# Patient Record
Sex: FEMALE | Race: OTHER | HISPANIC OR LATINO | URBAN - METROPOLITAN AREA
[De-identification: names, ages, dates, MRNs, and addresses within clinical notes are randomized per-mention and may not be internally consistent; named-entity substitution may affect disease eponyms.]

---

## 2017-12-19 ENCOUNTER — EMERGENCY (EMERGENCY)
Facility: HOSPITAL | Age: 62
LOS: 0 days | Discharge: HOME | End: 2017-12-19

## 2017-12-19 DIAGNOSIS — N39.0 URINARY TRACT INFECTION, SITE NOT SPECIFIED: ICD-10-CM

## 2017-12-19 DIAGNOSIS — I10 ESSENTIAL (PRIMARY) HYPERTENSION: ICD-10-CM

## 2017-12-19 DIAGNOSIS — E11.9 TYPE 2 DIABETES MELLITUS WITHOUT COMPLICATIONS: ICD-10-CM

## 2017-12-19 DIAGNOSIS — R11.2 NAUSEA WITH VOMITING, UNSPECIFIED: ICD-10-CM

## 2017-12-19 DIAGNOSIS — R31.9 HEMATURIA, UNSPECIFIED: ICD-10-CM

## 2017-12-19 DIAGNOSIS — Z88.1 ALLERGY STATUS TO OTHER ANTIBIOTIC AGENTS STATUS: ICD-10-CM

## 2017-12-19 DIAGNOSIS — R53.1 WEAKNESS: ICD-10-CM

## 2017-12-29 PROBLEM — Z00.00 ENCOUNTER FOR PREVENTIVE HEALTH EXAMINATION: Status: ACTIVE | Noted: 2017-12-29

## 2018-01-10 ENCOUNTER — OUTPATIENT (OUTPATIENT)
Dept: OUTPATIENT SERVICES | Facility: HOSPITAL | Age: 63
LOS: 1 days | Discharge: HOME | End: 2018-01-10

## 2018-01-10 ENCOUNTER — APPOINTMENT (OUTPATIENT)
Dept: INTERNAL MEDICINE | Facility: CLINIC | Age: 63
End: 2018-01-10

## 2018-01-10 VITALS
DIASTOLIC BLOOD PRESSURE: 86 MMHG | WEIGHT: 141 LBS | BODY MASS INDEX: 25.95 KG/M2 | HEART RATE: 76 BPM | HEIGHT: 62 IN | SYSTOLIC BLOOD PRESSURE: 127 MMHG

## 2018-01-10 DIAGNOSIS — C80.1 MALIGNANT (PRIMARY) NEOPLASM, UNSPECIFIED: ICD-10-CM

## 2018-01-10 DIAGNOSIS — E11.9 TYPE 2 DIABETES MELLITUS WITHOUT COMPLICATIONS: ICD-10-CM

## 2018-01-10 DIAGNOSIS — I10 ESSENTIAL (PRIMARY) HYPERTENSION: ICD-10-CM

## 2018-01-12 ENCOUNTER — RESULT REVIEW (OUTPATIENT)
Age: 63
End: 2018-01-12

## 2018-01-16 LAB
ALBUMIN SERPL-MCNC: 4.2 G/DL
ALBUMIN/GLOB SERPL: 1.75
ALP SERPL-CCNC: 83 IU/L
ALT SERPL-CCNC: 25 IU/L
ANION GAP SERPL CALC-SCNC: 7 MEQ/L
AST SERPL-CCNC: 26 IU/L
BASOPHILS # BLD: 0.02 TH/MM3
BASOPHILS NFR BLD: 0.3 %
BILIRUB SERPL-MCNC: 0.5 MG/DL
BUN SERPL-MCNC: 16 MG/DL
BUN/CREAT SERPL: 19 %
CALCIUM SERPL-MCNC: 9.5 MG/DL
CHLORIDE SERPL-SCNC: 111 MEQ/L
CHOLEST SERPL-MCNC: 106 MG/DL
CO2 SERPL-SCNC: 23 MEQ/L
CREAT SERPL-MCNC: 0.84 MG/DL
DIFFERENTIAL METHOD BLD: NORMAL
EOSINOPHIL # BLD: 0.21 TH/MM3
EOSINOPHIL NFR BLD: 2.9 %
ERYTHROCYTE [DISTWIDTH] IN BLOOD BY AUTOMATED COUNT: 13.5 %
ESTIMATED AVERGAGE GLUCOSE (NORTH): 134 MG/DL
GFR SERPL CREATININE-BSD FRML MDRD: 69
GLUCOSE SERPL-MCNC: 102 MG/DL
GRANULOCYTES # BLD: 3.72 TH/MM3
GRANULOCYTES NFR BLD: 51.9 %
HBA1C MFR BLD: 6.3 %
HCT VFR BLD AUTO: 41.6 %
HDLC SERPL-MCNC: 43 MG/DL
HDLC SERPL: 2.47
HGB BLD-MCNC: 13.3 G/DL
IMM GRANULOCYTES # BLD: 0.02 TH/MM3
IMM GRANULOCYTES NFR BLD: 0.3 %
LDLC SERPL DIRECT ASSAY-MCNC: 55 MG/DL
LYMPHOCYTES # BLD: 2.69 TH/MM3
LYMPHOCYTES NFR BLD: 37.5 %
MCH RBC QN AUTO: 28.4 PG
MCHC RBC AUTO-ENTMCNC: 32 G/DL
MCV RBC AUTO: 88.9 FL
MONOCYTES # BLD: 0.51 TH/MM3
MONOCYTES NFR BLD: 7.1 %
PLATELET # BLD: 207 TH/MM3
PMV BLD AUTO: 12.6 FL
POTASSIUM SERPL-SCNC: 4 MMOL/L
PROT SERPL-MCNC: 6.6 G/DL
RBC # BLD AUTO: 4.68 MIL/MM3
SODIUM SERPL-SCNC: 141 MEQ/L
TRIGL SERPL-MCNC: 68 MG/DL
TSH SERPL DL<=0.005 MIU/L-ACNC: 1.13 UIU/ML
VLDLC SERPL-MCNC: 13 MG/DL
WBC # BLD: 7.17 TH/MM3

## 2018-02-22 ENCOUNTER — OUTPATIENT (OUTPATIENT)
Dept: OUTPATIENT SERVICES | Facility: HOSPITAL | Age: 63
LOS: 1 days | Discharge: HOME | End: 2018-02-22

## 2018-02-23 DIAGNOSIS — F31.81 BIPOLAR II DISORDER: ICD-10-CM

## 2018-03-14 ENCOUNTER — OUTPATIENT (OUTPATIENT)
Dept: OUTPATIENT SERVICES | Facility: HOSPITAL | Age: 63
LOS: 1 days | Discharge: HOME | End: 2018-03-14

## 2018-03-14 ENCOUNTER — APPOINTMENT (OUTPATIENT)
Dept: OBGYN | Facility: CLINIC | Age: 63
End: 2018-03-14

## 2018-03-14 DIAGNOSIS — F31.81 BIPOLAR II DISORDER: ICD-10-CM

## 2018-03-23 ENCOUNTER — OUTPATIENT (OUTPATIENT)
Dept: OUTPATIENT SERVICES | Facility: HOSPITAL | Age: 63
LOS: 1 days | Discharge: HOME | End: 2018-03-23

## 2018-03-23 ENCOUNTER — APPOINTMENT (OUTPATIENT)
Dept: INTERNAL MEDICINE | Facility: CLINIC | Age: 63
End: 2018-03-23

## 2018-03-23 VITALS
WEIGHT: 137 LBS | HEIGHT: 62 IN | DIASTOLIC BLOOD PRESSURE: 81 MMHG | BODY MASS INDEX: 25.21 KG/M2 | HEART RATE: 76 BPM | SYSTOLIC BLOOD PRESSURE: 122 MMHG

## 2018-03-23 DIAGNOSIS — M54.2 CERVICALGIA: ICD-10-CM

## 2018-03-23 DIAGNOSIS — C50.912 MALIGNANT NEOPLASM OF UNSPECIFIED SITE OF LEFT FEMALE BREAST: ICD-10-CM

## 2018-03-23 RX ORDER — HYDROCORTISONE 1 %
12 CREAM (GRAM) TOPICAL
Qty: 225 | Refills: 0 | Status: DISCONTINUED | COMMUNITY
Start: 2017-05-03 | End: 2018-03-23

## 2018-03-23 RX ORDER — OMEPRAZOLE 20 MG/1
20 TABLET, DELAYED RELEASE ORAL
Qty: 30 | Refills: 0 | Status: COMPLETED | COMMUNITY
Start: 2017-03-29

## 2018-03-23 RX ORDER — METFORMIN ER 500 MG 500 MG/1
500 TABLET ORAL
Qty: 30 | Refills: 0 | Status: COMPLETED | COMMUNITY
Start: 2017-11-30

## 2018-03-23 RX ORDER — LIDOCAINE 5% 700 MG/1
5 PATCH TOPICAL
Refills: 0 | Status: DISCONTINUED | COMMUNITY
End: 2018-03-23

## 2018-03-23 RX ORDER — CIPROFLOXACIN HYDROCHLORIDE 500 MG/1
500 TABLET, FILM COATED ORAL
Qty: 20 | Refills: 0 | Status: COMPLETED | COMMUNITY
Start: 2017-12-19

## 2018-03-23 RX ORDER — ASPIRIN 81 MG
81 TABLET, DELAYED RELEASE (ENTERIC COATED) ORAL
Refills: 0 | Status: COMPLETED | COMMUNITY
End: 2018-03-23

## 2018-03-23 RX ORDER — ANASTROZOLE TABLETS 1 MG/1
1 TABLET ORAL
Refills: 0 | Status: COMPLETED | COMMUNITY
End: 2018-03-23

## 2018-03-26 DIAGNOSIS — E11.9 TYPE 2 DIABETES MELLITUS WITHOUT COMPLICATIONS: ICD-10-CM

## 2018-03-26 DIAGNOSIS — D05.12 INTRADUCTAL CARCINOMA IN SITU OF LEFT BREAST: ICD-10-CM

## 2018-03-26 DIAGNOSIS — E78.5 HYPERLIPIDEMIA, UNSPECIFIED: ICD-10-CM

## 2018-04-02 ENCOUNTER — OUTPATIENT (OUTPATIENT)
Dept: OUTPATIENT SERVICES | Facility: HOSPITAL | Age: 63
LOS: 1 days | Discharge: HOME | End: 2018-04-02

## 2018-04-02 DIAGNOSIS — Z12.31 ENCOUNTER FOR SCREENING MAMMOGRAM FOR MALIGNANT NEOPLASM OF BREAST: ICD-10-CM

## 2018-04-09 ENCOUNTER — APPOINTMENT (OUTPATIENT)
Dept: HEMATOLOGY ONCOLOGY | Facility: CLINIC | Age: 63
End: 2018-04-09

## 2018-04-09 ENCOUNTER — OUTPATIENT (OUTPATIENT)
Dept: OUTPATIENT SERVICES | Facility: HOSPITAL | Age: 63
LOS: 1 days | Discharge: HOME | End: 2018-04-09

## 2018-04-09 VITALS
TEMPERATURE: 96.1 F | WEIGHT: 142 LBS | HEART RATE: 69 BPM | SYSTOLIC BLOOD PRESSURE: 153 MMHG | BODY MASS INDEX: 26.13 KG/M2 | HEIGHT: 62 IN | DIASTOLIC BLOOD PRESSURE: 92 MMHG

## 2018-04-09 DIAGNOSIS — F17.200 NICOTINE DEPENDENCE, UNSPECIFIED, UNCOMPLICATED: ICD-10-CM

## 2018-04-09 DIAGNOSIS — Z82.49 FAMILY HISTORY OF ISCHEMIC HEART DISEASE AND OTHER DISEASES OF THE CIRCULATORY SYSTEM: ICD-10-CM

## 2018-04-09 DIAGNOSIS — Z80.0 FAMILY HISTORY OF MALIGNANT NEOPLASM OF DIGESTIVE ORGANS: ICD-10-CM

## 2018-04-09 DIAGNOSIS — L51.1 STEVENS-JOHNSON SYNDROME: ICD-10-CM

## 2018-04-10 PROBLEM — Z82.49 FAMILY HISTORY OF ACUTE MYOCARDIAL INFARCTION: Status: ACTIVE | Noted: 2018-01-10

## 2018-04-10 PROBLEM — L51.1 STEVENS-JOHNSON DISEASE: Status: RESOLVED | Noted: 2018-01-10 | Resolved: 2018-04-10

## 2018-04-10 PROBLEM — F17.200 CURRENT SOME DAY SMOKER: Status: ACTIVE | Noted: 2018-01-10

## 2018-04-10 PROBLEM — Z80.0 FAMILY HISTORY OF MALIGNANT NEOPLASM OF DIGESTIVE ORGAN: Status: ACTIVE | Noted: 2018-01-10

## 2018-04-13 ENCOUNTER — OUTPATIENT (OUTPATIENT)
Dept: OUTPATIENT SERVICES | Facility: HOSPITAL | Age: 63
LOS: 1 days | Discharge: HOME | End: 2018-04-13

## 2018-04-13 DIAGNOSIS — F31.81 BIPOLAR II DISORDER: ICD-10-CM

## 2018-04-16 DIAGNOSIS — D05.10 INTRADUCTAL CARCINOMA IN SITU OF UNSPECIFIED BREAST: ICD-10-CM

## 2018-05-03 ENCOUNTER — OUTPATIENT (OUTPATIENT)
Dept: OUTPATIENT SERVICES | Facility: HOSPITAL | Age: 63
LOS: 1 days | Discharge: HOME | End: 2018-05-03

## 2018-05-03 DIAGNOSIS — F31.81 BIPOLAR II DISORDER: ICD-10-CM

## 2018-06-01 ENCOUNTER — APPOINTMENT (OUTPATIENT)
Dept: INTERNAL MEDICINE | Facility: CLINIC | Age: 63
End: 2018-06-01

## 2018-06-15 ENCOUNTER — OUTPATIENT (OUTPATIENT)
Dept: OUTPATIENT SERVICES | Facility: HOSPITAL | Age: 63
LOS: 1 days | Discharge: HOME | End: 2018-06-15

## 2018-06-15 ENCOUNTER — APPOINTMENT (OUTPATIENT)
Dept: GASTROENTEROLOGY | Facility: CLINIC | Age: 63
End: 2018-06-15

## 2018-06-15 VITALS
SYSTOLIC BLOOD PRESSURE: 123 MMHG | DIASTOLIC BLOOD PRESSURE: 84 MMHG | HEART RATE: 61 BPM | WEIGHT: 138 LBS | BODY MASS INDEX: 25.24 KG/M2

## 2018-06-18 DIAGNOSIS — R13.10 DYSPHAGIA, UNSPECIFIED: ICD-10-CM

## 2018-06-18 DIAGNOSIS — R11.10 VOMITING, UNSPECIFIED: ICD-10-CM

## 2018-06-18 DIAGNOSIS — Z12.11 ENCOUNTER FOR SCREENING FOR MALIGNANT NEOPLASM OF COLON: ICD-10-CM

## 2018-06-19 ENCOUNTER — OUTPATIENT (OUTPATIENT)
Dept: OUTPATIENT SERVICES | Facility: HOSPITAL | Age: 63
LOS: 1 days | Discharge: HOME | End: 2018-06-19

## 2018-06-19 DIAGNOSIS — F41.1 GENERALIZED ANXIETY DISORDER: ICD-10-CM

## 2018-06-22 ENCOUNTER — APPOINTMENT (OUTPATIENT)
Dept: INTERNAL MEDICINE | Facility: CLINIC | Age: 63
End: 2018-06-22

## 2018-06-22 ENCOUNTER — OUTPATIENT (OUTPATIENT)
Dept: OUTPATIENT SERVICES | Facility: HOSPITAL | Age: 63
LOS: 1 days | Discharge: HOME | End: 2018-06-22

## 2018-06-22 VITALS
BODY MASS INDEX: 25.95 KG/M2 | DIASTOLIC BLOOD PRESSURE: 85 MMHG | WEIGHT: 141 LBS | HEART RATE: 69 BPM | TEMPERATURE: 98.3 F | HEIGHT: 62 IN | SYSTOLIC BLOOD PRESSURE: 122 MMHG

## 2018-06-22 DIAGNOSIS — I10 ESSENTIAL (PRIMARY) HYPERTENSION: ICD-10-CM

## 2018-06-22 RX ORDER — METFORMIN HYDROCHLORIDE 500 MG/1
500 TABLET, COATED ORAL TWICE DAILY
Qty: 60 | Refills: 3 | Status: ACTIVE | COMMUNITY

## 2018-06-22 RX ORDER — ATORVASTATIN CALCIUM 40 MG/1
40 TABLET, FILM COATED ORAL
Qty: 30 | Refills: 3 | Status: ACTIVE | COMMUNITY

## 2018-06-22 RX ORDER — LEVOTHYROXINE SODIUM 0.1 MG/1
100 TABLET ORAL DAILY
Qty: 30 | Refills: 3 | Status: ACTIVE | COMMUNITY

## 2018-06-22 RX ORDER — CYCLOBENZAPRINE HYDROCHLORIDE 10 MG/1
10 TABLET, FILM COATED ORAL TWICE DAILY
Qty: 60 | Refills: 3 | Status: ACTIVE | COMMUNITY

## 2018-06-22 NOTE — PHYSICAL EXAM
[No Acute Distress] : no acute distress [Well Nourished] : well nourished [Well Developed] : well developed [Well-Appearing] : well-appearing [Normal Sclera/Conjunctiva] : normal sclera/conjunctiva [PERRL] : pupils equal round and reactive to light [EOMI] : extraocular movements intact [No Respiratory Distress] : no respiratory distress  [Clear to Auscultation] : lungs were clear to auscultation bilaterally [No Accessory Muscle Use] : no accessory muscle use [Normal Rate] : normal rate  [Regular Rhythm] : with a regular rhythm [Soft] : abdomen soft [Non Tender] : non-tender [Non-distended] : non-distended

## 2018-06-22 NOTE — ASSESSMENT
[FreeTextEntry1] : 62 y female PMH as listed presented for f/u.\par \par #Hx of DCIS\par - care established w/ Dr. Gibbons\par - repeat mammo 04/2018 BIRADS 2, will continue w/ annual screening\par \par #HTN, controlled\par - c/w lisinopril\par \par #DM2\par -A1C=6.3 01/2018, will repeat\par -c/w metformin 500 BID\par \par #Hypothyroidism\par -TSH=1.13 01/2018\par -c/w levothyroxine 100\par \par #Dysphagia to solids, r/o post radiation stricture\par - to solids not liquids, r/o stricture post radiation advised to eat mashed and puree\par - EGD and colonoscopy scheduled for September\par \par #Chronic neck and low back pain\par -c-spine and l/s-spine XR showed no acute disease, chronic changes\par -c/w cyclobenzaprine\par -f/u w/ pain management for lidoderm patch\par \par #Depression\par -followed by psychiatrist\par \par #Overactive bladder\par -c/w tolterodine \par \par #HCM\par -mammo 04/2018 BIRADS 2\par -PAP smear 2 years ago in [facility], negative per pt\par -colonscopy scheduled 09/2018\par -RCT 3 months and prn.

## 2018-06-22 NOTE — HISTORY OF PRESENT ILLNESS
[de-identified] : 61 yo F w/ hx of HTN, DM, hypothyroidism, hx of DCIS, dysphagia, chronic back pain presents for routine follow up. Patient has followed up with oncologist and gastroenterologist since last time here. She has no complaint at this time. Requesting refills.

## 2018-07-17 ENCOUNTER — APPOINTMENT (OUTPATIENT)
Dept: OBGYN | Facility: CLINIC | Age: 63
End: 2018-07-17

## 2018-08-15 ENCOUNTER — OUTPATIENT (OUTPATIENT)
Dept: OUTPATIENT SERVICES | Facility: HOSPITAL | Age: 63
LOS: 1 days | Discharge: HOME | End: 2018-08-15

## 2018-08-15 DIAGNOSIS — F41.1 GENERALIZED ANXIETY DISORDER: ICD-10-CM

## 2018-09-13 ENCOUNTER — EMERGENCY (EMERGENCY)
Facility: HOSPITAL | Age: 63
LOS: 0 days | Discharge: HOME | End: 2018-09-13
Attending: EMERGENCY MEDICINE | Admitting: EMERGENCY MEDICINE

## 2018-09-13 VITALS — HEIGHT: 62 IN | WEIGHT: 141.1 LBS

## 2018-09-13 VITALS
DIASTOLIC BLOOD PRESSURE: 95 MMHG | TEMPERATURE: 97 F | HEART RATE: 85 BPM | SYSTOLIC BLOOD PRESSURE: 150 MMHG | OXYGEN SATURATION: 98 % | RESPIRATION RATE: 18 BRPM

## 2018-09-13 DIAGNOSIS — R11.10 VOMITING, UNSPECIFIED: ICD-10-CM

## 2018-09-13 DIAGNOSIS — I10 ESSENTIAL (PRIMARY) HYPERTENSION: ICD-10-CM

## 2018-09-13 DIAGNOSIS — Z79.2 LONG TERM (CURRENT) USE OF ANTIBIOTICS: ICD-10-CM

## 2018-09-13 DIAGNOSIS — R11.2 NAUSEA WITH VOMITING, UNSPECIFIED: ICD-10-CM

## 2018-09-13 DIAGNOSIS — R19.7 DIARRHEA, UNSPECIFIED: ICD-10-CM

## 2018-09-13 DIAGNOSIS — Z79.899 OTHER LONG TERM (CURRENT) DRUG THERAPY: ICD-10-CM

## 2018-09-13 DIAGNOSIS — E11.9 TYPE 2 DIABETES MELLITUS WITHOUT COMPLICATIONS: ICD-10-CM

## 2018-09-13 DIAGNOSIS — Z88.1 ALLERGY STATUS TO OTHER ANTIBIOTIC AGENTS STATUS: ICD-10-CM

## 2018-09-13 DIAGNOSIS — E03.9 HYPOTHYROIDISM, UNSPECIFIED: ICD-10-CM

## 2018-09-13 LAB
ALBUMIN SERPL ELPH-MCNC: 4.9 G/DL — SIGNIFICANT CHANGE UP (ref 3.5–5.2)
ALP SERPL-CCNC: 83 U/L — SIGNIFICANT CHANGE UP (ref 30–115)
ALT FLD-CCNC: 21 U/L — SIGNIFICANT CHANGE UP (ref 0–41)
ANION GAP SERPL CALC-SCNC: 22 MMOL/L — HIGH (ref 7–14)
APPEARANCE UR: ABNORMAL
AST SERPL-CCNC: 32 U/L — SIGNIFICANT CHANGE UP (ref 0–41)
BASE EXCESS BLDV CALC-SCNC: -1 MMOL/L — SIGNIFICANT CHANGE UP (ref -2–2)
BILIRUB SERPL-MCNC: 0.7 MG/DL — SIGNIFICANT CHANGE UP (ref 0.2–1.2)
BILIRUB UR-MCNC: ABNORMAL
BUN SERPL-MCNC: 21 MG/DL — HIGH (ref 10–20)
CA-I SERPL-SCNC: 1.24 MMOL/L — SIGNIFICANT CHANGE UP (ref 1.12–1.3)
CALCIUM SERPL-MCNC: 9.6 MG/DL — SIGNIFICANT CHANGE UP (ref 8.5–10.1)
CHLORIDE SERPL-SCNC: 99 MMOL/L — SIGNIFICANT CHANGE UP (ref 98–110)
CO2 SERPL-SCNC: 18 MMOL/L — SIGNIFICANT CHANGE UP (ref 17–32)
COD CRY URNS QL: ABNORMAL /HPF
COLOR SPEC: SIGNIFICANT CHANGE UP
CREAT SERPL-MCNC: 0.8 MG/DL — SIGNIFICANT CHANGE UP (ref 0.7–1.5)
DIFF PNL FLD: NEGATIVE — SIGNIFICANT CHANGE UP
EPI CELLS # UR: ABNORMAL /HPF
GAS PNL BLDV: 141 MMOL/L — SIGNIFICANT CHANGE UP (ref 136–145)
GAS PNL BLDV: SIGNIFICANT CHANGE UP
GLUCOSE SERPL-MCNC: 109 MG/DL — HIGH (ref 70–99)
GLUCOSE UR QL: NEGATIVE — SIGNIFICANT CHANGE UP
HCO3 BLDV-SCNC: 25 MMOL/L — SIGNIFICANT CHANGE UP (ref 22–29)
HCT VFR BLD CALC: 41.3 % — SIGNIFICANT CHANGE UP (ref 37–47)
HCT VFR BLDA CALC: 44.8 % — HIGH (ref 34–44)
HGB BLD CALC-MCNC: 14.6 G/DL — SIGNIFICANT CHANGE UP (ref 14–18)
HGB BLD-MCNC: 13.7 G/DL — SIGNIFICANT CHANGE UP (ref 12–16)
KETONES UR-MCNC: ABNORMAL
LACTATE BLDV-MCNC: 1.7 MMOL/L — HIGH (ref 0.5–1.6)
LEUKOCYTE ESTERASE UR-ACNC: NEGATIVE — SIGNIFICANT CHANGE UP
LIDOCAIN IGE QN: 20 U/L — SIGNIFICANT CHANGE UP (ref 7–60)
MCHC RBC-ENTMCNC: 27.9 PG — SIGNIFICANT CHANGE UP (ref 27–31)
MCHC RBC-ENTMCNC: 33.2 G/DL — SIGNIFICANT CHANGE UP (ref 32–37)
MCV RBC AUTO: 84.1 FL — SIGNIFICANT CHANGE UP (ref 81–99)
NITRITE UR-MCNC: NEGATIVE — SIGNIFICANT CHANGE UP
NRBC # BLD: 0 /100 WBCS — SIGNIFICANT CHANGE UP (ref 0–0)
PCO2 BLDV: 44 MMHG — SIGNIFICANT CHANGE UP (ref 41–51)
PH BLDV: 7.36 — SIGNIFICANT CHANGE UP (ref 7.26–7.43)
PH UR: 5.5 — SIGNIFICANT CHANGE UP (ref 5–8)
PLATELET # BLD AUTO: 263 K/UL — SIGNIFICANT CHANGE UP (ref 130–400)
PO2 BLDV: 34 MMHG — SIGNIFICANT CHANGE UP (ref 20–40)
POTASSIUM BLDV-SCNC: 4 MMOL/L — SIGNIFICANT CHANGE UP (ref 3.3–5.6)
POTASSIUM SERPL-MCNC: 5.3 MMOL/L — HIGH (ref 3.5–5)
POTASSIUM SERPL-SCNC: 5.3 MMOL/L — HIGH (ref 3.5–5)
PROT SERPL-MCNC: 8.1 G/DL — HIGH (ref 6–8)
PROT UR-MCNC: 30
RBC # BLD: 4.91 M/UL — SIGNIFICANT CHANGE UP (ref 4.2–5.4)
RBC # FLD: 13 % — SIGNIFICANT CHANGE UP (ref 11.5–14.5)
SAO2 % BLDV: 61 % — SIGNIFICANT CHANGE UP
SODIUM SERPL-SCNC: 139 MMOL/L — SIGNIFICANT CHANGE UP (ref 135–146)
SP GR SPEC: >=1.03 — SIGNIFICANT CHANGE UP (ref 1.01–1.03)
UROBILINOGEN FLD QL: 0.2 — SIGNIFICANT CHANGE UP (ref 0.2–0.2)
WBC # BLD: 11.56 K/UL — HIGH (ref 4.8–10.8)
WBC # FLD AUTO: 11.56 K/UL — HIGH (ref 4.8–10.8)

## 2018-09-13 RX ORDER — TOLTERODINE TARTRATE 1 MG/1
0 TABLET, FILM COATED ORAL
Qty: 0 | Refills: 0 | COMMUNITY

## 2018-09-13 RX ORDER — SOD,AMMONIUM,POTASSIUM LACTATE
0 CREAM (GRAM) TOPICAL
Qty: 0 | Refills: 0 | COMMUNITY

## 2018-09-13 RX ORDER — CALCIUM CARBONATE 500(1250)
2 TABLET ORAL
Qty: 0 | Refills: 0 | COMMUNITY

## 2018-09-13 RX ORDER — ONDANSETRON 8 MG/1
1 TABLET, FILM COATED ORAL
Qty: 9 | Refills: 0
Start: 2018-09-13 | End: 2018-09-15

## 2018-09-13 RX ORDER — SODIUM CHLORIDE 9 MG/ML
1000 INJECTION INTRAMUSCULAR; INTRAVENOUS; SUBCUTANEOUS ONCE
Qty: 0 | Refills: 0 | Status: COMPLETED | OUTPATIENT
Start: 2018-09-13 | End: 2018-09-13

## 2018-09-13 RX ORDER — ONDANSETRON 8 MG/1
4 TABLET, FILM COATED ORAL ONCE
Qty: 0 | Refills: 0 | Status: COMPLETED | OUTPATIENT
Start: 2018-09-13 | End: 2018-09-13

## 2018-09-13 RX ORDER — LIDOCAINE 4 G/100G
0 CREAM TOPICAL
Qty: 0 | Refills: 0 | COMMUNITY

## 2018-09-13 RX ORDER — LEVOTHYROXINE SODIUM 125 MCG
0 TABLET ORAL
Qty: 0 | Refills: 0 | COMMUNITY

## 2018-09-13 RX ORDER — OMEPRAZOLE 10 MG/1
0 CAPSULE, DELAYED RELEASE ORAL
Qty: 0 | Refills: 0 | COMMUNITY

## 2018-09-13 RX ORDER — LISINOPRIL 2.5 MG/1
0 TABLET ORAL
Qty: 0 | Refills: 0 | COMMUNITY

## 2018-09-13 RX ADMIN — SODIUM CHLORIDE 2000 MILLILITER(S): 9 INJECTION INTRAMUSCULAR; INTRAVENOUS; SUBCUTANEOUS at 08:38

## 2018-09-13 RX ADMIN — ONDANSETRON 4 MILLIGRAM(S): 8 TABLET, FILM COATED ORAL at 08:38

## 2018-09-13 RX ADMIN — SODIUM CHLORIDE 1000 MILLILITER(S): 9 INJECTION INTRAMUSCULAR; INTRAVENOUS; SUBCUTANEOUS at 09:43

## 2018-09-13 NOTE — SBIRT NOTE. - NSSBIRTSERVICES_GEN_A_ED_FT
Provided SBIRT services: Full Screen Negative  Positive reinforcement provided given patient currently within healthy guidelines.   AUDIT Score:   DAST-10 Score  Duration = # Minutes

## 2018-09-13 NOTE — ED ADULT NURSE NOTE - NSIMPLEMENTINTERV_GEN_ALL_ED
Implemented All Universal Safety Interventions:  [place] to call system. Call bell, personal items and telephone within reach. Instruct patient to call for assistance. Room bathroom lighting operational. Non-slip footwear when patient is off stretcher. Physically safe environment: no spills, clutter or unnecessary equipment. Stretcher in lowest position, wheels locked, appropriate side rails in place.

## 2018-09-13 NOTE — ED PROVIDER NOTE - ENMT, MLM
Airway patent, Nasal mucosa clear. Mouth with normal mucosa, moist mucus membranes. Throat has no vesicles, no oropharyngeal exudates and uvula is midline.

## 2018-09-13 NOTE — ED PROVIDER NOTE - MEDICAL DECISION MAKING DETAILS
63 y.o. F, PMH of HTN, hypothyroidism, DM, Breast CA in remission, c/o n/v/d for 5 days. Unable to keep anything down. No fever/chills, CP/SOB, abdominal pain, back pain or urinary symptoms. (+) sick contacts- grandson with same symptoms at home, getting better. On exam, pt in NAD, AAOx3, head NC/AT, CN II-XII intact, MM dry, lungs CTA B/L, CV S1S2 regular, abdomen soft/NT/ND/(+)BS, ext (-) edema. Labs reviewed. Pt is tolerating PO. WIll discharge. Advised to return for worsening of symptoms.

## 2018-09-13 NOTE — ED PROVIDER NOTE - CONSTITUTIONAL, MLM
normal... Awake, alert, oriented to person, place, time/situation, in discomfort secondary to vomiting at triage

## 2018-09-13 NOTE — ED PROVIDER NOTE - ATTENDING CONTRIBUTION TO CARE
63 y.o. F, PMH of HTN, hypothyroidism, DM, Breast CA in remission, c/o n/v/d for 5 days. Unable to keep anything down. No fever/chills, CP/SOB, abdominal pain, back pain or urinary symptoms. (+) sick contacts- grandson with same symptoms at home, getting better. On exam, pt in NAD, AAOx3, head NC/AT, CN II-XII intact, MM dry, lungs CTA B/L, CV S1S2 regular, abdomen soft/NT/ND/(+)BS, ext (-) edema. Will do labs, IVF, Zofran, PO trial and reevaluate.

## 2018-09-13 NOTE — ED ADULT NURSE NOTE - OBJECTIVE STATEMENT
Patient c/o N/V/D and can not tolerate PO intake x 5 days. Also stated she has sick family at home with similar symptoms  Patient denies Fevers Chills CP SOB and Blood in Stool or Emesis

## 2018-09-13 NOTE — ED PROVIDER NOTE - OBJECTIVE STATEMENT
62 yo F with hx of Breast CA no longer on chemo, DM II, HTN, Hypothyroidism, presents for evaluation of diarrhea, onset 5 days ago, associated with vomiting x 3 days with decreased PO intake. NO fever, no chills, no headache, no chest pain, no back pain, no abdominal pain, no new foods, no lightheadedness. Patient states that her grandson had similar symptoms but only lasted 3 days. She states that she has been trying to eat but has been unable. Patient denies any other symptoms.

## 2018-09-13 NOTE — ED PROVIDER NOTE - PROGRESS NOTE DETAILS
Patient feeling better, will PO challenge patient and reassess Pt is tolerating PO, feels better, will discharge.

## 2018-10-01 ENCOUNTER — OUTPATIENT (OUTPATIENT)
Dept: OUTPATIENT SERVICES | Facility: HOSPITAL | Age: 63
LOS: 1 days | Discharge: HOME | End: 2018-10-01

## 2018-10-01 DIAGNOSIS — F41.1 GENERALIZED ANXIETY DISORDER: ICD-10-CM

## 2018-10-01 PROBLEM — E03.9 HYPOTHYROIDISM, UNSPECIFIED: Chronic | Status: ACTIVE | Noted: 2018-09-13

## 2018-10-01 PROBLEM — C50.919 MALIGNANT NEOPLASM OF UNSPECIFIED SITE OF UNSPECIFIED FEMALE BREAST: Chronic | Status: ACTIVE | Noted: 2018-09-13

## 2018-10-01 PROBLEM — E11.9 TYPE 2 DIABETES MELLITUS WITHOUT COMPLICATIONS: Chronic | Status: ACTIVE | Noted: 2018-09-13

## 2018-10-01 PROBLEM — N32.81 OVERACTIVE BLADDER: Chronic | Status: ACTIVE | Noted: 2018-09-13

## 2018-10-01 PROBLEM — I10 ESSENTIAL (PRIMARY) HYPERTENSION: Chronic | Status: ACTIVE | Noted: 2018-09-13

## 2018-12-12 ENCOUNTER — OUTPATIENT (OUTPATIENT)
Dept: OUTPATIENT SERVICES | Facility: HOSPITAL | Age: 63
LOS: 1 days | Discharge: HOME | End: 2018-12-12

## 2018-12-12 DIAGNOSIS — F31.81 BIPOLAR II DISORDER: ICD-10-CM

## 2018-12-26 ENCOUNTER — APPOINTMENT (OUTPATIENT)
Dept: INTERNAL MEDICINE | Facility: CLINIC | Age: 63
End: 2018-12-26

## 2019-03-13 ENCOUNTER — OUTPATIENT (OUTPATIENT)
Dept: OUTPATIENT SERVICES | Facility: HOSPITAL | Age: 64
LOS: 1 days | Discharge: HOME | End: 2019-03-13

## 2019-03-13 DIAGNOSIS — F31.81 BIPOLAR II DISORDER: ICD-10-CM

## 2019-04-08 ENCOUNTER — APPOINTMENT (OUTPATIENT)
Dept: HEMATOLOGY ONCOLOGY | Facility: CLINIC | Age: 64
End: 2019-04-08

## 2019-07-25 ENCOUNTER — OUTPATIENT (OUTPATIENT)
Dept: OUTPATIENT SERVICES | Facility: HOSPITAL | Age: 64
LOS: 1 days | Discharge: HOME | End: 2019-07-25
Payer: MEDICAID

## 2019-07-25 DIAGNOSIS — F31.89 OTHER BIPOLAR DISORDER: ICD-10-CM

## 2019-07-25 PROCEDURE — 99214 OFFICE O/P EST MOD 30 MIN: CPT

## 2019-10-11 ENCOUNTER — OUTPATIENT (OUTPATIENT)
Dept: OUTPATIENT SERVICES | Facility: HOSPITAL | Age: 64
LOS: 1 days | Discharge: HOME | End: 2019-10-11
Payer: MEDICAID

## 2019-10-11 DIAGNOSIS — F31.89 OTHER BIPOLAR DISORDER: ICD-10-CM

## 2019-10-11 PROCEDURE — 99214 OFFICE O/P EST MOD 30 MIN: CPT

## 2019-12-12 ENCOUNTER — OUTPATIENT (OUTPATIENT)
Dept: OUTPATIENT SERVICES | Facility: HOSPITAL | Age: 64
LOS: 1 days | Discharge: HOME | End: 2019-12-12
Payer: MEDICAID

## 2019-12-12 DIAGNOSIS — F31.89 OTHER BIPOLAR DISORDER: ICD-10-CM

## 2019-12-12 PROCEDURE — 99213 OFFICE O/P EST LOW 20 MIN: CPT

## 2020-02-28 ENCOUNTER — OUTPATIENT (OUTPATIENT)
Dept: OUTPATIENT SERVICES | Facility: HOSPITAL | Age: 65
LOS: 1 days | Discharge: HOME | End: 2020-02-28
Payer: MEDICAID

## 2020-02-28 DIAGNOSIS — F29 UNSPECIFIED PSYCHOSIS NOT DUE TO A SUBSTANCE OR KNOWN PHYSIOLOGICAL CONDITION: ICD-10-CM

## 2020-02-28 PROCEDURE — 99213 OFFICE O/P EST LOW 20 MIN: CPT

## 2020-03-03 ENCOUNTER — OUTPATIENT (OUTPATIENT)
Dept: OUTPATIENT SERVICES | Facility: HOSPITAL | Age: 65
LOS: 1 days | Discharge: HOME | End: 2020-03-03
Payer: MEDICAID

## 2020-03-03 DIAGNOSIS — R00.0 TACHYCARDIA, UNSPECIFIED: ICD-10-CM

## 2020-03-03 PROCEDURE — 93010 ELECTROCARDIOGRAM REPORT: CPT

## 2020-03-26 ENCOUNTER — OUTPATIENT (OUTPATIENT)
Dept: OUTPATIENT SERVICES | Facility: HOSPITAL | Age: 65
LOS: 1 days | Discharge: HOME | End: 2020-03-26
Payer: MEDICAID

## 2020-03-26 DIAGNOSIS — F33.1 MAJOR DEPRESSIVE DISORDER, RECURRENT, MODERATE: ICD-10-CM

## 2020-03-26 DIAGNOSIS — F41.1 GENERALIZED ANXIETY DISORDER: ICD-10-CM

## 2020-03-26 PROCEDURE — 99442: CPT

## 2020-04-23 ENCOUNTER — OUTPATIENT (OUTPATIENT)
Dept: OUTPATIENT SERVICES | Facility: HOSPITAL | Age: 65
LOS: 1 days | Discharge: HOME | End: 2020-04-23
Payer: MEDICAID

## 2020-04-23 DIAGNOSIS — F41.1 GENERALIZED ANXIETY DISORDER: ICD-10-CM

## 2020-04-23 DIAGNOSIS — F33.1 MAJOR DEPRESSIVE DISORDER, RECURRENT, MODERATE: ICD-10-CM

## 2020-04-23 PROCEDURE — 99442: CPT

## 2020-05-14 ENCOUNTER — OUTPATIENT (OUTPATIENT)
Dept: OUTPATIENT SERVICES | Facility: HOSPITAL | Age: 65
LOS: 1 days | Discharge: HOME | End: 2020-05-14
Payer: MEDICAID

## 2020-05-14 DIAGNOSIS — F33.1 MAJOR DEPRESSIVE DISORDER, RECURRENT, MODERATE: ICD-10-CM

## 2020-05-14 DIAGNOSIS — F41.1 GENERALIZED ANXIETY DISORDER: ICD-10-CM

## 2020-05-14 PROCEDURE — 99442: CPT

## 2020-06-17 ENCOUNTER — OUTPATIENT (OUTPATIENT)
Dept: OUTPATIENT SERVICES | Facility: HOSPITAL | Age: 65
LOS: 1 days | Discharge: HOME | End: 2020-06-17
Payer: MEDICAID

## 2020-06-17 DIAGNOSIS — F41.1 GENERALIZED ANXIETY DISORDER: ICD-10-CM

## 2020-06-17 DIAGNOSIS — F33.41 MAJOR DEPRESSIVE DISORDER, RECURRENT, IN PARTIAL REMISSION: ICD-10-CM

## 2020-06-17 PROCEDURE — 99214 OFFICE O/P EST MOD 30 MIN: CPT | Mod: 95

## 2020-07-16 ENCOUNTER — OUTPATIENT (OUTPATIENT)
Dept: OUTPATIENT SERVICES | Facility: HOSPITAL | Age: 65
LOS: 1 days | Discharge: HOME | End: 2020-07-16
Payer: MEDICAID

## 2020-07-16 DIAGNOSIS — F33.41 MAJOR DEPRESSIVE DISORDER, RECURRENT, IN PARTIAL REMISSION: ICD-10-CM

## 2020-07-16 DIAGNOSIS — F41.1 GENERALIZED ANXIETY DISORDER: ICD-10-CM

## 2020-07-16 PROCEDURE — ZZZZZ: CPT

## 2020-07-16 NOTE — ED PROVIDER NOTE - NS ED NOTE AC HIGH RISK COUNTRIES
Topical Clindamycin Pregnancy And Lactation Text: This medication is Pregnancy Category B and is considered safe during pregnancy. It is unknown if it is excreted in breast milk. Isotretinoin Pregnancy And Lactation Text: This medication is Pregnancy Category X and is considered extremely dangerous during pregnancy. It is unknown if it is excreted in breast milk. Azithromycin Pregnancy And Lactation Text: This medication is considered safe during pregnancy and is also secreted in breast milk. Benzoyl Peroxide Counseling: Patient counseled that medicine may cause skin irritation and bleach clothing.  In the event of skin irritation, the patient was advised to reduce the amount of the drug applied or use it less frequently.   The patient verbalized understanding of the proper use and possible adverse effects of benzoyl peroxide.  All of the patient's questions and concerns were addressed. Tazorac Pregnancy And Lactation Text: This medication is not safe during pregnancy. It is unknown if this medication is excreted in breast milk. Minocycline Counseling: Patient advised regarding possible photosensitivity and discoloration of the teeth, skin, lips, tongue and gums.  Patient instructed to avoid sunlight, if possible.  When exposed to sunlight, patients should wear protective clothing, sunglasses, and sunscreen.  The patient was instructed to call the office immediately if the following severe adverse effects occur:  hearing changes, easy bruising/bleeding, severe headache, or vision changes.  The patient verbalized understanding of the proper use and possible adverse effects of minocycline.  All of the patient's questions and concerns were addressed. Doxycycline Pregnancy And Lactation Text: This medication is Pregnancy Category D and not consider safe during pregnancy. It is also excreted in breast milk but is considered safe for shorter treatment courses. Topical Sulfur Applications Counseling: Topical Sulfur Counseling: Patient counseled that this medication may cause skin irritation or allergic reactions.  In the event of skin irritation, the patient was advised to reduce the amount of the drug applied or use it less frequently.   The patient verbalized understanding of the proper use and possible adverse effects of topical sulfur application.  All of the patient's questions and concerns were addressed. Birth Control Pills Counseling: Birth Control Pill Counseling: I discussed with the patient the potential side effects of OCPs including but not limited to increased risk of stroke, heart attack, thrombophlebitis, deep venous thrombosis, hepatic adenomas, breast changes, GI upset, headaches, and depression.  The patient verbalized understanding of the proper use and possible adverse effects of OCPs. All of the patient's questions and concerns were addressed. Erythromycin Pregnancy And Lactation Text: This medication is Pregnancy Category B and is considered safe during pregnancy. It is also excreted in breast milk. Detail Level: Generalized Bactrim Pregnancy And Lactation Text: This medication is Pregnancy Category D and is known to cause fetal risk.  It is also excreted in breast milk. No Azithromycin Counseling:  I discussed with the patient the risks of azithromycin including but not limited to GI upset, allergic reaction, drug rash, diarrhea, and yeast infections. Erythromycin Counseling:  I discussed with the patient the risks of erythromycin including but not limited to GI upset, allergic reaction, drug rash, diarrhea, increase in liver enzymes, and yeast infections. Doxycycline Counseling:  Patient counseled regarding possible photosensitivity and increased risk for sunburn.  Patient instructed to avoid sunlight, if possible.  When exposed to sunlight, patients should wear protective clothing, sunglasses, and sunscreen.  The patient was instructed to call the office immediately if the following severe adverse effects occur:  hearing changes, easy bruising/bleeding, severe headache, or vision changes.  The patient verbalized understanding of the proper use and possible adverse effects of doxycycline.  All of the patient's questions and concerns were addressed. Topical Retinoid Pregnancy And Lactation Text: This medication is Pregnancy Category C. It is unknown if this medication is excreted in breast milk. Include Pregnancy/Lactation Warning?: No Tazorac Counseling:  Patient advised that medication is irritating and drying.  Patient may need to apply sparingly and wash off after an hour before eventually leaving it on overnight.  The patient verbalized understanding of the proper use and possible adverse effects of tazorac.  All of the patient's questions and concerns were addressed. Topical Sulfur Applications Pregnancy And Lactation Text: This medication is Pregnancy Category C and has an unknown safety profile during pregnancy. It is unknown if this topical medication is excreted in breast milk. Benzoyl Peroxide Pregnancy And Lactation Text: This medication is Pregnancy Category C. It is unknown if benzoyl peroxide is excreted in breast milk. Topical Clindamycin Counseling: Patient counseled that this medication may cause skin irritation or allergic reactions.  In the event of skin irritation, the patient was advised to reduce the amount of the drug applied or use it less frequently.   The patient verbalized understanding of the proper use and possible adverse effects of clindamycin.  All of the patient's questions and concerns were addressed. Birth Control Pills Pregnancy And Lactation Text: This medication should be avoided if pregnant and for the first 30 days post-partum. Spironolactone Pregnancy And Lactation Text: This medication can cause feminization of the male fetus and should be avoided during pregnancy. The active metabolite is also found in breast milk. Spironolactone Counseling: Patient advised regarding risks of diarrhea, abdominal pain, hyperkalemia, birth defects (for female patients), liver toxicity and renal toxicity. The patient may need blood work to monitor liver and kidney function and potassium levels while on therapy. The patient verbalized understanding of the proper use and possible adverse effects of spironolactone.  All of the patient's questions and concerns were addressed. Tetracycline Pregnancy And Lactation Text: This medication is Pregnancy Category D and not consider safe during pregnancy. It is also excreted in breast milk. High Dose Vitamin A Pregnancy And Lactation Text: High dose vitamin A therapy is contraindicated during pregnancy and breast feeding. Isotretinoin Counseling: Patient should get monthly blood tests, not donate blood, not drive at night if vision affected, not share medication, and not undergo elective surgery for 6 months after tx completed. Side effects reviewed, pt to contact office should one occur. Topical Retinoid counseling:  Patient advised to apply a pea-sized amount only at bedtime and wait 30 minutes after washing their face before applying.  If too drying, patient may add a non-comedogenic moisturizer. The patient verbalized understanding of the proper use and possible adverse effects of retinoids.  All of the patient's questions and concerns were addressed. High Dose Vitamin A Counseling: Side effects reviewed, pt to contact office should one occur. Detail Level: Zone Tetracycline Counseling: Patient counseled regarding possible photosensitivity and increased risk for sunburn.  Patient instructed to avoid sunlight, if possible.  When exposed to sunlight, patients should wear protective clothing, sunglasses, and sunscreen.  The patient was instructed to call the office immediately if the following severe adverse effects occur:  hearing changes, easy bruising/bleeding, severe headache, or vision changes.  The patient verbalized understanding of the proper use and possible adverse effects of tetracycline.  All of the patient's questions and concerns were addressed. Patient understands to avoid pregnancy while on therapy due to potential birth defects. Sarecycline Counseling: Patient advised regarding possible photosensitivity and discoloration of the teeth, skin, lips, tongue and gums.  Patient instructed to avoid sunlight, if possible.  When exposed to sunlight, patients should wear protective clothing, sunglasses, and sunscreen.  The patient was instructed to call the office immediately if the following severe adverse effects occur:  hearing changes, easy bruising/bleeding, severe headache, or vision changes.  The patient verbalized understanding of the proper use and possible adverse effects of sarecycline.  All of the patient's questions and concerns were addressed. Dapsone Counseling: I discussed with the patient the risks of dapsone including but not limited to hemolytic anemia, agranulocytosis, rashes, methemoglobinemia, kidney failure, peripheral neuropathy, headaches, GI upset, and liver toxicity.  Patients who start dapsone require monitoring including baseline LFTs and weekly CBCs for the first month, then every month thereafter.  The patient verbalized understanding of the proper use and possible adverse effects of dapsone.  All of the patient's questions and concerns were addressed. Bactrim Counseling:  I discussed with the patient the risks of sulfa antibiotics including but not limited to GI upset, allergic reaction, drug rash, diarrhea, dizziness, photosensitivity, and yeast infections.  Rarely, more serious reactions can occur including but not limited to aplastic anemia, agranulocytosis, methemoglobinemia, blood dyscrasias, liver or kidney failure, lung infiltrates or desquamative/blistering drug rashes. Dapsone Pregnancy And Lactation Text: This medication is Pregnancy Category C and is not considered safe during pregnancy or breast feeding.

## 2020-08-14 ENCOUNTER — OUTPATIENT (OUTPATIENT)
Dept: OUTPATIENT SERVICES | Facility: HOSPITAL | Age: 65
LOS: 1 days | Discharge: HOME | End: 2020-08-14
Payer: MEDICAID

## 2020-08-14 DIAGNOSIS — F33.41 MAJOR DEPRESSIVE DISORDER, RECURRENT, IN PARTIAL REMISSION: ICD-10-CM

## 2020-08-14 DIAGNOSIS — F41.1 GENERALIZED ANXIETY DISORDER: ICD-10-CM

## 2020-08-14 PROCEDURE — ZZZZZ: CPT

## 2020-09-17 ENCOUNTER — OUTPATIENT (OUTPATIENT)
Dept: OUTPATIENT SERVICES | Facility: HOSPITAL | Age: 65
LOS: 1 days | Discharge: HOME | End: 2020-09-17
Payer: MEDICAID

## 2020-09-17 PROCEDURE — 99212 OFFICE O/P EST SF 10 MIN: CPT | Mod: 95

## 2020-10-16 ENCOUNTER — OUTPATIENT (OUTPATIENT)
Dept: OUTPATIENT SERVICES | Facility: HOSPITAL | Age: 65
LOS: 1 days | Discharge: HOME | End: 2020-10-16
Payer: MEDICAID

## 2020-10-16 DIAGNOSIS — F33.41 MAJOR DEPRESSIVE DISORDER, RECURRENT, IN PARTIAL REMISSION: ICD-10-CM

## 2020-10-16 DIAGNOSIS — F41.1 GENERALIZED ANXIETY DISORDER: ICD-10-CM

## 2020-10-16 PROCEDURE — ZZZZZ: CPT

## 2020-10-29 ENCOUNTER — APPOINTMENT (OUTPATIENT)
Dept: OTOLARYNGOLOGY | Facility: CLINIC | Age: 65
End: 2020-10-29
Payer: MEDICAID

## 2020-10-29 VITALS — WEIGHT: 143 LBS | HEIGHT: 62 IN | BODY MASS INDEX: 26.31 KG/M2

## 2020-10-29 DIAGNOSIS — K21.9 GASTRO-ESOPHAGEAL REFLUX DISEASE W/OUT ESOPHAGITIS: ICD-10-CM

## 2020-10-29 DIAGNOSIS — M26.609 UNSPECIFIED TEMPOROMANDIBULAR JOINT DISORDER: ICD-10-CM

## 2020-10-29 PROCEDURE — 99072 ADDL SUPL MATRL&STAF TM PHE: CPT

## 2020-10-29 PROCEDURE — 99204 OFFICE O/P NEW MOD 45 MIN: CPT | Mod: 25

## 2020-10-29 PROCEDURE — 31575 DIAGNOSTIC LARYNGOSCOPY: CPT

## 2020-10-29 NOTE — PROCEDURE
[Dysphagia] : dysphagia not clearly evaluated by indirect laryngoscopy [None] : none [Flexible Endoscope] : examined with the flexible endoscope [Normal] : posterior cricoid area had healthy pink mucosa in the interarytenoid area and the esophageal inlet

## 2020-10-29 NOTE — HISTORY OF PRESENT ILLNESS
[de-identified] : Pt presents today with bilateral otalgia . Pt admits these symptoms she has experienced for the last 2 month and has gotten worse. No meds were taken. Pt denies hearing loss. Pt 1 ear infection a long time ago, nothing in the last couple of yrs \par Pt states she feel that the food gets stuck in her throat she begins to cough then she vomits . Pt admit this has been going on for 2 months. No weight loss. \par Pt does experienced Dysphagia. No testing was done  She has h/o reflux and takes meds prn.

## 2020-10-30 ENCOUNTER — OUTPATIENT (OUTPATIENT)
Dept: OUTPATIENT SERVICES | Facility: HOSPITAL | Age: 65
LOS: 1 days | Discharge: HOME | End: 2020-10-30

## 2020-10-30 ENCOUNTER — APPOINTMENT (OUTPATIENT)
Dept: UROGYNECOLOGY | Facility: CLINIC | Age: 65
End: 2020-10-30
Payer: MEDICAID

## 2020-10-30 VITALS — HEIGHT: 62 IN | DIASTOLIC BLOOD PRESSURE: 70 MMHG | SYSTOLIC BLOOD PRESSURE: 108 MMHG

## 2020-10-30 DIAGNOSIS — Z87.19 PERSONAL HISTORY OF OTHER DISEASES OF THE DIGESTIVE SYSTEM: ICD-10-CM

## 2020-10-30 DIAGNOSIS — H92.02 OTALGIA, LEFT EAR: ICD-10-CM

## 2020-10-30 DIAGNOSIS — Z78.9 OTHER SPECIFIED HEALTH STATUS: ICD-10-CM

## 2020-10-30 DIAGNOSIS — Z86.000 PERSONAL HISTORY OF IN-SITU NEOPLASM OF BREAST: ICD-10-CM

## 2020-10-30 DIAGNOSIS — K59.00 CONSTIPATION, UNSPECIFIED: ICD-10-CM

## 2020-10-30 DIAGNOSIS — Z87.39 PERSONAL HISTORY OF OTHER DISEASES OF THE MUSCULOSKELETAL SYSTEM AND CONNECTIVE TISSUE: ICD-10-CM

## 2020-10-30 DIAGNOSIS — L51.1 STEVENS-JOHNSON SYNDROME: ICD-10-CM

## 2020-10-30 PROCEDURE — 99205 OFFICE O/P NEW HI 60 MIN: CPT | Mod: 25

## 2020-10-30 PROCEDURE — 51701 INSERT BLADDER CATHETER: CPT

## 2020-10-30 RX ORDER — LISINOPRIL 20 MG/1
20 TABLET ORAL DAILY
Qty: 30 | Refills: 3 | Status: COMPLETED | COMMUNITY
End: 2020-10-30

## 2020-10-30 RX ORDER — IBUPROFEN 800 MG/1
800 TABLET, FILM COATED ORAL TWICE DAILY
Qty: 48 | Refills: 3 | Status: DISCONTINUED | COMMUNITY
Start: 2020-10-29 | End: 2020-10-30

## 2020-10-30 RX ORDER — OXYCODONE AND ACETAMINOPHEN 10; 325 MG/1; MG/1
10-325 TABLET ORAL
Refills: 0 | Status: DISCONTINUED | COMMUNITY
End: 2020-10-30

## 2020-10-30 RX ORDER — POLYETHYLENE GLYCOL 3350, SODIUM SULFATE ANHYDROUS, SODIUM BICARBONATE, SODIUM CHLORIDE, POTASSIUM CHLORIDE 227.1; 21.5; 6.36; 5.53; .754 G/L; G/L; G/L; G/L; G/L
227.1 POWDER, FOR SOLUTION ORAL
Qty: 1 | Refills: 0 | Status: DISCONTINUED | COMMUNITY
Start: 2018-06-15 | End: 2020-10-30

## 2020-10-30 RX ORDER — HYDROXYZINE PAMOATE 25 MG/1
25 CAPSULE ORAL
Refills: 0 | Status: DISCONTINUED | COMMUNITY
End: 2020-10-30

## 2020-10-30 RX ORDER — DOCUSATE SODIUM 100 MG
100 CAPSULE ORAL
Refills: 0 | Status: DISCONTINUED | COMMUNITY
End: 2020-10-30

## 2020-10-30 RX ORDER — TOLTERODINE TARTRATE 4 MG/1
4 CAPSULE, EXTENDED RELEASE ORAL
Qty: 30 | Refills: 3 | Status: DISCONTINUED | COMMUNITY
Start: 2018-06-22 | End: 2020-10-30

## 2020-10-30 RX ORDER — SENNOSIDES 8.6 MG/1
8.6 CAPSULE, GELATIN COATED ORAL
Qty: 90 | Refills: 3 | Status: DISCONTINUED | COMMUNITY
Start: 2018-06-15 | End: 2020-10-30

## 2020-10-31 LAB
APPEARANCE: CLEAR
BILIRUBIN URINE: NEGATIVE
BLOOD URINE: NEGATIVE
COLOR: YELLOW
GLUCOSE QUALITATIVE U: NEGATIVE
KETONES URINE: NEGATIVE
LEUKOCYTE ESTERASE URINE: NEGATIVE
NITRITE URINE: NEGATIVE
PH URINE: 6
PROTEIN URINE: NEGATIVE
SPECIFIC GRAVITY URINE: 1.02
UROBILINOGEN URINE: NORMAL

## 2020-10-31 NOTE — PHYSICAL EXAM
[Chaperone Present] : A chaperone was present in the examining room during all aspects of the physical examination [FreeTextEntry1] : Void: 50 cc\par PVR:  10cc\par Urethra was prepped in sterile fashion and then a sterile catheter was used by me to drain the bladder.\par \par Well healed incision: RUQ, Pfannenstiel\par normal perineal sensation\par normal perineal reflexes\par negative cough stress test\par positive atrophy\par negative prolapse\par positive urethral hypermobility\par bilateral levator ani spasm,  tenderness more on the right than the left\par mild urethral tenderness\par no bladder tenderness\par mild cuff tenderness\par surgically absent uterus and cervix\par Possible mesh (? sling) palpated. No mesh exposure.\par good sphincter tone\par Good rectal squeeze\par 2/5 Kegel\par

## 2020-10-31 NOTE — HISTORY OF PRESENT ILLNESS
[FreeTextEntry1] : \par Pt with pelvic floor dysfunction here for urogynecologic evaluation. She describes: \par PCP: Coleen Mas NP\par \par Chief PFD: frequency, urgency and nocturia\par \par Pelvic organ prolapse: s/p delma, s/p DEXTER with some lift for incontinence, [facility], years ago, no bulge, denies splinting\par Stress urinary incontinence: with strong cough, KELLY: U>S\par Overactive bladder syndrome: Increased frequency and loss of control of bladder, s/p Detrol 4mg with no change. Increased urgency even at night, voids 9 voids per night, urge incontinence daily, for years, worsening, no glaucoma, hx of controlled DM.\par Voiding dysfunction: negative for incomplete bladder emptying, no hesitancy \par Lower urinary tract/vaginal symptoms: 1 UTIs per year, no hematuria, no dysuria, no bladder pain \par Fecal incontinence: once  a month\par Defecatory dysfunction: sausage\par Sexual dysfunction: not active\par Pelvic pain: on gabapentin, denies pain\par Vaginal dryness: hx of breast cancer, no dryness\par \par Her pelvic floor symptoms are significantly bothersome and negatively impacting her quality of life. \par \par

## 2020-10-31 NOTE — COUNSELING
[FreeTextEntry1] : \par  staff: please have the patient sign a medical release form to obtain operative report from [facility] (hysterectomy around 15 years ago)\par \par We will notify you of the urine results if they are abnormal\par \par Please increase your water intake to 4-5 bottles of water per day\par \par For 4-5 days, cut one item from the list out of your diet.\par \par After 4-5 days, it it makes a difference, you have to decide if it is worth keeping it out of your diet to help with the urinary issues.\par \par If it does not make a difference, you should add it back into your diet and remove another item for another 4-5 days.\par \par Bowel recipe every morning for constipation control\par \par Please start the trospium (bladder medication) twice a day for the incontinence\par \par Please call my office if you have any issues with the cost or side effects of the medication.\par \par Schedule 6 week med check with my PARaissa ([facility] [place], [place])\par \par I sent rxns for bed liners and pads. Call if there is an issue with the pharmacy

## 2020-10-31 NOTE — DISCUSSION/SUMMARY
[FreeTextEntry1] : \par Mixed incontinence-\par The pathophysiology of the above condition was discussed with the patient. The patient was given information and education on pelvic floor muscle exercises/rehabilitation, avoidance of dietary bladder irritants, and other strategies to improve bladder control, such as scheduled voiding. She was counseled regarding further management strategies for overactive bladder and urge incontinence including pelvic floor physical therapy, medications or surgical management. The patient voiced understanding and agrees with diet changes, constipation control and medical management. The risks and benefits of trospium was reviewed. We will follow up on her urine tests.\par \par Constipation-\par The patient was counseled about her defecatory dysfunction. We discussed the importance of fiber, hydration and exercise. She was given a handout with specific information about dietary fiber and ways to relieve constipation.\par \par

## 2020-11-01 LAB — BACTERIA UR CULT: ABNORMAL

## 2020-11-06 ENCOUNTER — NON-APPOINTMENT (OUTPATIENT)
Age: 65
End: 2020-11-06

## 2020-11-06 ENCOUNTER — OUTPATIENT (OUTPATIENT)
Dept: OUTPATIENT SERVICES | Facility: HOSPITAL | Age: 65
LOS: 1 days | Discharge: HOME | End: 2020-11-06
Payer: MEDICAID

## 2020-11-06 DIAGNOSIS — K21.9 GASTRO-ESOPHAGEAL REFLUX DISEASE WITHOUT ESOPHAGITIS: ICD-10-CM

## 2020-11-06 PROCEDURE — 74220 X-RAY XM ESOPHAGUS 1CNTRST: CPT | Mod: 26

## 2020-11-23 ENCOUNTER — APPOINTMENT (OUTPATIENT)
Dept: OTOLARYNGOLOGY | Facility: CLINIC | Age: 65
End: 2020-11-23
Payer: MEDICAID

## 2020-11-23 DIAGNOSIS — J39.2 OTHER DISEASES OF PHARYNX: ICD-10-CM

## 2020-11-23 PROCEDURE — 99213 OFFICE O/P EST LOW 20 MIN: CPT | Mod: 25

## 2020-11-23 PROCEDURE — 31575 DIAGNOSTIC LARYNGOSCOPY: CPT

## 2020-11-23 NOTE — ASSESSMENT
[FreeTextEntry1] : Pt will stay on the PPI. 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

## 2020-11-23 NOTE — PROCEDURE
[Normal] : the false vocal folds were pink and regular, the ventricular sulcus was open, the true vocal folds were glistening white, tense and of equal length, mobility, and height [True Vocal Cords Erythematous] : bilateral true vocal cord edema [Interarytenoid Edema] : interarytenoid area edematous

## 2020-11-23 NOTE — HISTORY OF PRESENT ILLNESS
[FreeTextEntry1] : Patient presents today following up on dysphagia. Patient denies any improvement with swallowing. Patient had Xray esophagram. Patient is complaint with medications. 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

## 2020-11-27 ENCOUNTER — APPOINTMENT (OUTPATIENT)
Dept: PSYCHIATRY | Facility: CLINIC | Age: 65
End: 2020-11-27

## 2020-12-08 ENCOUNTER — APPOINTMENT (OUTPATIENT)
Dept: UROGYNECOLOGY | Facility: CLINIC | Age: 65
End: 2020-12-08
Payer: MEDICAID

## 2020-12-08 ENCOUNTER — OUTPATIENT (OUTPATIENT)
Dept: OUTPATIENT SERVICES | Facility: HOSPITAL | Age: 65
LOS: 1 days | Discharge: HOME | End: 2020-12-08

## 2020-12-08 VITALS — HEIGHT: 62 IN | DIASTOLIC BLOOD PRESSURE: 70 MMHG | SYSTOLIC BLOOD PRESSURE: 106 MMHG

## 2020-12-08 PROCEDURE — 99213 OFFICE O/P EST LOW 20 MIN: CPT | Mod: 25

## 2020-12-08 PROCEDURE — 51702 INSERT TEMP BLADDER CATH: CPT

## 2020-12-08 RX ORDER — TROSPIUM CHLORIDE 20 MG/1
20 TABLET, FILM COATED ORAL
Qty: 60 | Refills: 1 | Status: DISCONTINUED | COMMUNITY
Start: 2020-10-30 | End: 2020-12-08

## 2020-12-08 NOTE — COUNSELING
[FreeTextEntry1] : If you feel like you have an infection it is important for you to call our office and we will arrange testing of your urine.\par \par Please take antibiotic, Macrobid 100mg twice a day for 7 days for urine infection.\par We will contact you if the urine results are abnormal.\par \par Please STOP taking Trospium 20mg.\par \par Please begin taking Vesicare 5mg once a day. It takes up to 6 weeks to go into full effect. Please  your refill when you complete the 1st bottle.\par \par Please call my office if you have any issues with the cost or side effects of the medication. \par \par Schedule a 6 weeks follow up med check appointment.\par

## 2020-12-08 NOTE — DISCUSSION/SUMMARY
[FreeTextEntry1] : Mixed Incontinence-\par Rx: Vesicare 5mg QD\par Precautions reviewed.\par Will return in 6 weeks for follow up or earlier if she has any issues.\par \par Dysuria\par Urine culture obtained.\par Will follow up.\par Rx: Macrobid 100mg BID x 7 days\par

## 2020-12-08 NOTE — HISTORY OF PRESENT ILLNESS
[FreeTextEntry1] : Patient is here for 6 weeks med check for urge incontinence, frequency, nocturia.\par Last seen on 10/30/20 as a new pt.  \par \par s/p DEXTER with some lift for incontinence at [facility] many years ago, s/p delma\par H/o controlled DM\par H/o Breast cancer\par On Gabapentin, no pain\par \par Today, patient states that Trospium did not help her at all. Pt is also c/o dysuria x 4 days and feels that she has a urine infection. Denies fever or hematuria. \par \par Patient would like to try a new bladder medication.\par

## 2020-12-08 NOTE — PHYSICAL EXAM
[Chaperone Present] : A chaperone was present in the examining room during all aspects of the physical examination [FreeTextEntry1] : Urethra was prepped in sterile fashion and then a sterile catheter was used by me to drain the bladder.\par cath: 100cc [No Acute Distress] : in no acute distress [Well developed] : well developed [Well Nourished] : ~L well nourished

## 2020-12-13 LAB — BACTERIA UR CULT: ABNORMAL

## 2020-12-15 ENCOUNTER — NON-APPOINTMENT (OUTPATIENT)
Age: 65
End: 2020-12-15

## 2020-12-16 ENCOUNTER — OUTPATIENT (OUTPATIENT)
Dept: OUTPATIENT SERVICES | Facility: HOSPITAL | Age: 65
LOS: 1 days | Discharge: HOME | End: 2020-12-16
Payer: MEDICAID

## 2020-12-16 ENCOUNTER — APPOINTMENT (OUTPATIENT)
Dept: PSYCHIATRY | Facility: CLINIC | Age: 65
End: 2020-12-16

## 2020-12-16 DIAGNOSIS — G47.30 SLEEP APNEA, UNSPECIFIED: ICD-10-CM

## 2020-12-16 DIAGNOSIS — F41.1 GENERALIZED ANXIETY DISORDER: ICD-10-CM

## 2020-12-16 DIAGNOSIS — F33.41 MAJOR DEPRESSIVE DISORDER, RECURRENT, IN PARTIAL REMISSION: ICD-10-CM

## 2020-12-16 DIAGNOSIS — G43.009 MIGRAINE W/OUT AURA, NOT INTRACTABLE, W/OUT STATUS MIGRAINOSUS: ICD-10-CM

## 2020-12-16 PROCEDURE — 99212 OFFICE O/P EST SF 10 MIN: CPT | Mod: 95

## 2021-01-20 ENCOUNTER — OUTPATIENT (OUTPATIENT)
Dept: OUTPATIENT SERVICES | Facility: HOSPITAL | Age: 66
LOS: 1 days | Discharge: HOME | End: 2021-01-20

## 2021-01-20 ENCOUNTER — APPOINTMENT (OUTPATIENT)
Dept: UROGYNECOLOGY | Facility: CLINIC | Age: 66
End: 2021-01-20
Payer: MEDICAID

## 2021-01-20 VITALS
SYSTOLIC BLOOD PRESSURE: 130 MMHG | DIASTOLIC BLOOD PRESSURE: 86 MMHG | BODY MASS INDEX: 26.31 KG/M2 | HEIGHT: 62 IN | WEIGHT: 143 LBS

## 2021-01-20 PROCEDURE — 99213 OFFICE O/P EST LOW 20 MIN: CPT

## 2021-01-20 NOTE — DISCUSSION/SUMMARY
[FreeTextEntry1] : Mixed Incontinence-\par Rx Vesicare 10mg QD (3R)\par Refills provided. 30 days supply with 3 refills.\par Precautions reviewed.\par Will return in 3 months for follow up or earlier if she has any issues.\par \par

## 2021-01-20 NOTE — HISTORY OF PRESENT ILLNESS
[FreeTextEntry1] : Pt is here for 6 weeks med check for urge incontinence.\par Last seen on 12/8/20 for med check.\par \par s/p DEXTER with some lift for incontinence at [facility] many years ago, s/p delma\par H/o controlled DM\par H/o Breast cancer\par s/p Trospium 20mg BID: did not help\par Vesicare 5mg\par \par Today pt states that she took Vesicare for 1 month only but did not refill it, she didn't get a change to go to the pharmacy. States that feels that it helped her a little. Denies side effects, would like to continue and increase the dose.

## 2021-01-20 NOTE — COUNSELING
[FreeTextEntry1] : If you feel like you have an infection it is important for you to call our office and we will arrange testing of your urine.\par \par Please STOP taking Vesicare 5 mg.\par \par Please begin taking Vesicare 10 mg. It takes up to 6 weeks to go into full effect. Please  your refill when you complete the 1st bottle.\par \par Please call my office if you have any issues with the cost or side effects of the medication. \par \par Schedule a 3 months follow up med check appointment.\par

## 2021-01-22 ENCOUNTER — OUTPATIENT (OUTPATIENT)
Dept: OUTPATIENT SERVICES | Facility: HOSPITAL | Age: 66
LOS: 1 days | Discharge: HOME | End: 2021-01-22

## 2021-01-22 ENCOUNTER — APPOINTMENT (OUTPATIENT)
Dept: PSYCHIATRY | Facility: CLINIC | Age: 66
End: 2021-01-22
Payer: MEDICAID

## 2021-01-22 DIAGNOSIS — F41.1 GENERALIZED ANXIETY DISORDER: ICD-10-CM

## 2021-01-22 DIAGNOSIS — F33.41 MAJOR DEPRESSIVE DISORDER, RECURRENT, IN PARTIAL REMISSION: ICD-10-CM

## 2021-01-22 PROCEDURE — ZZZZZ: CPT

## 2021-01-31 ENCOUNTER — RX RENEWAL (OUTPATIENT)
Age: 66
End: 2021-01-31

## 2021-02-05 ENCOUNTER — NON-APPOINTMENT (OUTPATIENT)
Age: 66
End: 2021-02-05

## 2021-02-12 ENCOUNTER — APPOINTMENT (OUTPATIENT)
Dept: PSYCHIATRY | Facility: CLINIC | Age: 66
End: 2021-02-12

## 2021-02-24 ENCOUNTER — APPOINTMENT (OUTPATIENT)
Dept: OTOLARYNGOLOGY | Facility: CLINIC | Age: 66
End: 2021-02-24

## 2021-04-15 ENCOUNTER — APPOINTMENT (OUTPATIENT)
Dept: PSYCHIATRY | Facility: CLINIC | Age: 66
End: 2021-04-15
Payer: MEDICAID

## 2021-04-15 ENCOUNTER — OUTPATIENT (OUTPATIENT)
Dept: OUTPATIENT SERVICES | Facility: HOSPITAL | Age: 66
LOS: 1 days | Discharge: HOME | End: 2021-04-15

## 2021-04-15 DIAGNOSIS — F41.1 GENERALIZED ANXIETY DISORDER: ICD-10-CM

## 2021-04-15 DIAGNOSIS — F33.41 MAJOR DEPRESSIVE DISORDER, RECURRENT, IN PARTIAL REMISSION: ICD-10-CM

## 2021-04-15 PROCEDURE — ZZZZZ: CPT

## 2021-04-21 ENCOUNTER — APPOINTMENT (OUTPATIENT)
Dept: UROGYNECOLOGY | Facility: CLINIC | Age: 66
End: 2021-04-21

## 2021-05-11 ENCOUNTER — APPOINTMENT (OUTPATIENT)
Dept: PSYCHIATRY | Facility: CLINIC | Age: 66
End: 2021-05-11

## 2021-05-13 RX ORDER — ZOLPIDEM TARTRATE 5 MG/1
5 TABLET ORAL
Qty: 30 | Refills: 0 | Status: DISCONTINUED | COMMUNITY
Start: 2020-10-21 | End: 2021-05-13

## 2021-06-03 ENCOUNTER — OUTPATIENT (OUTPATIENT)
Dept: OUTPATIENT SERVICES | Facility: HOSPITAL | Age: 66
LOS: 1 days | Discharge: HOME | End: 2021-06-03

## 2021-06-03 ENCOUNTER — APPOINTMENT (OUTPATIENT)
Dept: PSYCHIATRY | Facility: CLINIC | Age: 66
End: 2021-06-03
Payer: COMMERCIAL

## 2021-06-03 DIAGNOSIS — F33.41 MAJOR DEPRESSIVE DISORDER, RECURRENT, IN PARTIAL REMISSION: ICD-10-CM

## 2021-06-03 DIAGNOSIS — F41.8 MAJOR DEPRESSIVE DISORDER, RECURRENT, IN PARTIAL REMISSION: ICD-10-CM

## 2021-06-03 DIAGNOSIS — F41.1 GENERALIZED ANXIETY DISORDER: ICD-10-CM

## 2021-06-03 PROCEDURE — 99442: CPT

## 2021-06-08 ENCOUNTER — APPOINTMENT (OUTPATIENT)
Dept: PSYCHIATRY | Facility: CLINIC | Age: 66
End: 2021-06-08

## 2021-07-02 ENCOUNTER — APPOINTMENT (OUTPATIENT)
Dept: PSYCHIATRY | Facility: CLINIC | Age: 66
End: 2021-07-02

## 2021-07-14 ENCOUNTER — EMERGENCY (EMERGENCY)
Facility: HOSPITAL | Age: 66
LOS: 0 days | Discharge: HOME | End: 2021-07-14
Attending: EMERGENCY MEDICINE | Admitting: EMERGENCY MEDICINE
Payer: MEDICAID

## 2021-07-14 VITALS
TEMPERATURE: 98 F | DIASTOLIC BLOOD PRESSURE: 79 MMHG | WEIGHT: 145.06 LBS | SYSTOLIC BLOOD PRESSURE: 173 MMHG | OXYGEN SATURATION: 97 % | HEIGHT: 62 IN | HEART RATE: 82 BPM | RESPIRATION RATE: 18 BRPM

## 2021-07-14 DIAGNOSIS — M79.671 PAIN IN RIGHT FOOT: ICD-10-CM

## 2021-07-14 DIAGNOSIS — M79.89 OTHER SPECIFIED SOFT TISSUE DISORDERS: ICD-10-CM

## 2021-07-14 DIAGNOSIS — M79.661 PAIN IN RIGHT LOWER LEG: ICD-10-CM

## 2021-07-14 DIAGNOSIS — M25.561 PAIN IN RIGHT KNEE: ICD-10-CM

## 2021-07-14 DIAGNOSIS — Z87.891 PERSONAL HISTORY OF NICOTINE DEPENDENCE: ICD-10-CM

## 2021-07-14 DIAGNOSIS — M25.562 PAIN IN LEFT KNEE: ICD-10-CM

## 2021-07-14 DIAGNOSIS — Z88.1 ALLERGY STATUS TO OTHER ANTIBIOTIC AGENTS STATUS: ICD-10-CM

## 2021-07-14 DIAGNOSIS — M25.461 EFFUSION, RIGHT KNEE: ICD-10-CM

## 2021-07-14 DIAGNOSIS — Z79.890 HORMONE REPLACEMENT THERAPY: ICD-10-CM

## 2021-07-14 DIAGNOSIS — Z79.899 OTHER LONG TERM (CURRENT) DRUG THERAPY: ICD-10-CM

## 2021-07-14 DIAGNOSIS — R26.2 DIFFICULTY IN WALKING, NOT ELSEWHERE CLASSIFIED: ICD-10-CM

## 2021-07-14 PROCEDURE — 73562 X-RAY EXAM OF KNEE 3: CPT | Mod: 26,50

## 2021-07-14 PROCEDURE — 93970 EXTREMITY STUDY: CPT | Mod: 26

## 2021-07-14 PROCEDURE — 99285 EMERGENCY DEPT VISIT HI MDM: CPT

## 2021-07-14 RX ORDER — LIDOCAINE 4 G/100G
1 CREAM TOPICAL ONCE
Refills: 0 | Status: COMPLETED | OUTPATIENT
Start: 2021-07-14 | End: 2021-07-14

## 2021-07-14 RX ORDER — KETOROLAC TROMETHAMINE 30 MG/ML
30 SYRINGE (ML) INJECTION ONCE
Refills: 0 | Status: DISCONTINUED | OUTPATIENT
Start: 2021-07-14 | End: 2021-07-14

## 2021-07-14 RX ORDER — OXYCODONE AND ACETAMINOPHEN 5; 325 MG/1; MG/1
1 TABLET ORAL ONCE
Refills: 0 | Status: DISCONTINUED | OUTPATIENT
Start: 2021-07-14 | End: 2021-07-14

## 2021-07-14 RX ORDER — LIDOCAINE 4 G/100G
1 CREAM TOPICAL
Qty: 7 | Refills: 0
Start: 2021-07-14 | End: 2021-07-20

## 2021-07-14 RX ORDER — IBUPROFEN 200 MG
1 TABLET ORAL
Qty: 15 | Refills: 0
Start: 2021-07-14 | End: 2021-07-18

## 2021-07-14 RX ADMIN — LIDOCAINE 1 PATCH: 4 CREAM TOPICAL at 12:25

## 2021-07-14 RX ADMIN — Medication 30 MILLIGRAM(S): at 12:21

## 2021-07-14 RX ADMIN — OXYCODONE AND ACETAMINOPHEN 1 TABLET(S): 5; 325 TABLET ORAL at 12:21

## 2021-07-14 NOTE — ED PROVIDER NOTE - OBJECTIVE STATEMENT
66 yo f with pmh of dm, presents with 2 months of knee pain R>L.  noted R knee swelling, difficulty walking.  also noted R calf pain and the sole of R foot.  pt was using advil at home with little relief.  no trauma.

## 2021-07-14 NOTE — ED PROVIDER NOTE - NSFOLLOWUPINSTRUCTIONS_ED_ALL_ED_FT
Acute Knee Pain, Adult  Knee pain in adults is common. It can be caused by many things, including:  Arthritis.  A fluid-filled sac (cyst) or growth in your knee.  An infection in your knee.  An injury that will not heal.  Damage, swelling, or irritation of the tissues that support your knee.  Knee pain is usually not a sign of a serious problem. The pain may go away on its own with time and rest. If it does not, a health care provider may order tests to find the cause of the pain. These may include:  Imaging tests, such as an X-ray, MRI, or ultrasound.  Joint aspiration. In this test, fluid is removed from the knee.  Arthroscopy. In this test, a lighted tube is inserted into knee and an image is projected onto a TV screen.  A biopsy. In this test, a sample of tissue is removed from the body and studied under a microscope.  Follow these instructions at home:  Pay attention to any changes in your symptoms. Take these actions to relieve your pain.    Activity     Rest your knee.  Do not do things that cause pain or make pain worse.  Avoid high-impact activities or exercises, such as running, jumping rope, or doing jumping jacks.  General instructions     Take over-the-counter and prescription medicines only as told by your health care provider.  Raise (elevate) your knee above the level of your heart when you are sitting or lying down.  Sleep with a pillow under your knee.  If directed, apply ice to the knee:  Put ice in a plastic bag.  Place a towel between your skin and the bag.  Leave the ice on for 20 minutes, 2–3 times a day.  Ask your health care provider if you should wear an elastic knee support.  Lose weight if you are overweight. Extra weight can put pressure on your knee.  Do not use any products that contain nicotine or tobacco, such as cigarettes and e-cigarettes. Smoking may slow the healing of any bone and joint problems that you may have. If you need help quitting, ask your health care provider.  Contact a health care provider if:  Your knee pain continues, changes, or gets worse.  You have a fever along with knee pain.  Your knee trudy or locks up.  Your knee swells, and the swelling becomes worse.  Get help right away if:  Your knee feels warm to the touch.  You cannot move your knee.  You have severe pain in your knee.  You have chest pain.  You have trouble breathing.  Summary  Knee pain in adults is common. It can be caused by many things, including, arthritis, infection, cysts, or injury.  Knee pain is usually not a sign of a serious problem, but if it does not go away, a health care provider may perform tests to know the cause of the pain.  Pay attention to any changes in your symptoms. Relieve your pain with rest, medicines, light activity, and use of ice.  Get help if your pain continues or becomes very severe, or if your knee trudy or locks up, or if you have chest pain or trouble breathing.  This information is not intended to replace advice given to you by your health care provider. Make sure you discuss any questions you have with your health care provider.

## 2021-07-14 NOTE — ED ADULT NURSE NOTE - OBJECTIVE STATEMENT
Ref # for call [id number] Pt presented with c/o b/l knee pain x a couple of months. Pt endorses R knee worse than L. Pt also states she is having R calf and foot pain. No open wounds injuries noted to lower extremities. No redness/swelling noted. No obvious deformities or trauma noted.

## 2021-07-14 NOTE — ED PROVIDER NOTE - CLINICAL SUMMARY MEDICAL DECISION MAKING FREE TEXT BOX
Pt with knee pain R>L, mildly swollen, atraumatic, duplex neg for dvt. will wrap and pt advised, antiinflam, lidocaine patches and ortho f/u

## 2021-07-14 NOTE — ED PROVIDER NOTE - NS ED ROS FT
Review of Systems:  	•	CONSTITUTIONAL - no fever, no diaphoresis, no weight change  	•	SKIN - no rash  	•	HEMATOLOGIC - no bleeding, no bruising  	•	EYES - no eye pain, no blurred vision  	•	ENT - no change in hearing, no pain  	•	RESPIRATORY - no shortness of breath, no cough  	•	CARDIAC - no chest pain, no palpitations  	•	GI - no abd pain, no nausea, no vomiting, no diarrhea, no constipation, no bleeding  	•	 - no dysuria, no hematuria, no flank pain, no urinary retention  	•	MUSCULOSKELETAL - no joint paint, no swelling, no redness, knee pain R>L, R knee swelling, R calf pain, R foot burning  	•	NEUROLOGIC - no weakness, no headache, no paresthesias, no dizziness  All other systems negative, unless specified in HPI

## 2021-07-14 NOTE — ED PROVIDER NOTE - PHYSICAL EXAMINATION
VITAL SIGNS: I have reviewed nursing notes and confirm.  CONSTITUTIONAL: Well-developed; well-nourished; in no acute distress.  SKIN: Skin exam is warm and dry, no acute rash.  HEAD: Normocephalic; atraumatic.  EYES: PERRL, EOM intact; conjunctiva and sclera clear.  ENT: No nasal discharge; airway clear. TMs clear.  NECK: Supple; non tender.  CARD: S1, S2 normal; no murmurs, gallops, or rubs. Regular rate and rhythm.  RESP: No wheezes, rales or rhonchi.  ABD: Normal bowel sounds; soft; non-distended; non-tender;  EXT: No clubbing, cyanosis or edema. R knee superior and lateral ttp, +suprapatellar effusion, flexion and extension mildly limited by pain, R calf tenderness, R foot nontender, L knee normal  NEURO: aox3, cn2-12 grossly normal, 5/5 strength all ext, sensation intact, finger to nose normal, romberg neg, normal gait  PSYCH: Cooperative, appropriate.

## 2021-07-14 NOTE — ED PROVIDER NOTE - CARE PROVIDER_API CALL
Pollo Esparza (MD)  Orthopaedic Surgery  [address] [place] [address]  Phone: [phone]  Fax: [phone]  Follow Up Time: 1-3 Days

## 2021-07-14 NOTE — ED PROVIDER NOTE - PATIENT PORTAL LINK FT
You can access the FollowMyHealth Patient Portal offered by [facility] by registering at the following website: [email or web address]. By joining [employer]’s FollowMyHealth portal, you will also be able to view your health information using other applications (apps) compatible with our system.

## 2021-09-17 ENCOUNTER — EMERGENCY (EMERGENCY)
Facility: HOSPITAL | Age: 66
LOS: 0 days | Discharge: HOME | End: 2021-09-17
Attending: EMERGENCY MEDICINE | Admitting: EMERGENCY MEDICINE
Payer: MEDICAID

## 2021-09-17 VITALS
OXYGEN SATURATION: 98 % | SYSTOLIC BLOOD PRESSURE: 159 MMHG | WEIGHT: 149.03 LBS | RESPIRATION RATE: 18 BRPM | TEMPERATURE: 98 F | HEIGHT: 62 IN | DIASTOLIC BLOOD PRESSURE: 78 MMHG | HEART RATE: 71 BPM

## 2021-09-17 VITALS
OXYGEN SATURATION: 98 % | RESPIRATION RATE: 18 BRPM | HEART RATE: 82 BPM | TEMPERATURE: 98 F | DIASTOLIC BLOOD PRESSURE: 82 MMHG | SYSTOLIC BLOOD PRESSURE: 138 MMHG

## 2021-09-17 DIAGNOSIS — Z79.890 HORMONE REPLACEMENT THERAPY: ICD-10-CM

## 2021-09-17 DIAGNOSIS — R11.0 NAUSEA: ICD-10-CM

## 2021-09-17 DIAGNOSIS — E78.5 HYPERLIPIDEMIA, UNSPECIFIED: ICD-10-CM

## 2021-09-17 DIAGNOSIS — K57.92 DIVERTICULITIS OF INTESTINE, PART UNSPECIFIED, WITHOUT PERFORATION OR ABSCESS WITHOUT BLEEDING: ICD-10-CM

## 2021-09-17 DIAGNOSIS — Z79.899 OTHER LONG TERM (CURRENT) DRUG THERAPY: ICD-10-CM

## 2021-09-17 DIAGNOSIS — I10 ESSENTIAL (PRIMARY) HYPERTENSION: ICD-10-CM

## 2021-09-17 DIAGNOSIS — Z88.1 ALLERGY STATUS TO OTHER ANTIBIOTIC AGENTS STATUS: ICD-10-CM

## 2021-09-17 DIAGNOSIS — Z85.3 PERSONAL HISTORY OF MALIGNANT NEOPLASM OF BREAST: ICD-10-CM

## 2021-09-17 DIAGNOSIS — R10.30 LOWER ABDOMINAL PAIN, UNSPECIFIED: ICD-10-CM

## 2021-09-17 DIAGNOSIS — E03.9 HYPOTHYROIDISM, UNSPECIFIED: ICD-10-CM

## 2021-09-17 DIAGNOSIS — Z20.822 CONTACT WITH AND (SUSPECTED) EXPOSURE TO COVID-19: ICD-10-CM

## 2021-09-17 LAB
ALBUMIN SERPL ELPH-MCNC: 4.4 G/DL — SIGNIFICANT CHANGE UP (ref 3.5–5.2)
ALP SERPL-CCNC: 86 U/L — SIGNIFICANT CHANGE UP (ref 30–115)
ALT FLD-CCNC: 20 U/L — SIGNIFICANT CHANGE UP (ref 0–41)
ANION GAP SERPL CALC-SCNC: 14 MMOL/L — SIGNIFICANT CHANGE UP (ref 7–14)
APPEARANCE UR: CLEAR — SIGNIFICANT CHANGE UP
AST SERPL-CCNC: 29 U/L — SIGNIFICANT CHANGE UP (ref 0–41)
BASOPHILS # BLD AUTO: 0.05 K/UL — SIGNIFICANT CHANGE UP (ref 0–0.2)
BASOPHILS NFR BLD AUTO: 0.6 % — SIGNIFICANT CHANGE UP (ref 0–1)
BILIRUB SERPL-MCNC: 0.7 MG/DL — SIGNIFICANT CHANGE UP (ref 0.2–1.2)
BILIRUB UR-MCNC: NEGATIVE — SIGNIFICANT CHANGE UP
BUN SERPL-MCNC: 11 MG/DL — SIGNIFICANT CHANGE UP (ref 10–20)
CALCIUM SERPL-MCNC: 9.3 MG/DL — SIGNIFICANT CHANGE UP (ref 8.5–10.1)
CHLORIDE SERPL-SCNC: 101 MMOL/L — SIGNIFICANT CHANGE UP (ref 98–110)
CO2 SERPL-SCNC: 22 MMOL/L — SIGNIFICANT CHANGE UP (ref 17–32)
COLOR SPEC: YELLOW — SIGNIFICANT CHANGE UP
CREAT SERPL-MCNC: 0.8 MG/DL — SIGNIFICANT CHANGE UP (ref 0.7–1.5)
DIFF PNL FLD: NEGATIVE — SIGNIFICANT CHANGE UP
EOSINOPHIL # BLD AUTO: 0.19 K/UL — SIGNIFICANT CHANGE UP (ref 0–0.7)
EOSINOPHIL NFR BLD AUTO: 2.4 % — SIGNIFICANT CHANGE UP (ref 0–8)
GLUCOSE SERPL-MCNC: 115 MG/DL — HIGH (ref 70–99)
GLUCOSE UR QL: NEGATIVE — SIGNIFICANT CHANGE UP
HCT VFR BLD CALC: 38.9 % — SIGNIFICANT CHANGE UP (ref 37–47)
HGB BLD-MCNC: 12.4 G/DL — SIGNIFICANT CHANGE UP (ref 12–16)
IMM GRANULOCYTES NFR BLD AUTO: 0.4 % — HIGH (ref 0.1–0.3)
KETONES UR-MCNC: NEGATIVE — SIGNIFICANT CHANGE UP
LACTATE SERPL-SCNC: 1.7 MMOL/L — SIGNIFICANT CHANGE UP (ref 0.7–2)
LEUKOCYTE ESTERASE UR-ACNC: NEGATIVE — SIGNIFICANT CHANGE UP
LIDOCAIN IGE QN: 16 U/L — SIGNIFICANT CHANGE UP (ref 7–60)
LYMPHOCYTES # BLD AUTO: 2.58 K/UL — SIGNIFICANT CHANGE UP (ref 1.2–3.4)
LYMPHOCYTES # BLD AUTO: 32.5 % — SIGNIFICANT CHANGE UP (ref 20.5–51.1)
MCHC RBC-ENTMCNC: 26.2 PG — LOW (ref 27–31)
MCHC RBC-ENTMCNC: 31.9 G/DL — LOW (ref 32–37)
MCV RBC AUTO: 82.1 FL — SIGNIFICANT CHANGE UP (ref 81–99)
MONOCYTES # BLD AUTO: 0.66 K/UL — HIGH (ref 0.1–0.6)
MONOCYTES NFR BLD AUTO: 8.3 % — SIGNIFICANT CHANGE UP (ref 1.7–9.3)
NEUTROPHILS # BLD AUTO: 4.43 K/UL — SIGNIFICANT CHANGE UP (ref 1.4–6.5)
NEUTROPHILS NFR BLD AUTO: 55.8 % — SIGNIFICANT CHANGE UP (ref 42.2–75.2)
NITRITE UR-MCNC: NEGATIVE — SIGNIFICANT CHANGE UP
NRBC # BLD: 0 /100 WBCS — SIGNIFICANT CHANGE UP (ref 0–0)
PH UR: 5.5 — SIGNIFICANT CHANGE UP (ref 5–8)
PLATELET # BLD AUTO: 184 K/UL — SIGNIFICANT CHANGE UP (ref 130–400)
POTASSIUM SERPL-MCNC: 5.3 MMOL/L — HIGH (ref 3.5–5)
POTASSIUM SERPL-SCNC: 5.3 MMOL/L — HIGH (ref 3.5–5)
PROT SERPL-MCNC: 7.4 G/DL — SIGNIFICANT CHANGE UP (ref 6–8)
PROT UR-MCNC: SIGNIFICANT CHANGE UP
RBC # BLD: 4.74 M/UL — SIGNIFICANT CHANGE UP (ref 4.2–5.4)
RBC # FLD: 14.8 % — HIGH (ref 11.5–14.5)
SODIUM SERPL-SCNC: 137 MMOL/L — SIGNIFICANT CHANGE UP (ref 135–146)
SP GR SPEC: 1.02 — SIGNIFICANT CHANGE UP (ref 1.01–1.03)
UROBILINOGEN FLD QL: SIGNIFICANT CHANGE UP
WBC # BLD: 7.94 K/UL — SIGNIFICANT CHANGE UP (ref 4.8–10.8)
WBC # FLD AUTO: 7.94 K/UL — SIGNIFICANT CHANGE UP (ref 4.8–10.8)

## 2021-09-17 PROCEDURE — 71046 X-RAY EXAM CHEST 2 VIEWS: CPT | Mod: 26

## 2021-09-17 PROCEDURE — 99285 EMERGENCY DEPT VISIT HI MDM: CPT

## 2021-09-17 PROCEDURE — 74177 CT ABD & PELVIS W/CONTRAST: CPT | Mod: 26,MA

## 2021-09-17 RX ORDER — MORPHINE SULFATE 50 MG/1
4 CAPSULE, EXTENDED RELEASE ORAL ONCE
Refills: 0 | Status: DISCONTINUED | OUTPATIENT
Start: 2021-09-17 | End: 2021-09-17

## 2021-09-17 RX ORDER — CIPROFLOXACIN LACTATE 400MG/40ML
400 VIAL (ML) INTRAVENOUS ONCE
Refills: 0 | Status: COMPLETED | OUTPATIENT
Start: 2021-09-17 | End: 2021-09-17

## 2021-09-17 RX ORDER — METRONIDAZOLE 500 MG
1 TABLET ORAL
Qty: 40 | Refills: 0
Start: 2021-09-17 | End: 2021-09-26

## 2021-09-17 RX ORDER — IBUPROFEN 200 MG
1 TABLET ORAL
Qty: 20 | Refills: 0
Start: 2021-09-17 | End: 2021-09-21

## 2021-09-17 RX ORDER — CIPROFLOXACIN LACTATE 400MG/40ML
1 VIAL (ML) INTRAVENOUS
Qty: 20 | Refills: 0
Start: 2021-09-17 | End: 2021-09-26

## 2021-09-17 RX ORDER — SODIUM CHLORIDE 9 MG/ML
1000 INJECTION INTRAMUSCULAR; INTRAVENOUS; SUBCUTANEOUS ONCE
Refills: 0 | Status: COMPLETED | OUTPATIENT
Start: 2021-09-17 | End: 2021-09-17

## 2021-09-17 RX ORDER — ONDANSETRON 8 MG/1
4 TABLET, FILM COATED ORAL ONCE
Refills: 0 | Status: COMPLETED | OUTPATIENT
Start: 2021-09-17 | End: 2021-09-17

## 2021-09-17 RX ORDER — METRONIDAZOLE 500 MG
500 TABLET ORAL ONCE
Refills: 0 | Status: COMPLETED | OUTPATIENT
Start: 2021-09-17 | End: 2021-09-17

## 2021-09-17 RX ADMIN — MORPHINE SULFATE 4 MILLIGRAM(S): 50 CAPSULE, EXTENDED RELEASE ORAL at 13:38

## 2021-09-17 RX ADMIN — Medication 200 MILLIGRAM(S): at 14:08

## 2021-09-17 RX ADMIN — ONDANSETRON 4 MILLIGRAM(S): 8 TABLET, FILM COATED ORAL at 10:20

## 2021-09-17 RX ADMIN — SODIUM CHLORIDE 1000 MILLILITER(S): 9 INJECTION INTRAMUSCULAR; INTRAVENOUS; SUBCUTANEOUS at 10:20

## 2021-09-17 RX ADMIN — MORPHINE SULFATE 4 MILLIGRAM(S): 50 CAPSULE, EXTENDED RELEASE ORAL at 13:16

## 2021-09-17 RX ADMIN — MORPHINE SULFATE 4 MILLIGRAM(S): 50 CAPSULE, EXTENDED RELEASE ORAL at 10:45

## 2021-09-17 RX ADMIN — SODIUM CHLORIDE 1000 MILLILITER(S): 9 INJECTION INTRAMUSCULAR; INTRAVENOUS; SUBCUTANEOUS at 11:20

## 2021-09-17 RX ADMIN — MORPHINE SULFATE 4 MILLIGRAM(S): 50 CAPSULE, EXTENDED RELEASE ORAL at 10:20

## 2021-09-17 RX ADMIN — Medication 100 MILLIGRAM(S): at 14:08

## 2021-09-17 NOTE — ED PROVIDER NOTE - OBJECTIVE STATEMENT
67 y/o F, PMHx Breast Cancer - s/p lumpectomy, DM, Hypothyroidism, HTN & HLD, presents to the ED with complaints of abdominal pain x three days. She admits to lower abdominal discomfort with accompanying nausea; denies vomiting, fever, chills, chest pain, dyspnea, diarrhea, bloody stool and urinary symptoms. Her prior surgical hx includes a cholecystectomy and TAHBSO.

## 2021-09-17 NOTE — ED PROVIDER NOTE - ATTENDING CONTRIBUTION TO CARE
67 yo f with pmhof htn, dm, hypothyroidism, hld, presents with abd pain x 2 days.  pt says started yesterday afternoon.  pain all over, but worse at the lower quadrants.  +n/v x 3 yesterday.  no diarrhea.  no urinary sx.  h/o hysterectomy and cholecystectomy.  exam: nad, ncat, perrl, eomi, mmm, rrr, ctab, abd soft, diffusely ttp, mostly in the RLQ, no rebound, no guarding, nd aox3, no cvat imp: pt with lower abd pain, diffusely ttp, will check labs, antiemetic, ivf and ct a/p

## 2021-09-17 NOTE — ED PROVIDER NOTE - PROGRESS NOTE DETAILS
All labs and imaging studies reviewed with patient and she has been provided a copy. Abx sent to pharmacy. Strict return precautions discussed.

## 2021-09-17 NOTE — ED PROVIDER NOTE - PROVIDER TOKENS
FREE:[LAST:[YOUR PRIMARY CARE PHYSICIAN],PHONE:[(   )    -],FAX:[(   )    -],FOLLOWUP:[1-3 Days]],FREE:[LAST:[YOUR GASTROENTEROLOGIST],PHONE:[(   )    -],FAX:[(   )    -],FOLLOWUP:[Routine]],PROVIDER:[TOKEN:[[id number]],FOLLOWUP:[Routine]]

## 2021-09-17 NOTE — ED PROVIDER NOTE - CARE PROVIDERS DIRECT ADDRESSES
,DirectAddress_Unknown,DirectAddress_Unknown,[email or web address]

## 2021-09-17 NOTE — ED PROVIDER NOTE - CLINICAL SUMMARY MEDICAL DECISION MAKING FREE TEXT BOX
Pt with lower abd pain, found to have acute diverticulitis, started on abx and pt to f/u with gi outpt

## 2021-09-17 NOTE — ED ADULT NURSE NOTE - OBJECTIVE STATEMENT
Patient c/o generalized abdominal pain x 2 days with nausea and multiple episodes of vomiting, patient states she had fever yesterday. On assessment abdomen distended and tender to palpation. Patient denies urinary symptoms, chills, SOB, CP.

## 2021-09-17 NOTE — ED PROVIDER NOTE - PATIENT PORTAL LINK FT
You can access the FollowMyHealth Patient Portal offered by [facility] by registering at the following website: [email or web address]. By joining [employer]’s FollowMyHealth portal, you will also be able to view your health information using other applications (apps) compatible with our system.

## 2021-09-17 NOTE — ED ADULT NURSE NOTE - NSICDXPASTMEDICALHX_GEN_ALL_CORE_FT
PAST MEDICAL HISTORY:  Breast CA     DM (diabetes mellitus)     HTN (hypertension)     Hypothyroid     Overactive bladder

## 2021-09-17 NOTE — ED PROVIDER NOTE - NSFOLLOWUPINSTRUCTIONS_ED_ALL_ED_FT
Diverticulitis    Diverticulitis is infection or inflammation of small pouches (diverticula) in the colon that form due to a condition called diverticulosis. Diverticula can trap stool (feces) and bacteria, causing infection and inflammation.    Diverticulitis may cause severe stomach pain and diarrhea. It may lead to tissue damage in the colon that causes bleeding. The diverticula may also burst (rupture) and cause infected stool to enter other areas of the abdomen.    Complications of diverticulitis can include:  Bleeding.  Severe infection.  Severe pain.  Rupture (perforation) of the colon.  Blockage (obstruction) of the colon.  What are the causes?  This condition is caused by stool becoming trapped in the diverticula, which allows bacteria to grow in the diverticula. This leads to inflammation and infection.    What increases the risk?  You are more likely to develop this condition if:  You have diverticulosis. The risk for diverticulosis increases if:  You are overweight or obese.  You use tobacco products.  You do not get enough exercise.  You eat a diet that does not include enough fiber. High-fiber foods include fruits, vegetables, beans, nuts, and whole grains.  What are the signs or symptoms?  Symptoms of this condition may include:  Pain and tenderness in the abdomen. The pain is normally located on the left side of the abdomen, but it may occur in other areas.  Fever and chills.  Bloating.  Cramping.  Nausea.  Vomiting.  Changes in bowel routines.  Blood in your stool.  How is this diagnosed?  This condition is diagnosed based on:  Your medical history.  A physical exam.  Tests to make sure there is nothing else causing your condition. These tests may include:  Blood tests.  Urine tests.  Imaging tests of the abdomen, including X-rays, ultrasounds, MRIs, or CT scans.  How is this treated?  Most cases of this condition are mild and can be treated at home. Treatment may include:  Taking over-the-counter pain medicines.  Following a clear liquid diet.  Taking antibiotic medicines by mouth.  Rest.  More severe cases may need to be treated at a hospital. Treatment may include:  Not eating or drinking.  Taking prescription pain medicine.  Receiving antibiotic medicines through an IV tube.  Receiving fluids and nutrition through an IV tube.  Surgery.  When your condition is under control, your health care provider may recommend that you have a colonoscopy. This is an exam to look at the entire large intestine. During the exam, a lubricated, bendable tube is inserted into the anus and then passed into the rectum, colon, and other parts of the large intestine. A colonoscopy can show how severe your diverticula are and whether something else may be causing your symptoms.    Follow these instructions at home:  Medicines     Take over-the-counter and prescription medicines only as told by your health care provider. These include fiber supplements, probiotics, and stool softeners.  If you were prescribed an antibiotic medicine, take it as told by your health care provider. Do not stop taking the antibiotic even if you start to feel better.  Do not drive or use heavy machinery while taking prescription pain medicine.  General instructions     Follow a full liquid diet or another diet as directed by your health care provider. After your symptoms improve, your health care provider may tell you to change your diet. He or she may recommend that you eat a diet that contains at least 25 g (25 grams) of fiber daily. Fiber makes it easier to pass stool. Healthy sources of fiber include:  Berries. One cup contains 4–8 grams of fiber.  Beans or lentils. One half cup contains 5–8 grams of fiber.  Green vegetables. One cup contains 4 grams of fiber.  Exercise for at least 30 minutes, 3 times each week. You should exercise hard enough to raise your heart rate and break a sweat.  Keep all follow-up visits as told by your health care provider. This is important. You may need a colonoscopy.  Contact a health care provider if:  Your pain does not improve.  You have a hard time drinking or eating food.  Your bowel movements do not return to normal.  Get help right away if:  Your pain gets worse.  Your symptoms do not get better with treatment.  Your symptoms suddenly get worse.  You have a fever.  You vomit more than one time.  You have stools that are bloody, black, or tarry.  Summary  Diverticulitis is infection or inflammation of small pouches (diverticula) in the colon that form due to a condition called diverticulosis. Diverticula can trap stool (feces) and bacteria, causing infection and inflammation.  You are at higher risk for this condition if you have diverticulosis and you eat a diet that does not include enough fiber.  Most cases of this condition are mild and can be treated at home. More severe cases may need to be treated at a hospital.  When your condition is under control, your health care provider may recommend that you have an exam called a colonoscopy. This exam can show how severe your diverticula are and whether something else may be causing your symptoms.  This information is not intended to replace advice given to you by your health care provider. Make sure you discuss any questions you have with your health care provider.

## 2021-09-17 NOTE — ED PROVIDER NOTE - CARE PROVIDER_API CALL
YOUR PRIMARY CARE PHYSICIAN,   Phone: (   )    -  Fax: (   )    -  Follow Up Time: 1-3 Days    YOUR GASTROENTEROLOGIST,   Phone: (   )    -  Fax: (   )    -  Follow Up Time: Routine    Cait Delgado)  Gastroenterology  [address] [place] [id number]  Phone: [phone]  Fax: [phone]  Follow Up Time: Routine

## 2021-09-18 LAB
CULTURE RESULTS: SIGNIFICANT CHANGE UP
SPECIMEN SOURCE: SIGNIFICANT CHANGE UP

## 2021-09-22 ENCOUNTER — INPATIENT (INPATIENT)
Facility: HOSPITAL | Age: 66
LOS: 2 days | Discharge: HOME HEALTH | End: 2021-09-25
Attending: INTERNAL MEDICINE | Admitting: INTERNAL MEDICINE
Payer: MEDICAID

## 2021-09-22 VITALS
HEART RATE: 118 BPM | DIASTOLIC BLOOD PRESSURE: 106 MMHG | RESPIRATION RATE: 20 BRPM | SYSTOLIC BLOOD PRESSURE: 153 MMHG | HEIGHT: 62 IN | WEIGHT: 158.07 LBS | OXYGEN SATURATION: 98 % | TEMPERATURE: 98 F

## 2021-09-22 LAB
ALBUMIN SERPL ELPH-MCNC: 4.5 G/DL — SIGNIFICANT CHANGE UP (ref 3.5–5.2)
ALP SERPL-CCNC: 76 U/L — SIGNIFICANT CHANGE UP (ref 30–115)
ALT FLD-CCNC: 32 U/L — SIGNIFICANT CHANGE UP (ref 0–41)
ANION GAP SERPL CALC-SCNC: 22 MMOL/L — HIGH (ref 7–14)
AST SERPL-CCNC: 43 U/L — HIGH (ref 0–41)
B-OH-BUTYR SERPL-SCNC: <0.2 MMOL/L — SIGNIFICANT CHANGE UP
BASOPHILS # BLD AUTO: 0.06 K/UL — SIGNIFICANT CHANGE UP (ref 0–0.2)
BASOPHILS NFR BLD AUTO: 0.8 % — SIGNIFICANT CHANGE UP (ref 0–1)
BILIRUB SERPL-MCNC: 0.2 MG/DL — SIGNIFICANT CHANGE UP (ref 0.2–1.2)
BUN SERPL-MCNC: 14 MG/DL — SIGNIFICANT CHANGE UP (ref 10–20)
CALCIUM SERPL-MCNC: 10.3 MG/DL — HIGH (ref 8.5–10.1)
CHLORIDE SERPL-SCNC: 99 MMOL/L — SIGNIFICANT CHANGE UP (ref 98–110)
CO2 SERPL-SCNC: 18 MMOL/L — SIGNIFICANT CHANGE UP (ref 17–32)
CREAT SERPL-MCNC: 1.3 MG/DL — SIGNIFICANT CHANGE UP (ref 0.7–1.5)
EOSINOPHIL # BLD AUTO: 0.13 K/UL — SIGNIFICANT CHANGE UP (ref 0–0.7)
EOSINOPHIL NFR BLD AUTO: 1.6 % — SIGNIFICANT CHANGE UP (ref 0–8)
GLUCOSE BLDC GLUCOMTR-MCNC: 120 MG/DL — HIGH (ref 70–99)
GLUCOSE SERPL-MCNC: 114 MG/DL — HIGH (ref 70–99)
HCT VFR BLD CALC: 39.6 % — SIGNIFICANT CHANGE UP (ref 37–47)
HGB BLD-MCNC: 12.6 G/DL — SIGNIFICANT CHANGE UP (ref 12–16)
IMM GRANULOCYTES NFR BLD AUTO: 0.5 % — HIGH (ref 0.1–0.3)
LIDOCAIN IGE QN: 21 U/L — SIGNIFICANT CHANGE UP (ref 7–60)
LYMPHOCYTES # BLD AUTO: 2.17 K/UL — SIGNIFICANT CHANGE UP (ref 1.2–3.4)
LYMPHOCYTES # BLD AUTO: 27.3 % — SIGNIFICANT CHANGE UP (ref 20.5–51.1)
MCHC RBC-ENTMCNC: 25.9 PG — LOW (ref 27–31)
MCHC RBC-ENTMCNC: 31.8 G/DL — LOW (ref 32–37)
MCV RBC AUTO: 81.5 FL — SIGNIFICANT CHANGE UP (ref 81–99)
MONOCYTES # BLD AUTO: 0.46 K/UL — SIGNIFICANT CHANGE UP (ref 0.1–0.6)
MONOCYTES NFR BLD AUTO: 5.8 % — SIGNIFICANT CHANGE UP (ref 1.7–9.3)
NEUTROPHILS # BLD AUTO: 5.08 K/UL — SIGNIFICANT CHANGE UP (ref 1.4–6.5)
NEUTROPHILS NFR BLD AUTO: 64 % — SIGNIFICANT CHANGE UP (ref 42.2–75.2)
NRBC # BLD: 0 /100 WBCS — SIGNIFICANT CHANGE UP (ref 0–0)
PLATELET # BLD AUTO: 298 K/UL — SIGNIFICANT CHANGE UP (ref 130–400)
POTASSIUM SERPL-MCNC: 4.9 MMOL/L — SIGNIFICANT CHANGE UP (ref 3.5–5)
POTASSIUM SERPL-SCNC: 4.9 MMOL/L — SIGNIFICANT CHANGE UP (ref 3.5–5)
PROT SERPL-MCNC: 7.7 G/DL — SIGNIFICANT CHANGE UP (ref 6–8)
RBC # BLD: 4.86 M/UL — SIGNIFICANT CHANGE UP (ref 4.2–5.4)
RBC # FLD: 14.5 % — SIGNIFICANT CHANGE UP (ref 11.5–14.5)
SARS-COV-2 RNA SPEC QL NAA+PROBE: SIGNIFICANT CHANGE UP
SODIUM SERPL-SCNC: 139 MMOL/L — SIGNIFICANT CHANGE UP (ref 135–146)
WBC # BLD: 7.94 K/UL — SIGNIFICANT CHANGE UP (ref 4.8–10.8)
WBC # FLD AUTO: 7.94 K/UL — SIGNIFICANT CHANGE UP (ref 4.8–10.8)

## 2021-09-22 PROCEDURE — 93010 ELECTROCARDIOGRAM REPORT: CPT

## 2021-09-22 PROCEDURE — 99285 EMERGENCY DEPT VISIT HI MDM: CPT

## 2021-09-22 PROCEDURE — 74177 CT ABD & PELVIS W/CONTRAST: CPT | Mod: 26,MA

## 2021-09-22 PROCEDURE — 71045 X-RAY EXAM CHEST 1 VIEW: CPT | Mod: 26

## 2021-09-22 PROCEDURE — 99221 1ST HOSP IP/OBS SF/LOW 40: CPT | Mod: GC

## 2021-09-22 RX ORDER — LEVOTHYROXINE SODIUM 125 MCG
100 TABLET ORAL DAILY
Refills: 0 | Status: DISCONTINUED | OUTPATIENT
Start: 2021-09-22 | End: 2021-09-25

## 2021-09-22 RX ORDER — SODIUM CHLORIDE 9 MG/ML
1000 INJECTION, SOLUTION INTRAVENOUS
Refills: 0 | Status: DISCONTINUED | OUTPATIENT
Start: 2021-09-22 | End: 2021-09-25

## 2021-09-22 RX ORDER — MORPHINE SULFATE 50 MG/1
2 CAPSULE, EXTENDED RELEASE ORAL EVERY 4 HOURS
Refills: 0 | Status: DISCONTINUED | OUTPATIENT
Start: 2021-09-22 | End: 2021-09-24

## 2021-09-22 RX ORDER — HEPARIN SODIUM 5000 [USP'U]/ML
5000 INJECTION INTRAVENOUS; SUBCUTANEOUS EVERY 8 HOURS
Refills: 0 | Status: DISCONTINUED | OUTPATIENT
Start: 2021-09-22 | End: 2021-09-25

## 2021-09-22 RX ORDER — FLUOXETINE HCL 10 MG
0 CAPSULE ORAL
Qty: 0 | Refills: 0 | DISCHARGE

## 2021-09-22 RX ORDER — CYCLOBENZAPRINE HYDROCHLORIDE 10 MG/1
10 TABLET, FILM COATED ORAL THREE TIMES A DAY
Refills: 0 | Status: DISCONTINUED | OUTPATIENT
Start: 2021-09-22 | End: 2021-09-25

## 2021-09-22 RX ORDER — ONDANSETRON 8 MG/1
4 TABLET, FILM COATED ORAL ONCE
Refills: 0 | Status: COMPLETED | OUTPATIENT
Start: 2021-09-22 | End: 2021-09-22

## 2021-09-22 RX ORDER — DEXTROSE 50 % IN WATER 50 %
15 SYRINGE (ML) INTRAVENOUS ONCE
Refills: 0 | Status: DISCONTINUED | OUTPATIENT
Start: 2021-09-22 | End: 2021-09-25

## 2021-09-22 RX ORDER — OXYBUTYNIN CHLORIDE 5 MG
5 TABLET ORAL
Refills: 0 | Status: DISCONTINUED | OUTPATIENT
Start: 2021-09-22 | End: 2021-09-25

## 2021-09-22 RX ORDER — MORPHINE SULFATE 50 MG/1
4 CAPSULE, EXTENDED RELEASE ORAL ONCE
Refills: 0 | Status: COMPLETED | OUTPATIENT
Start: 2021-09-22 | End: 2021-09-22

## 2021-09-22 RX ORDER — MORPHINE SULFATE 50 MG/1
4 CAPSULE, EXTENDED RELEASE ORAL ONCE
Refills: 0 | Status: DISCONTINUED | OUTPATIENT
Start: 2021-09-22 | End: 2021-09-22

## 2021-09-22 RX ORDER — CALCIUM CARBONATE 500(1250)
2 TABLET ORAL DAILY
Refills: 0 | Status: DISCONTINUED | OUTPATIENT
Start: 2021-09-22 | End: 2021-09-25

## 2021-09-22 RX ORDER — CHLORHEXIDINE GLUCONATE 213 G/1000ML
1 SOLUTION TOPICAL
Refills: 0 | Status: DISCONTINUED | OUTPATIENT
Start: 2021-09-22 | End: 2021-09-25

## 2021-09-22 RX ORDER — ZOLPIDEM TARTRATE 10 MG/1
5 TABLET ORAL AT BEDTIME
Refills: 0 | Status: DISCONTINUED | OUTPATIENT
Start: 2021-09-22 | End: 2021-09-25

## 2021-09-22 RX ORDER — ACETAMINOPHEN 500 MG
650 TABLET ORAL EVERY 6 HOURS
Refills: 0 | Status: DISCONTINUED | OUTPATIENT
Start: 2021-09-22 | End: 2021-09-25

## 2021-09-22 RX ORDER — METRONIDAZOLE 500 MG
500 TABLET ORAL ONCE
Refills: 0 | Status: COMPLETED | OUTPATIENT
Start: 2021-09-22 | End: 2021-09-22

## 2021-09-22 RX ORDER — DEXTROSE 50 % IN WATER 50 %
25 SYRINGE (ML) INTRAVENOUS ONCE
Refills: 0 | Status: DISCONTINUED | OUTPATIENT
Start: 2021-09-22 | End: 2021-09-25

## 2021-09-22 RX ORDER — SODIUM CHLORIDE 9 MG/ML
1000 INJECTION, SOLUTION INTRAVENOUS ONCE
Refills: 0 | Status: COMPLETED | OUTPATIENT
Start: 2021-09-22 | End: 2021-09-22

## 2021-09-22 RX ORDER — ONDANSETRON 8 MG/1
4 TABLET, FILM COATED ORAL EVERY 8 HOURS
Refills: 0 | Status: DISCONTINUED | OUTPATIENT
Start: 2021-09-22 | End: 2021-09-25

## 2021-09-22 RX ORDER — DEXTROSE 50 % IN WATER 50 %
12.5 SYRINGE (ML) INTRAVENOUS ONCE
Refills: 0 | Status: DISCONTINUED | OUTPATIENT
Start: 2021-09-22 | End: 2021-09-25

## 2021-09-22 RX ORDER — CIPROFLOXACIN LACTATE 400MG/40ML
400 VIAL (ML) INTRAVENOUS ONCE
Refills: 0 | Status: COMPLETED | OUTPATIENT
Start: 2021-09-22 | End: 2021-09-22

## 2021-09-22 RX ORDER — GLUCAGON INJECTION, SOLUTION 0.5 MG/.1ML
1 INJECTION, SOLUTION SUBCUTANEOUS ONCE
Refills: 0 | Status: DISCONTINUED | OUTPATIENT
Start: 2021-09-22 | End: 2021-09-25

## 2021-09-22 RX ORDER — PANTOPRAZOLE SODIUM 20 MG/1
40 TABLET, DELAYED RELEASE ORAL
Refills: 0 | Status: DISCONTINUED | OUTPATIENT
Start: 2021-09-22 | End: 2021-09-25

## 2021-09-22 RX ORDER — INSULIN LISPRO 100/ML
VIAL (ML) SUBCUTANEOUS
Refills: 0 | Status: DISCONTINUED | OUTPATIENT
Start: 2021-09-22 | End: 2021-09-25

## 2021-09-22 RX ADMIN — ZOLPIDEM TARTRATE 5 MILLIGRAM(S): 10 TABLET ORAL at 23:56

## 2021-09-22 RX ADMIN — Medication 200 MILLIGRAM(S): at 23:52

## 2021-09-22 RX ADMIN — MORPHINE SULFATE 4 MILLIGRAM(S): 50 CAPSULE, EXTENDED RELEASE ORAL at 19:43

## 2021-09-22 RX ADMIN — SODIUM CHLORIDE 1000 MILLILITER(S): 9 INJECTION, SOLUTION INTRAVENOUS at 18:24

## 2021-09-22 RX ADMIN — MORPHINE SULFATE 4 MILLIGRAM(S): 50 CAPSULE, EXTENDED RELEASE ORAL at 18:32

## 2021-09-22 RX ADMIN — ONDANSETRON 4 MILLIGRAM(S): 8 TABLET, FILM COATED ORAL at 22:12

## 2021-09-22 RX ADMIN — ONDANSETRON 4 MILLIGRAM(S): 8 TABLET, FILM COATED ORAL at 18:32

## 2021-09-22 RX ADMIN — SODIUM CHLORIDE 1000 MILLILITER(S): 9 INJECTION, SOLUTION INTRAVENOUS at 22:12

## 2021-09-22 RX ADMIN — MORPHINE SULFATE 4 MILLIGRAM(S): 50 CAPSULE, EXTENDED RELEASE ORAL at 22:11

## 2021-09-22 RX ADMIN — Medication 100 MILLIGRAM(S): at 23:52

## 2021-09-22 NOTE — H&P ADULT - ATTENDING COMMENTS
HPI:  History of Present Illness  She presented again to the ED on 09/22 for worsening symptoms.  History goes back to Thursday 09/16 when the patient started complaining of diffuse abdominal pain that was sharp in nature, of around 7/10 intensity, and radiating to the back.   It has been associated with T 101 on 09/16, nausea, reduced PO intake, non bilious non projectile non bloody vomiting (around 2-3 episodes per day for the last few days), and watery non bloody dark colored stools (multiple episodes per day despite being on Imodium).   She presented to the ED on 09/17 and was found to have acute diverticulitis.  She is s/p discharge on PO ciprofloxacin 500mg BID and PO Flagyl 500mg four times daily from 09/17-09/26.  Since discharge, patient's diffuse abdominal pain has worsened.  She has not been able to keep anything in due to worsening nausea, non bilious non projectile non bloody vomiting (around 2-3 episodes per day for the last few days), and watery non bloody dark colored stools (multiple episodes per day despite being on Imodium).  REVIEW OF SYSTEMS: see cc/HPI   CONSTITUTIONAL: No weakness, fevers or chills  EYES/ENT: No visual changes;  No vertigo or throat pain   NECK: No pain or stiffness  RESPIRATORY: No cough, wheezing, hemoptysis; No shortness of breath  CARDIOVASCULAR: No chest pain or palpitations  GASTROINTESTINAL: No abdominal or epigastric pain. No nausea, vomiting, or hematemesis; No diarrhea or constipation. No melena or hematochezia.  GENITOURINARY: No dysuria, frequency or hematuria  NEUROLOGICAL: No numbness or weakness  SKIN: No itching, rashes  No recent travel or exposure to recent travelers.  Upon presentation to the ED, the patient's Vital Signs were as follows:  - /106 mmHg  -  bpm  - RR 20 bpm  - T 36.6  - SaO2 98% on RA  Investigations in ED   - CBC  --> WBC 7.94, Hb 12.6, Plt 145    - Chemistry  --> Na 139, K 4.9, AG 22, BUN 14, Cr 1.3, Ca 10.3  --> T bili 0.2, ALP 76, AST 43, ALT 32, Lipase 21  --> BHB <0.2  --> LA 09/17 1.7    - Imaging  --> CT AP IC 09/17 Sigmoid colonic diverticulosis, with moderate mural thickening involving a short segment of sigmoid colon, and associated pericolonicinflammatory changes, compatible with acute sigmoid diverticulitis;  mild thickening of the adjacent urinary bladder dome is likely reactive. Correlation with colonoscopy is suggested when acute episode resolves to evaluate for underlying neoplastic process. Normal caliber appendix. Diffuse hepatic steatosis.  --> CT AP IC 09/22 Acute sigmoid colonic diverticulitis. No evidence of abscess.  --> CXR clear  - Microbiology  --> COVID received  --> Urinalysis 09/17 negative  --> Urine culture 09/17 contaminated  Patient was given IV Ciprofloxacin 400mg x1 dose and IV Metronidazole 500mg x1 dose in ED  She was also given IV Morphine 4mg x1 dose for the pain and IV Zofran 4mg x2 doses for the nausea  She is S/P LR 1L x2 boluses in ED  She will be admitted to the floor as a case of Acute Diverticulitis in the absence of abscess for management with IV antibiotics and for further investigations and monitoring.   (22 Sep 2021 23:09)    Physical Exam:  General: WN/WD NAD  Neurology: A&Ox3, nonfocal, follows commands  Eyes: PERRLA/ EOMI  ENT/Neck: Neck supple, trachea midline, No JVD  Respiratory: CTA B/L, No wheezing, rales, rhonchi  CV: Normal rate regular rhythm, S1S2, no murmurs, rubs or gallops  Abdominal: Soft, NT, ND +BS, obese , (+) tender to pain in the LLQ, stefany-umbilical and suprapubic area   Extremities: No edema, + peripheral pulses  Skin: No Rashes, Hematoma, Ecchymosis  Incisions:   Tubes:    A/P HPI:  History of Present Illness  She presented again to the ED on 09/22 for worsening symptoms.  History goes back to Thursday 09/16 when the patient started complaining of diffuse abdominal pain that was sharp in nature, of around 7/10 intensity, and radiating to the back.   It has been associated with T 101 on 09/16, nausea, reduced PO intake, non bilious non projectile non bloody vomiting (around 2-3 episodes per day for the last few days), and watery non bloody dark colored stools (multiple episodes per day despite being on Imodium).   She presented to the ED on 09/17 and was found to have acute diverticulitis.  She is s/p discharge on PO ciprofloxacin 500mg BID and PO Flagyl 500mg four times daily from 09/17-09/26.  Since discharge, patient's diffuse abdominal pain has worsened.  She has not been able to keep anything in due to worsening nausea, non bilious non projectile non bloody vomiting (around 2-3 episodes per day for the last few days), and watery non bloody dark colored stools (multiple episodes per day despite being on Imodium).  REVIEW OF SYSTEMS: see cc/HPI   CONSTITUTIONAL: No weakness, fevers or chills  EYES/ENT: No visual changes;  No vertigo or throat pain   NECK: No pain or stiffness  RESPIRATORY: No cough, wheezing, hemoptysis; No shortness of breath  CARDIOVASCULAR: No chest pain or palpitations  GASTROINTESTINAL: No abdominal or epigastric pain. No nausea, vomiting, or hematemesis; No diarrhea or constipation. No melena or hematochezia.  GENITOURINARY: No dysuria, frequency or hematuria  NEUROLOGICAL: No numbness or weakness  SKIN: No itching, rashes  No recent travel or exposure to recent travelers.  Upon presentation to the ED, the patient's Vital Signs were as follows:  - /106 mmHg  -  bpm  - RR 20 bpm  - T 36.6  - SaO2 98% on RA  Investigations in ED   - CBC  --> WBC 7.94, Hb 12.6, Plt 145    - Chemistry  --> Na 139, K 4.9, AG 22, BUN 14, Cr 1.3, Ca 10.3  --> T bili 0.2, ALP 76, AST 43, ALT 32, Lipase 21  --> BHB <0.2  --> LA 09/17 1.7    - Imaging  --> CT AP IC 09/17 Sigmoid colonic diverticulosis, with moderate mural thickening involving a short segment of sigmoid colon, and associated pericolonicinflammatory changes, compatible with acute sigmoid diverticulitis;  mild thickening of the adjacent urinary bladder dome is likely reactive. Correlation with colonoscopy is suggested when acute episode resolves to evaluate for underlying neoplastic process. Normal caliber appendix. Diffuse hepatic steatosis.  --> CT AP IC 09/22 Acute sigmoid colonic diverticulitis. No evidence of abscess.  --> CXR clear  - Microbiology  --> COVID received  --> Urinalysis 09/17 negative  --> Urine culture 09/17 contaminated  Patient was given IV Ciprofloxacin 400mg x1 dose and IV Metronidazole 500mg x1 dose in ED  She was also given IV Morphine 4mg x1 dose for the pain and IV Zofran 4mg x2 doses for the nausea  She is S/P LR 1L x2 boluses in ED  She will be admitted to the floor as a case of Acute Diverticulitis in the absence of abscess for management with IV antibiotics and for further investigations and monitoring.   (22 Sep 2021 23:09)    Physical Exam:  General: WN/WD NAD  Neurology: A&Ox3, nonfocal, follows commands  Eyes: PERRLA/ EOMI  ENT/Neck: Neck supple, trachea midline, No JVD  Respiratory: CTA B/L, No wheezing, rales, rhonchi  CV: Normal rate regular rhythm, S1S2, no murmurs, rubs or gallops  Abdominal: Soft, NT, ND +BS, obese , (+) tender to pain in the LLQ, stefany-umbilical and suprapubic area   Extremities: No edema, + peripheral pulses  Skin: No Rashes, Hematoma, Ecchymosis  Incisions:   Tubes:    A/P  Acute sigmoid diverticulitis   -IV abx  -NPO and IV fluids HPI:  History of Present Illness  She presented again to the ED on 09/22 for worsening symptoms.  History goes back to Thursday 09/16 when the patient started complaining of diffuse abdominal pain that was sharp in nature, of around 7/10 intensity, and radiating to the back.   It has been associated with T 101 on 09/16, nausea, reduced PO intake, non bilious non projectile non bloody vomiting (around 2-3 episodes per day for the last few days), and watery non bloody dark colored stools (multiple episodes per day despite being on Imodium).   She presented to the ED on 09/17 and was found to have acute diverticulitis.  She is s/p discharge on PO ciprofloxacin 500mg BID and PO Flagyl 500mg four times daily from 09/17-09/26.  Since discharge, patient's diffuse abdominal pain has worsened.  She has not been able to keep anything in due to worsening nausea, non bilious non projectile non bloody vomiting (around 2-3 episodes per day for the last few days), and watery non bloody dark colored stools (multiple episodes per day despite being on Imodium).  REVIEW OF SYSTEMS: see cc/HPI   CONSTITUTIONAL: No weakness, fevers or chills  EYES/ENT: No visual changes;  No vertigo or throat pain   NECK: No pain or stiffness  RESPIRATORY: No cough, wheezing, hemoptysis; No shortness of breath  CARDIOVASCULAR: No chest pain or palpitations  GASTROINTESTINAL: No abdominal or epigastric pain. No nausea, vomiting, or hematemesis; No diarrhea or constipation. No melena or hematochezia.  GENITOURINARY: No dysuria, frequency or hematuria  NEUROLOGICAL: No numbness or weakness  SKIN: No itching, rashes  No recent travel or exposure to recent travelers.  Upon presentation to the ED, the patient's Vital Signs were as follows:  - /106 mmHg-  bpm- RR 20 bpm- T 36.6- SaO2 98% on RA  Investigations in ED   - CBC  --> WBC 7.94, Hb 12.6, Plt 145  - Chemistry  --> Na 139, K 4.9, AG 22, BUN 14, Cr 1.3, Ca 10.3  --> T bili 0.2, ALP 76, AST 43, ALT 32, Lipase 21  --> BHB <0.2  --> LA 09/17 1.7  - Imaging  --> CT AP IC 09/17 Sigmoid colonic diverticulosis, with moderate mural thickening involving a short segment of sigmoid colon, and associated pericolonicinflammatory changes, compatible with acute sigmoid diverticulitis;  mild thickening of the adjacent urinary bladder dome is likely reactive. Correlation with colonoscopy is suggested when acute episode resolves to evaluate for underlying neoplastic process. Normal caliber appendix. Diffuse hepatic steatosis.  --> CT AP IC 09/22 Acute sigmoid colonic diverticulitis. No evidence of abscess.  --> CXR clear  - Microbiology  --> COVID received  --> Urinalysis 09/17 negative  --> Urine culture 09/17 contaminated  Patient was given IV Ciprofloxacin 400mg x1 dose and IV Metronidazole 500mg x1 dose in ED  She was also given IV Morphine 4mg x1 dose for the pain and IV Zofran 4mg x2 doses for the nausea  She is S/P LR 1L x2 boluses in ED  She will be admitted to the floor as a case of Acute Diverticulitis in the absence of abscess for management with IV antibiotics and for further investigations and monitoring.   (22 Sep 2021 23:09)    Physical Exam:  General: WN/WD NAD  Neurology: A&Ox3, nonfocal, follows commands  Eyes: PERRLA/ EOMI  ENT/Neck: Neck supple, trachea midline, No JVD  Respiratory: CTA B/L, No wheezing, rales, rhonchi  CV: Normal rate regular rhythm, S1S2, no murmurs, rubs or gallops  Abdominal: Soft, NT, ND +BS, obese , (+) tender to pain in the LLQ, stefany-umbilical and suprapubic area   Extremities: No edema, + peripheral pulses  Skin: No Rashes, Hematoma, Ecchymosis  Incisions:   Tubes:    A/P  Acute sigmoid diverticulitis /Diarrhea  -IV abx  -NPO and IV fluids  -check blood Cx  -PRN pain and PRN fever control     MARIANO 2/2 dehydration  HAGMA   -IV hydration   -ABG in AM w/ LA level    Hypothyroidism  -c/w Levothyroxine     Overactive bladder   -c/w tolterodine     H/o Hepatic steatosis   -outpatient follow up / RUQ    H/o L breast cancer  -stable for outpatient follow up    leg cramps   -cyclobenzaprine     DVT prophylaxis

## 2021-09-22 NOTE — ED PROVIDER NOTE - CLINICAL SUMMARY MEDICAL DECISION MAKING FREE TEXT BOX
PT with diagnosis of diverticultivs with progressing symptoms despite abx. Required labs, meds, ct abd. Has persistent diverticulitis. Will admit for further management.

## 2021-09-22 NOTE — H&P ADULT - NSHPPHYSICALEXAM_GEN_ALL_CORE
- Physical Exam in ED  * General Appearance: Alert, cooperative, interactive, oriented to time, place, and person, in some distress from pain  * Head: Normocephalic, without obvious abnormality, atraumatic  * Thyroid:  No enlargement/tenderness/nodules; no carotid bruit or JVD  * Back: Symmetric, no curvature, ROM normal, no CVA tenderness  * Lungs: Respirations unlabored, Good bilateral air entry, normal breath sounds (Clear to auscultation bilaterally, no audible wheezes, crackles, or rhonchi)  * Heart: Regular Rate and Rhythm, normal S1 and S2, no audible murmur, rub, or gallop  * Abdomen: Symmetric, minimally distended, soft, diffusely tender mainly along LLQ with some guarding, bowel sounds active all four quadrants, no masses, no organomegaly (no hepatosplenomegaly)  * Extremities: Extremities normal, atraumatic, no cyanosis, no lower extremity pitting edema bilaterally, adequate dorsalis pedis pulses  * Pulses: 2+ and symmetric all extremities  * Skin: Skin color, texture, turgor normal, no rashes or lesions  * Lymph nodes: Cervical, supraclavicular, and axillary nodes normal  * Neurologic: CNII-XII intact, normal strength, sensation and reflexes throughout

## 2021-09-22 NOTE — ED ADULT NURSE NOTE - OBJECTIVE STATEMENT
pt c/o upper middle abdomen pain x 3 days. pt with recently seen and d/mike on antibiotics but is still having pain, unable to tolerate po. denies fever/chills

## 2021-09-22 NOTE — H&P ADULT - ASSESSMENT
Assessment and Plan  Case of a 66 year old (ex smoker) female patient known to have Overactive Bladder, Hypothyroidism, DM II, GERD/ Hiatal Hernia, and history of left breast cancer with a history of recent presentation to the ED for abdominal pain, fever, nausea, vomiting, and diarrhea on 09/17, found to have acute diverticulitis, s/p discharge on PO ciprofloxacin 500mg BID and PO Flagyl 500mg four times daily from 09/17-09/26. Currently presenting to the ED on 09/22 for worsening abdominal pain, nausea, vomiting, and diarrhea, found to have evidence of persistent acute diverticulitis in the absence of abscess on imaging, to be admitted for IV antibiotics and further monitoring. Currently hemodynamically stable but in some distress due to pain.      Acute Sigmoid Colonic Diverticulitis  No Abscess  * Recent presentation to the ED for abdominal pain, fever, nausea, vomiting, and diarrhea on 09/17, found to have acute diverticulitis, s/p discharge on PO ciprofloxacin 500mg BID and PO Flagyl 500mg four times daily from 09/17-09/26  * Vital Signs /106 mmHg,  bpm, RR 20 bpm, T 36.6, SaO2 98% on RA  * Investigations in ED WBC 7.94, Hb 12.6, Plt 145, Na 139, K 4.9, AG 22, BUN 14, Cr 1.3, Ca 10.3, T bili 0.2, ALP 76, AST 43, ALT 32, Lipase 21, BHB <0.2,  LA 09/17 1.7  * CT AP IC 09/17 Sigmoid colonic diverticulosis, with moderate mural thickening involving a short segment of sigmoid colon, and associated pericolonicinflammatory changes, compatible with acute sigmoid diverticulitis;  mild thickening of the adjacent urinary bladder dome is likely reactive. Correlation with colonoscopy is suggested when acute episode resolves to evaluate for underlying neoplastic process. Normal caliber appendix. Diffuse hepatic steatosis.  * CT AP IC 09/22 Acute sigmoid colonic diverticulitis. No evidence of abscess.  * S/P IV Ciprofloxacin 400mg x1 dose and IV Metronidazole 500mg x1 dose in ED  * S/P IV Morphine 4mg x1 dose for the pain and IV Zofran 4mg x2 doses for the nausea  * S/P LR 1L x2 boluses in ED    - Follow up Gastroenterology Team recommendations  - Monitor T for fever: afebrile since admission  - Trend WBC: no leukocytosis  - Will start IV Ciprofloxacin and IV Metronidazole. S/P IV Ciprofloxacin 400mg x1 dose and IV Metronidazole 500mg x1 dose in ED  - Monitor pain and keep score at 01/10. Tylenol 650mg Q6h PRN for mild pain and IV Morphine 2mg Q4h PRN for severe pain. S/P IV Morphine 4mg x1 dose for the pain in ED.  - Monitor nausea and vomiting: continue IV Zofran 4mg Q8h PRN. S/P IV Zofran 4mg x2 doses for the nausea in ED  - Monitor BM for diarrhea: start IV fluid hydration with LR at 50mL/hour. S/P LR 1L x2 boluses in ED.  - Encourage High Fiber Diet      Acute Kidney Injury  HAGMA - Likely from Diarrhea  * Could be pre-renal due to vomiting and diarrhea despite no BUN:Cr 20:1   * Baseline Cr 0.8 09/17     - Start IV fluid hydration with LR at 50mL/hour. S/P LR 1L x2 boluses in ED.  - Monitor BUN and Cr daily  - Accurate in/out  - Follow up urine studies  - Avoid nephrotoxic agents      Hepatic Steatosis  * Noted on CT AP IC  * T desiree 0.2, ALP 76, AST 43, ALT 32, Lipase 21  - Trend CMP  - Alcohol cessation (occasional per patient)  - Encourage weight loss and low fat diet  - RUQ US will not affect management for now      Overactive Bladder  * Home med Tolterodine 4mg QD but ran out of it recently  - Resume Tolterodine 4mg QD and prescribe on discharge      Hypothyroidism  * No recent TSH  * Home med Levothyroxine 100mcg QD  - Check TSH  - Resume Levothyroxine 100mcg QD      DM Type II  * No recent Hba1c  * Diet Controlled  - Check Hba1c  - Monitor POCT premeals and at bedtime  - Sliding Scale B      Left Breast Cancer  * Currently in remission for the last 5 years  - Outpatient follow up as needed      Hiatal Hernia and GERD  * Home med Omeprazole 40mg QD  - Start Protonix 40mg QD  - Weight loss with life style modifications      History of Right Rotator Cuff Tear   Leg Cramps  * History of Right Rotator Cuff Tear s/p no surgical intervention. Home med Endocet 10/325 as needed (prescribed by Dr in [place])  * Leg Cramps on Cyclobenzaprine 10mg BID-TID per patient  - Monitor pain and keep score at 01/10. Tylenol 650mg Q6h PRN for mild pain and IV Morphine 2mg Q4h PRN for severe pain. S/P IV Morphine 4mg x1 dose for the pain in ED.  - PT      Others  - DVT Prophylaxis: Heparin 5000 Q8h SC  - GI Prophylaxis: Pantoprazole 40mg PO QD  - Diet: High Fiber DASH TLC  - Code Status: Full      Barriers to learning: NO  Discharge Planning: Patient will be discharged once stable   Plan was communicated with patient and medical team      Alina Rhoades MD  PGY - 2 Internal Medicine   [facility]   Assessment and Plan  Case of a 66 year old (ex smoker) female patient known to have Overactive Bladder, Hypothyroidism, DM II, GERD/ Hiatal Hernia, and history of left breast cancer with a history of recent presentation to the ED for abdominal pain, fever, nausea, vomiting, and diarrhea on 09/17, found to have acute diverticulitis, s/p discharge on PO ciprofloxacin 500mg BID and PO Flagyl 500mg four times daily from 09/17-09/26. Currently presenting to the ED on 09/22 for worsening abdominal pain, nausea, vomiting, and diarrhea, found to have evidence of persistent acute diverticulitis in the absence of abscess on imaging, to be admitted for IV antibiotics and further monitoring. Currently hemodynamically stable but in some distress due to pain.      Acute Sigmoid Colonic Diverticulitis  No Abscess  * Recent presentation to the ED for abdominal pain, fever, nausea, vomiting, and diarrhea on 09/17, found to have acute diverticulitis, s/p discharge on PO ciprofloxacin 500mg BID and PO Flagyl 500mg four times daily from 09/17-09/26  * Vital Signs /106 mmHg,  bpm, RR 20 bpm, T 36.6, SaO2 98% on RA  * Investigations in ED WBC 7.94, Hb 12.6, Plt 145, Na 139, K 4.9, AG 22, BUN 14, Cr 1.3, Ca 10.3, T bili 0.2, ALP 76, AST 43, ALT 32, Lipase 21, BHB <0.2,  LA 09/17 1.7  * CT AP IC 09/17 Sigmoid colonic diverticulosis, with moderate mural thickening involving a short segment of sigmoid colon, and associated pericolonicinflammatory changes, compatible with acute sigmoid diverticulitis;  mild thickening of the adjacent urinary bladder dome is likely reactive. Correlation with colonoscopy is suggested when acute episode resolves to evaluate for underlying neoplastic process. Normal caliber appendix. Diffuse hepatic steatosis.  * CT AP IC 09/22 Acute sigmoid colonic diverticulitis. No evidence of abscess.  * S/P IV Ciprofloxacin 400mg x1 dose and IV Metronidazole 500mg x1 dose in ED  * S/P IV Morphine 4mg x1 dose for the pain and IV Zofran 4mg x2 doses for the nausea  * S/P LR 1L x2 boluses in ED    - Follow up Gastroenterology Team recommendations  - Monitor T for fever: afebrile since admission  - Trend WBC: no leukocytosis  - Will start IV Ciprofloxacin and IV Metronidazole. S/P IV Ciprofloxacin 400mg x1 dose and IV Metronidazole 500mg x1 dose in ED  - Monitor pain and keep score at 01/10. Tylenol 650mg Q6h PRN for mild pain and IV Morphine 2mg Q4h PRN for severe pain. S/P IV Morphine 4mg x1 dose for the pain in ED.  - Monitor nausea and vomiting: continue IV Zofran 4mg Q8h PRN. S/P IV Zofran 4mg x2 doses for the nausea in ED  - Monitor BM for diarrhea: start IV fluid hydration with LR at 50mL/hour. S/P LR 1L x2 boluses in ED.  - Follow up blood culture and stool culture once collected  - Follow up ESR, CRP, and LA in AM  - Encourage High Fiber Diet      Acute Kidney Injury  HAGMA - Likely from Diarrhea; Rule Out Lactic Acidosis  * Could be pre-renal due to vomiting and diarrhea despite no BUN:Cr 20:1   * Baseline Cr 0.8 09/17   * LA 1.7 09/17    - Start IV fluid hydration with LR at 50mL/hour. S/P LR 1L x2 boluses in ED.  - Monitor BUN and Cr daily  - Accurate in/out  - Follow up urine studies  - Avoid nephrotoxic agents  - Follow up LA in AM. LA 1.7 09/17      Hepatic Steatosis  * Noted on CT AP IC  * T desiree 0.2, ALP 76, AST 43, ALT 32, Lipase 21  - Trend CMP  - Alcohol cessation (occasional per patient)  - Encourage weight loss and low fat diet  - RUQ US will not affect management for now      Overactive Bladder  * Home med Tolterodine 4mg QD but ran out of it recently  - Resume Tolterodine 4mg QD and prescribe on discharge      Hypothyroidism  * No recent TSH  * Home med Levothyroxine 100mcg QD  - Check TSH  - Resume Levothyroxine 100mcg QD      DM Type II  * No recent Hba1c  * Diet Controlled  - Check Hba1c  - Monitor POCT premeals and at bedtime  - Sliding Scale B      Left Breast Cancer  * Currently in remission for the last 5 years  - Outpatient follow up as needed      Hiatal Hernia and GERD  * Home med Omeprazole 40mg QD  - Start Protonix 40mg QD  - Weight loss with life style modifications      History of Right Rotator Cuff Tear   Leg Cramps  * History of Right Rotator Cuff Tear s/p no surgical intervention. Home med Endocet 10/325 as needed (prescribed by Dr in [place])  * Leg Cramps on Cyclobenzaprine 10mg BID-TID per patient  - Monitor pain and keep score at 01/10. Tylenol 650mg Q6h PRN for mild pain and IV Morphine 2mg Q4h PRN for severe pain. S/P IV Morphine 4mg x1 dose for the pain in ED.  - PT      Others  - DVT Prophylaxis: Heparin 5000 Q8h SC  - GI Prophylaxis: Pantoprazole 40mg PO QD  - Diet: High Fiber DASH TLC  - Code Status: Full      Barriers to learning: NO  Discharge Planning: Patient will be discharged once stable   Plan was communicated with patient and medical team      Alina Rhoades MD  PGY - 2 Internal Medicine   [facility]

## 2021-09-22 NOTE — H&P ADULT - NSHPSOCIALHISTORY_GEN_ALL_CORE
- Smoking History: ex smoker; Quit 4 months ago  - Alcohol Intake: occasional  - Substance Abuse: never per patient  - Living Conditions: from home

## 2021-09-22 NOTE — ED PROVIDER NOTE - ATTENDING CONTRIBUTION TO CARE
67 yo f with pmho f breast ca, htn, hypothyroidism, recently dx diverticulitis, presents with c/o n/v/d and fever.  pt was seen in ED and had CT showing acute diverticulitis.  pt was dc with rx for cipro and flagyl.  pt says despite abx, she has still been having fevers for last 3 days.  also having difficulty tolerating po and her po meds.  feels weak.  abd pain has been unchanged.  exam: nad, ncat, perrl, eomi, dry mm, rrr, ctab, abd soft, ttp LLQ imp: pt with diverticulitis, feeling weaker and now with inability to tolerate po, will check labs and rpt CT to r/o any complications of divertiiculitis

## 2021-09-22 NOTE — H&P ADULT - HISTORY OF PRESENT ILLNESS
History of Present Illness    Ms. Johnson is a 66 year old (ex smoker) female patient known to have:  - Overactive Bladder. Home med Tolterodine 4mg QD but ran out of it recently  - Hypothyroidism. No recent TSH. Home med Levothyroxine 100mcg QD  - DM Type II. No recent Hba1c. Diet Controlled  - Left Breast Cancer. Currently in remission for the last 5 years  - Hiatal Hernia and GERD. Home med Omeprazole 40mg QD  - History of Right Rotator Cuff Tear s/p no surgical intervention. Home med Endocet 10/325 as needed (prescribed by Dr in [place])  - Leg Cramps on Cyclobenzaprine 10mg BID-TID per patient  - S/P Hysterectomy  - Recent presentation to the ED for abdominal pain, fever, nausea, vomiting, and diarrhea on 09/17, found to have acute diverticulitis, s/p discharge on PO ciprofloxacin 500mg BID and PO Flagyl 500mg four times daily from 09/17-09/26      She presented again to the ED on 09/22 for worsening symptoms.  History goes back to Thursday 09/16 when the patient started complaining of diffuse abdominal pain that was sharp in nature, of around 7/10 intensity, and radiating to the back.   It has been associated with T 101 on 09/16, nausea, reduced PO intake, non bilious non projectile non bloody vomiting (around 2-3 episodes per day for the last few days), and watery non bloody dark colored stools (multiple episodes per day despite being on Imodium).   She presented to the ED on 09/17 and was found to have acute diverticulitis.  She is s/p discharge on PO ciprofloxacin 500mg BID and PO Flagyl 500mg four times daily from 09/17-09/26.  Since discharge, patient's diffuse abdominal pain has worsened.  She has not been able to keep anything in due to worsening nausea, non bilious non projectile non bloody vomiting (around 2-3 episodes per day for the last few days), and watery non bloody dark colored stools (multiple episodes per day despite being on Imodium).      On review of systems, patient denies any chills, night sweats, URTI symptoms (cough, rhinorrhea, sore throat), urinary symptoms (urinary frequency, urgency, intermittence, dysuria, foul smelling urine, cloudy urine), or headache.   No sick contacts.   No recent travel or exposure to recent travelers.      Upon presentation to the ED, the patient's Vital Signs were as follows:  - /106 mmHg  -  bpm  - RR 20 bpm  - T 36.6  - SaO2 98% on RA      Investigations in ED   - CBC  --> WBC 7.94, Hb 12.6, Plt 145    - Chemistry  --> Na 139, K 4.9, AG 22, BUN 14, Cr 1.3, Ca 10.3  --> T bili 0.2, ALP 76, AST 43, ALT 32, Lipase 21  --> BHB <0.2  --> LA 09/17 1.7    - Imaging  --> CT AP IC 09/17 Sigmoid colonic diverticulosis, with moderate mural thickening involving a short segment of sigmoid colon, and associated pericolonicinflammatory changes, compatible with acute sigmoid diverticulitis;  mild thickening of the adjacent urinary bladder dome is likely reactive. Correlation with colonoscopy is suggested when acute episode resolves to evaluate for underlying neoplastic process. Normal caliber appendix. Diffuse hepatic steatosis.  --> CT AP IC 09/22 Acute sigmoid colonic diverticulitis. No evidence of abscess.  --> CXR clear    - Microbiology  --> COVID received  --> Urinalysis 09/17 negative  --> Urine culture 09/17 contaminated      Patient was given IV Ciprofloxacin 400mg x1 dose and IV Metronidazole 500mg x1 dose in ED  She was also given IV Morphine 4mg x1 dose for the pain and IV Zofran 4mg x2 doses for the nausea  She is S/P LR 1L x2 boluses in ED  She will be admitted to the floor as a case of Acute Diverticulitis in the absence of abscess for management with IV antibiotics and for further investigations and monitoring.

## 2021-09-22 NOTE — ED PROVIDER NOTE - PROGRESS NOTE DETAILS
AN: Sign out from Dr. Fregoso. Pt with known diverticulitis with worsening n/v and anorexia. Requied labs and repeat CT. Pending results and will dispo accordingly.

## 2021-09-22 NOTE — ED PROVIDER NOTE - OBJECTIVE STATEMENT
66 y.o. female with a PMH of hypothyroidism, Left breast ca. (in remission for the past five years), HTN, and OAB presented to the ER c/o diffuse abdominal pain for the past week.  (+) nausea/vomiting/diarrhea.  (+) subjective fever 3 days ago.  Pt has been taking po Cipro and Flagyl for the past five days but symptoms not getting any better.  Denies any urinary symptoms.  No other complaints.

## 2021-09-23 LAB
A1C WITH ESTIMATED AVERAGE GLUCOSE RESULT: 7.3 % — HIGH (ref 4–5.6)
ALBUMIN SERPL ELPH-MCNC: 4.6 G/DL — SIGNIFICANT CHANGE UP (ref 3.5–5.2)
ALP SERPL-CCNC: 73 U/L — SIGNIFICANT CHANGE UP (ref 30–115)
ALT FLD-CCNC: 27 U/L — SIGNIFICANT CHANGE UP (ref 0–41)
ANION GAP SERPL CALC-SCNC: 15 MMOL/L — HIGH (ref 7–14)
APPEARANCE UR: CLEAR — SIGNIFICANT CHANGE UP
AST SERPL-CCNC: 26 U/L — SIGNIFICANT CHANGE UP (ref 0–41)
BASOPHILS # BLD AUTO: 0.04 K/UL — SIGNIFICANT CHANGE UP (ref 0–0.2)
BASOPHILS NFR BLD AUTO: 0.6 % — SIGNIFICANT CHANGE UP (ref 0–1)
BILIRUB SERPL-MCNC: 0.3 MG/DL — SIGNIFICANT CHANGE UP (ref 0.2–1.2)
BILIRUB UR-MCNC: NEGATIVE — SIGNIFICANT CHANGE UP
BUN SERPL-MCNC: 10 MG/DL — SIGNIFICANT CHANGE UP (ref 10–20)
CALCIUM SERPL-MCNC: 9.3 MG/DL — SIGNIFICANT CHANGE UP (ref 8.5–10.1)
CHLORIDE SERPL-SCNC: 102 MMOL/L — SIGNIFICANT CHANGE UP (ref 98–110)
CHLORIDE UR-SCNC: 52 — SIGNIFICANT CHANGE UP
CHOLEST SERPL-MCNC: 81 MG/DL — SIGNIFICANT CHANGE UP
CO2 SERPL-SCNC: 23 MMOL/L — SIGNIFICANT CHANGE UP (ref 17–32)
COLOR SPEC: COLORLESS — SIGNIFICANT CHANGE UP
CREAT ?TM UR-MCNC: 26 MG/DL — SIGNIFICANT CHANGE UP
CREAT SERPL-MCNC: 0.8 MG/DL — SIGNIFICANT CHANGE UP (ref 0.7–1.5)
CRP SERPL-MCNC: <3 MG/L — SIGNIFICANT CHANGE UP
DIFF PNL FLD: NEGATIVE — SIGNIFICANT CHANGE UP
EOSINOPHIL # BLD AUTO: 0.19 K/UL — SIGNIFICANT CHANGE UP (ref 0–0.7)
EOSINOPHIL NFR BLD AUTO: 2.7 % — SIGNIFICANT CHANGE UP (ref 0–8)
ERYTHROCYTE [SEDIMENTATION RATE] IN BLOOD: 10 MM/HR — SIGNIFICANT CHANGE UP (ref 0–20)
ESTIMATED AVERAGE GLUCOSE: 163 MG/DL — HIGH (ref 68–114)
GLUCOSE BLDC GLUCOMTR-MCNC: 123 MG/DL — HIGH (ref 70–99)
GLUCOSE BLDC GLUCOMTR-MCNC: 155 MG/DL — HIGH (ref 70–99)
GLUCOSE BLDC GLUCOMTR-MCNC: 87 MG/DL — SIGNIFICANT CHANGE UP (ref 70–99)
GLUCOSE BLDC GLUCOMTR-MCNC: 95 MG/DL — SIGNIFICANT CHANGE UP (ref 70–99)
GLUCOSE SERPL-MCNC: 116 MG/DL — HIGH (ref 70–99)
GLUCOSE UR QL: NEGATIVE — SIGNIFICANT CHANGE UP
HCT VFR BLD CALC: 36.4 % — LOW (ref 37–47)
HCV AB S/CO SERPL IA: 0.04 COI — SIGNIFICANT CHANGE UP
HCV AB SERPL-IMP: SIGNIFICANT CHANGE UP
HDLC SERPL-MCNC: 37 MG/DL — LOW
HGB BLD-MCNC: 11.9 G/DL — LOW (ref 12–16)
IMM GRANULOCYTES NFR BLD AUTO: 0.4 % — HIGH (ref 0.1–0.3)
KETONES UR-MCNC: NEGATIVE — SIGNIFICANT CHANGE UP
LACTATE SERPL-SCNC: 1.3 MMOL/L — SIGNIFICANT CHANGE UP (ref 0.7–2)
LEUKOCYTE ESTERASE UR-ACNC: NEGATIVE — SIGNIFICANT CHANGE UP
LIPID PNL WITH DIRECT LDL SERPL: 31 MG/DL — SIGNIFICANT CHANGE UP
LYMPHOCYTES # BLD AUTO: 2.28 K/UL — SIGNIFICANT CHANGE UP (ref 1.2–3.4)
LYMPHOCYTES # BLD AUTO: 32.2 % — SIGNIFICANT CHANGE UP (ref 20.5–51.1)
MAGNESIUM SERPL-MCNC: 1.7 MG/DL — LOW (ref 1.8–2.4)
MCHC RBC-ENTMCNC: 26.6 PG — LOW (ref 27–31)
MCHC RBC-ENTMCNC: 32.7 G/DL — SIGNIFICANT CHANGE UP (ref 32–37)
MCV RBC AUTO: 81.3 FL — SIGNIFICANT CHANGE UP (ref 81–99)
MONOCYTES # BLD AUTO: 0.56 K/UL — SIGNIFICANT CHANGE UP (ref 0.1–0.6)
MONOCYTES NFR BLD AUTO: 7.9 % — SIGNIFICANT CHANGE UP (ref 1.7–9.3)
NEUTROPHILS # BLD AUTO: 3.98 K/UL — SIGNIFICANT CHANGE UP (ref 1.4–6.5)
NEUTROPHILS NFR BLD AUTO: 56.2 % — SIGNIFICANT CHANGE UP (ref 42.2–75.2)
NITRITE UR-MCNC: NEGATIVE — SIGNIFICANT CHANGE UP
NON HDL CHOLESTEROL: 44 MG/DL — SIGNIFICANT CHANGE UP
NRBC # BLD: 0 /100 WBCS — SIGNIFICANT CHANGE UP (ref 0–0)
PH UR: 7 — SIGNIFICANT CHANGE UP (ref 5–8)
PHOSPHATE SERPL-MCNC: 5.3 MG/DL — HIGH (ref 2.1–4.9)
PLATELET # BLD AUTO: 304 K/UL — SIGNIFICANT CHANGE UP (ref 130–400)
POTASSIUM SERPL-MCNC: 3.9 MMOL/L — SIGNIFICANT CHANGE UP (ref 3.5–5)
POTASSIUM SERPL-SCNC: 3.9 MMOL/L — SIGNIFICANT CHANGE UP (ref 3.5–5)
PROT SERPL-MCNC: 7.2 G/DL — SIGNIFICANT CHANGE UP (ref 6–8)
PROT UR-MCNC: NEGATIVE — SIGNIFICANT CHANGE UP
RBC # BLD: 4.48 M/UL — SIGNIFICANT CHANGE UP (ref 4.2–5.4)
RBC # FLD: 14.5 % — SIGNIFICANT CHANGE UP (ref 11.5–14.5)
SODIUM SERPL-SCNC: 140 MMOL/L — SIGNIFICANT CHANGE UP (ref 135–146)
SODIUM UR-SCNC: 64 MMOL/L — SIGNIFICANT CHANGE UP
SP GR SPEC: 1.01 — SIGNIFICANT CHANGE UP (ref 1.01–1.03)
TRIGL SERPL-MCNC: 50 MG/DL — SIGNIFICANT CHANGE UP
TSH SERPL-MCNC: 12.56 UIU/ML — HIGH (ref 0.27–4.2)
UROBILINOGEN FLD QL: SIGNIFICANT CHANGE UP
UUN UR-MCNC: 158 MG/DL — SIGNIFICANT CHANGE UP
WBC # BLD: 7.08 K/UL — SIGNIFICANT CHANGE UP (ref 4.8–10.8)
WBC # FLD AUTO: 7.08 K/UL — SIGNIFICANT CHANGE UP (ref 4.8–10.8)

## 2021-09-23 PROCEDURE — 99232 SBSQ HOSP IP/OBS MODERATE 35: CPT

## 2021-09-23 PROCEDURE — 71045 X-RAY EXAM CHEST 1 VIEW: CPT | Mod: 26

## 2021-09-23 PROCEDURE — 99223 1ST HOSP IP/OBS HIGH 75: CPT

## 2021-09-23 RX ORDER — CIPROFLOXACIN LACTATE 400MG/40ML
400 VIAL (ML) INTRAVENOUS EVERY 12 HOURS
Refills: 0 | Status: DISCONTINUED | OUTPATIENT
Start: 2021-09-23 | End: 2021-09-25

## 2021-09-23 RX ORDER — METRONIDAZOLE 500 MG
500 TABLET ORAL EVERY 8 HOURS
Refills: 0 | Status: DISCONTINUED | OUTPATIENT
Start: 2021-09-23 | End: 2021-09-25

## 2021-09-23 RX ORDER — INFLUENZA VIRUS VACCINE 15; 15; 15; 15 UG/.5ML; UG/.5ML; UG/.5ML; UG/.5ML
0.5 SUSPENSION INTRAMUSCULAR ONCE
Refills: 0 | Status: DISCONTINUED | OUTPATIENT
Start: 2021-09-23 | End: 2021-09-25

## 2021-09-23 RX ADMIN — Medication 100 MICROGRAM(S): at 06:03

## 2021-09-23 RX ADMIN — HEPARIN SODIUM 5000 UNIT(S): 5000 INJECTION INTRAVENOUS; SUBCUTANEOUS at 14:01

## 2021-09-23 RX ADMIN — MORPHINE SULFATE 2 MILLIGRAM(S): 50 CAPSULE, EXTENDED RELEASE ORAL at 04:24

## 2021-09-23 RX ADMIN — PANTOPRAZOLE SODIUM 40 MILLIGRAM(S): 20 TABLET, DELAYED RELEASE ORAL at 06:03

## 2021-09-23 RX ADMIN — MORPHINE SULFATE 2 MILLIGRAM(S): 50 CAPSULE, EXTENDED RELEASE ORAL at 17:02

## 2021-09-23 RX ADMIN — ZOLPIDEM TARTRATE 5 MILLIGRAM(S): 10 TABLET ORAL at 21:32

## 2021-09-23 RX ADMIN — HEPARIN SODIUM 5000 UNIT(S): 5000 INJECTION INTRAVENOUS; SUBCUTANEOUS at 21:09

## 2021-09-23 RX ADMIN — Medication 100 MILLIGRAM(S): at 13:59

## 2021-09-23 RX ADMIN — SODIUM CHLORIDE 50 MILLILITER(S): 9 INJECTION, SOLUTION INTRAVENOUS at 08:08

## 2021-09-23 RX ADMIN — MORPHINE SULFATE 2 MILLIGRAM(S): 50 CAPSULE, EXTENDED RELEASE ORAL at 08:50

## 2021-09-23 RX ADMIN — MORPHINE SULFATE 2 MILLIGRAM(S): 50 CAPSULE, EXTENDED RELEASE ORAL at 15:59

## 2021-09-23 RX ADMIN — HEPARIN SODIUM 5000 UNIT(S): 5000 INJECTION INTRAVENOUS; SUBCUTANEOUS at 06:03

## 2021-09-23 RX ADMIN — Medication 100 MILLIGRAM(S): at 21:10

## 2021-09-23 RX ADMIN — Medication 5 MILLIGRAM(S): at 06:03

## 2021-09-23 RX ADMIN — Medication 2 TABLET(S): at 13:57

## 2021-09-23 RX ADMIN — Medication 200 MILLIGRAM(S): at 17:30

## 2021-09-23 RX ADMIN — CYCLOBENZAPRINE HYDROCHLORIDE 10 MILLIGRAM(S): 10 TABLET, FILM COATED ORAL at 06:41

## 2021-09-23 RX ADMIN — MORPHINE SULFATE 2 MILLIGRAM(S): 50 CAPSULE, EXTENDED RELEASE ORAL at 09:15

## 2021-09-23 RX ADMIN — Medication 5 MILLIGRAM(S): at 17:30

## 2021-09-23 RX ADMIN — CYCLOBENZAPRINE HYDROCHLORIDE 10 MILLIGRAM(S): 10 TABLET, FILM COATED ORAL at 21:32

## 2021-09-23 NOTE — CONSULT NOTE ADULT - SUBJECTIVE AND OBJECTIVE BOX
Patient is a 66y old  Female who presents with a chief complaint of Acute Diverticulitis (22 Sep 2021 23:09)      HPI:  History of Present Illness    Ms. Elizabeth Bryson is a 66 year old woman (former smoker) with PMHx of GERD (managed by Omeprazole 40mg QD), Hiatal hernia, Cholecystectomy, hypothyroidism, LT breast ca (remission x 5 years), Type II DM and overactive bladder who presents with 1 week history of worsening sharp, diffuse abdominal pain radiating to the back, nausea, vomiting, and diarrhea. She presented to the ED on 09/17 for similar symptoms, CT Abd/Pelvis demonstrated acute diverticulitis and d/c'd on PO Cipro 500mg BID and Flagyl 500mg TID. She was readmitted on 09/22, labs showed no leukocytosis, lipase-21 and repeat CT Abd/Pelvis continued to show Acute sigmoid diverticulitis and was started on IV Cipro 400mg BID and Flagyl 500mg TID. She reports that since her discharge from the hospital her symptoms have not improved despite compliance to oral ABX. She states that the abdominal pain is currently 9/10 in severity, worse with straining/coughing with no known alleviating factors. Nausea is still present and last episode of vomiting was 1x-last night). Patient reports that her diarrhea has fully resolved. She denies overnight fevers, chills, chest pain, bloody/bilious vomiting, hematochezia, dysuria, hematuria, recent travel,  recent abd surgeries and exposure to known sick contacts. Last Colonoscopy: 04/2021; Last Endoscopy: patient unsure      Upon presentation to the ED, the patient's Vital Signs were as follows:  - /106 mmHg  -  bpm  - RR 20 bpm  - T 36.6  - SaO2 98% on RA      Investigations in ED   - CBC  --> WBC 7.94, Hb 12.6, Plt 145    - Chemistry  --> Na 139, K 4.9, AG 22, BUN 14, Cr 1.3, Ca 10.3  --> T bili 0.2, ALP 76, AST 43, ALT 32, Lipase 21  --> BHB <0.2  --> LA 09/17 1.7    - Imaging  --> CT AP IC 09/17 Sigmoid colonic diverticulosis, with moderate mural thickening involving a short segment of sigmoid colon, and associated pericolonicinflammatory changes, compatible with acute sigmoid diverticulitis;  mild thickening of the adjacent urinary bladder dome is likely reactive. Correlation with colonoscopy is suggested when acute episode resolves to evaluate for underlying neoplastic process. Normal caliber appendix. Diffuse hepatic steatosis.  --> CT AP IC 09/22 Acute sigmoid colonic diverticulitis. No evidence of abscess.  --> CXR clear    - Microbiology  --> COVID received  --> Urinalysis 09/17 negative  --> Urine culture 09/17 contaminated      Patient was given IV Ciprofloxacin 400mg x1 dose and IV Metronidazole 500mg x1 dose in ED  She was also given IV Morphine 4mg x1 dose for the pain and IV Zofran 4mg x2 doses for the nausea  She is S/P LR 1L x2 boluses in ED  She will be admitted to the floor as a case of Acute Diverticulitis in the absence of abscess for management with IV antibiotics and for further investigations and monitoring.   (22 Sep 2021 23:09)      PAST MEDICAL & SURGICAL HISTORY:  Breast CA    HTN (hypertension)    DM (diabetes mellitus)    Hypothyroid    Overactive bladder        MEDICATIONS  (STANDING):  calcium carbonate    500 mG (Tums) Chewable 2 Tablet(s) Chew daily  chlorhexidine 4% Liquid 1 Application(s) Topical <User Schedule>  ciprofloxacin   IVPB 400 milliGRAM(s) IV Intermittent every 12 hours  dextrose 40% Gel 15 Gram(s) Oral once  dextrose 5%. 1000 milliLiter(s) (50 mL/Hr) IV Continuous <Continuous>  dextrose 5%. 1000 milliLiter(s) (100 mL/Hr) IV Continuous <Continuous>  dextrose 50% Injectable 25 Gram(s) IV Push once  dextrose 50% Injectable 12.5 Gram(s) IV Push once  dextrose 50% Injectable 25 Gram(s) IV Push once  glucagon  Injectable 1 milliGRAM(s) IntraMuscular once  heparin SubCutaneous Injection - Peds 5000 Unit(s) SubCutaneous every 8 hours  influenza   Vaccine 0.5 milliLiter(s) IntraMuscular once  insulin lispro (ADMELOG) corrective regimen sliding scale   SubCutaneous three times a day before meals  lactated ringers. 1000 milliLiter(s) (50 mL/Hr) IV Continuous <Continuous>  levothyroxine  Oral Tab/Cap - Peds 100 MICROGram(s) Oral daily  metroNIDAZOLE  IVPB 500 milliGRAM(s) IV Intermittent every 8 hours  oxybutynin 5 milliGRAM(s) Oral two times a day  pantoprazole    Tablet 40 milliGRAM(s) Oral before breakfast    MEDICATIONS  (PRN):  acetaminophen   Tablet .. 650 milliGRAM(s) Oral every 6 hours PRN Mild Pain (1 - 3)  cyclobenzaprine 10 milliGRAM(s) Oral three times a day PRN Muscle Spasm  morphine  - Injectable 2 milliGRAM(s) IV Push every 4 hours PRN Severe Pain (7 - 10)  ondansetron Injectable 4 milliGRAM(s) IV Push every 8 hours PRN Nausea and/or Vomiting  zolpidem 5 milliGRAM(s) Oral at bedtime PRN Insomnia      Allergies    Bactrim (Anaphylaxis)  Keflex (Anaphylaxis)    Intolerances        REVIEW OF SYSTEMS:    CONSTITUTIONAL: No weakness, fevers or chills  RESPIRATORY: No cough, wheezing, hemoptysis; No shortness of breath  CARDIOVASCULAR: No chest pain or palpitations  GASTROINTESTINAL: (+) Abdominal pain .(+) nausea, No hematemesis; No constipation. No melena or hematochezia.  All other review of systems is negative unless indicated above.    Vital Signs Last 24 Hrs  T(C): 35.9 (23 Sep 2021 07:54), Max: 37.2 (22 Sep 2021 20:29)  T(F): 96.6 (23 Sep 2021 07:54), Max: 99 (22 Sep 2021 20:29)  HR: 74 (23 Sep 2021 07:54) (69 - 118)  BP: 141/75 (23 Sep 2021 07:54) (132/75 - 153/106)  BP(mean): --  RR: 18 (23 Sep 2021 07:54) (18 - 20)  SpO2: 96% (22 Sep 2021 23:58) (96% - 98%)    PHYSICAL EXAM:    Constitutional: Alert and Oriented; Occasionally grimacing secondary to pain  Respiratory: Clear to auscultation in all fields bilaterally; No wheezes or rales  Cardiovascular:  Normal S1 and S2, RRR, no M/G/R  Gastrointestinal: Mildly distended abdomen with Tenderness to palpation present diffusely with deep and light palpation, (worse in periumbilical/suprapubic region); No guarding or rebound tenderness; No hepatosplenomegaly; No dullness to percussion; Normoactive bowel sounds in all 4 quadrants.   Back: No CVA tenderness; ROM wnl  Extremities: No peripheral edema, No cyanosis, No bilateral leg swelling  Psychiatric: Normal mood, normal affect  Neurologic: CN II-CN XII intact, Sensation intact  Skin: No rashes or bruises    LABS:                        11.9   7.08  )-----------( 304      ( 23 Sep 2021 07:17 )             36.4     09-23    140  |  102  |  10  ----------------------------<  116<H>  3.9   |  23  |  0.8    Ca    9.3      23 Sep 2021 07:17  Phos  5.3     09-23  Mg     1.7     09-23    TPro  7.2  /  Alb  4.6  /  TBili  0.3  /  DBili  x   /  AST  26  /  ALT  27  /  AlkPhos  73  09-23      LIVER FUNCTIONS - ( 23 Sep 2021 07:17 )  Alb: 4.6 g/dL / Pro: 7.2 g/dL / ALK PHOS: 73 U/L / ALT: 27 U/L / AST: 26 U/L / GGT: x             RADIOLOGY & ADDITIONAL STUDIES:    < from: CT Abdomen and Pelvis w/ IV Cont (09.17.21 @ 12:24) >    EXAM:  CT ABDOMEN AND PELVIS IC            PROCEDURE DATE:  09/17/2021            INTERPRETATION:  CLINICAL STATEMENT: Right lower quadrant abdominal pain.    TECHNIQUE: Contiguous axial CT images were obtained from the lower chest to the pubic symphysis following administration of 100cc Optiray 320 intravenous contrast.  Oral contrast was not administered.  Reformatted images in the coronal and sagittal planes were acquired.    COMPARISON CT: December 19, 2017.    FINDINGS:    LOWER CHEST: Mild bibasilar atelectasis.    HEPATOBILIARY: Diffuse hepatic steatosis. Status post cholecystectomy.    SPLEEN: Unremarkable.    PANCREAS: Unremarkable.    ADRENAL GLANDS: Unremarkable.    KIDNEYS: Unremarkable.      < from: CT Abdomen and Pelvis w/ IV Cont (09.22.21 @ 21:16) >  EXAM:  CT ABDOMEN AND PELVIS IC            PROCEDURE DATE:  09/22/2021            INTERPRETATION:  CLINICAL STATEMENT: Abdominal pain.    TECHNIQUE: Contiguous axial CT images were obtained from the lower chest to the pubic symphysis following administration of 95 cc Omnipaque 350 intravenous contrast.  Oral contrast was not administered.  Reformatted images in the coronal and sagittal planes were acquired.    COMPARISON CT: CT abdomen pelvis 12/19/2017.    OTHER STUDIES USED FOR CORRELATION: None.      FINDINGS:    LOWER CHEST: Moderate hiatal hernia.    HEPATOBILIARY: Hepatic steatosis. Post cholecystectomy with stable biliary ductal dilatation.    SPLEEN: Unremarkable.    PANCREAS: Unremarkable.    ADRENAL GLANDS: Unremarkable.    KIDNEYS: Unremarkable.    ABDOMINOPELVIC NODES: Unremarkable.    PELVIC ORGANS: Post hysterectomy. Pelvic phleboliths.    PERITONEUM/MESENTERY/BOWEL: Moderate hiatal hernia. Left colonic diverticulosis, with an inflamed sigmoid diverticulum visualized. No evidence of abscess or free air. Normal appendix.    BONES/SOFT TISSUES: Unremarkable.    OTHER:      IMPRESSION:    Acute sigmoid colonic diverticulitis.    No evidence of abscess.    < end of copied text >      ABDOMINOPELVIC NODES: Unremarkable.    PELVIC ORGANS: Mild thickening of the urinary bladder dome is likely reactive. Coarse adnexal calcifications.    PERITONEUM/MESENTERY/BOWEL: Sigmoid colonic diverticulosis, with moderate mural thickening involving a short segment of sigmoid colon, with associated pericolonic inflammatory changes, compatible with sigmoid diverticulitis. No focal drainable fluid collection. No large free intraperitoneal air. No evidence of bowel obstruction. Normal caliber appendix. Moderate hiatal hernia.    BONES/SOFT TISSUES: No acute osseous abnormality.    OTHER: Atherosclerotic vascular calcification.      IMPRESSION:    1. Sigmoid colonic diverticulosis, with moderate mural thickening involving a short segment of sigmoid colon, and associated pericolonicinflammatory changes, compatible with acute sigmoid diverticulitis;  mild thickening of the adjacent urinary bladder dome is likely reactive. Correlation with colonoscopy is suggested when acute episode resolves to evaluate for underlying neoplastic process.    2. Normal caliber appendix.    3. Diffuse hepatic steatosis.    < end of copied text >

## 2021-09-23 NOTE — PROGRESS NOTE ADULT - ASSESSMENT
Case of a 66 year old (ex smoker) female patient known to have Overactive Bladder, Hypothyroidism, DM II, GERD/ Hiatal Hernia, and history of left breast cancer with a history of recent presentation to the ED for abdominal pain, fever, nausea, vomiting, and diarrhea on 09/17, found to have acute diverticulitis, s/p discharge on PO ciprofloxacin 500mg BID and PO Flagyl 500mg four times daily from 09/17-09/26. Currently presenting to the ED on 09/22 for worsening abdominal pain, nausea, vomiting, and diarrhea, found to have evidence of persistent acute diverticulitis in the absence of abscess on imaging, to be admitted for IV antibiotics and further monitoring. Currently hemodynamically stable but in some distress due to pain.      # Acute Sigmoid Colonic Diverticulitis  # No Abscess  * Recent presentation to the ED for abdominal pain, fever, nausea, vomiting, and diarrhea on 09/17, found to have acute diverticulitis, s/p discharge on PO ciprofloxacin 500mg BID and PO Flagyl 500mg four times daily from 09/17-09/26  * Vital Signs /106 mmHg,  bpm, RR 20 bpm, T 36.6, SaO2 98% on RA  * Investigations in ED WBC 7.94, Hb 12.6, Plt 145, Na 139, K 4.9, AG 22, BUN 14, Cr 1.3, Ca 10.3, T bili 0.2, ALP 76, AST 43, ALT 32, Lipase 21, BHB <0.2,  LA 09/17 1.7  * CT AP IC 09/17 Sigmoid colonic diverticulosis, with moderate mural thickening involving a short segment of sigmoid colon, and associated pericolonicinflammatory changes, compatible with acute sigmoid diverticulitis;  mild thickening of the adjacent urinary bladder dome is likely reactive. Correlation with colonoscopy is suggested when acute episode resolves to evaluate for underlying neoplastic process. Normal caliber appendix. Diffuse hepatic steatosis.  * CT AP IC 09/22 Acute sigmoid colonic diverticulitis. No evidence of abscess.  * S/P IV Ciprofloxacin 400mg x1 dose and IV Metronidazole 500mg x1 dose in ED  * S/P IV Morphine 4mg x1 dose for the pain and IV Zofran 4mg x2 doses for the nausea  * S/P LR 1L x2 boluses in ED    - Follow up GI consult  - Monitor T for fever: afebrile since admission  - Trend WBC: no leukocytosis  - continue with IV cipro/flagyl for now; ID eval  - Supportive care - Pain control, Zofran PRN for nausea and vomiting  - IV hydration  - Follow up blood culture and stool culture, GI PCR  - NPO until pain controlled, advance as tolerated      # Acute Kidney Injury - resolved  - Likely prerenal from Diarrhea  - HAGMA - likely from [facility]IANO - Improved   - continue with IV hydration  - strict I/O  - Monitor BMP  - cr 0.8 today    # Hepatic Steatosis  * Noted on CT AP IC  * T desiree 0.2, ALP 76, AST 43, ALT 32, Lipase 21  - Trend CMP  - Alcohol cessation (occasional per patient)  - Encourage weight loss and low fat diet  - RUQ US will not affect management for now      # Overactive Bladder  * Home med Tolterodine 4mg QD but ran out of it recently  - Resume Tolterodine 4mg QD and prescribe on discharge      # Hypothyroidism  * No recent TSH  * Home med Levothyroxine 100mcg QD  - Check TSH  - Resume Levothyroxine 100mcg QD      # DM Type II  * No recent Hba1c  * Diet Controlled  - Check Hba1c  - Monitor POCT premeals and at bedtime  - Sliding Scale B      # Left Breast Cancer  * Currently in remission for the last 5 years  - Outpatient follow up as needed      # Hiatal Hernia and GERD  * Home med Omeprazole 40mg QD  - Start Protonix 40mg QD  - Weight loss with life style modifications      # History of Right Rotator Cuff Tear   # Leg Cramps  * History of Right Rotator Cuff Tear s/p no surgical intervention. Home med Endocet 10/325 as needed (prescribed by Dr in [place])  * Leg Cramps on Cyclobenzaprine 10mg BID-TID per patient  - Monitor pain and keep score at 01/10. Tylenol 650mg Q6h PRN for mild pain and IV Morphine 2mg Q4h PRN for severe pain. S/P IV Morphine 4mg x1 dose for the pain in ED.  - PT      Others  - DVT Prophylaxis: Heparin 5000 Q8h SC  - GI Prophylaxis: Pantoprazole 40mg PO QD  - Diet: High Fiber DASH TLC  - Code Status: Full      Barriers to learning: NO  Discharge Planning: Patient will be discharged once stable   Plan was communicated with patient and medical team      Alina Rhoades MD  PGY - 2 Internal Medicine   [facility]   Case of a 66 year old (ex smoker) female patient known to have Overactive Bladder, Hypothyroidism, DM II, GERD/ Hiatal Hernia, and history of left breast cancer with a history of recent presentation to the ED for abdominal pain, fever, nausea, vomiting, and diarrhea on 09/17, found to have acute diverticulitis, s/p discharge on PO ciprofloxacin 500mg BID and PO Flagyl 500mg four times daily from 09/17-09/26. Currently presenting to the ED on 09/22 for worsening abdominal pain, nausea, vomiting, and diarrhea, found to have evidence of persistent acute diverticulitis in the absence of abscess on imaging, to be admitted for IV antibiotics and further monitoring. Currently hemodynamically stable but in some distress due to pain.      # Acute Sigmoid Colonic Diverticulitis  # No Abscess  * Recent presentation to the ED for abdominal pain, fever, nausea, vomiting, and diarrhea on 09/17, found to have acute diverticulitis, s/p discharge on PO ciprofloxacin 500mg BID and PO Flagyl 500mg four times daily from 09/17-09/26  * Vital Signs /106 mmHg,  bpm, RR 20 bpm, T 36.6, SaO2 98% on RA  * Investigations in ED WBC 7.94, Hb 12.6, Plt 145, Na 139, K 4.9, AG 22, BUN 14, Cr 1.3, Ca 10.3, T bili 0.2, ALP 76, AST 43, ALT 32, Lipase 21, BHB <0.2,  LA 09/17 1.7  * CT AP IC 09/17 Sigmoid colonic diverticulosis, with moderate mural thickening involving a short segment of sigmoid colon, and associated pericolonicinflammatory changes, compatible with acute sigmoid diverticulitis;  mild thickening of the adjacent urinary bladder dome is likely reactive. Correlation with colonoscopy is suggested when acute episode resolves to evaluate for underlying neoplastic process. Normal caliber appendix. Diffuse hepatic steatosis.  * CT AP IC 09/22 Acute sigmoid colonic diverticulitis. No evidence of abscess.  * S/P IV Ciprofloxacin 400mg x1 dose and IV Metronidazole 500mg x1 dose in ED  * S/P IV Morphine 4mg x1 dose for the pain and IV Zofran 4mg x2 doses for the nausea  * S/P LR 1L x2 boluses in ED    - Follow up GI consult  - Monitor T for fever: afebrile since admission  - Trend WBC: no leukocytosis  - continue with IV cipro/flagyl for now; ID eval  - Supportive care - Pain control, Zofran PRN for nausea and vomiting  - IV hydration  - Follow up blood culture and stool culture, GI PCR  - NPO until pain controlled, advance as tolerated      # Acute Kidney Injury - resolved  - Likely prerenal from Diarrhea  - HAGMA - likely from [facility]IANO - Improved   - continue with IV hydration  - strict I/O  - Monitor BMP  - cr 0.8 today    # Hepatic Steatosis  * Noted on CT AP IC  * T desiree 0.2, ALP 76, AST 43, ALT 32, Lipase 21  - Trend CMP  - Alcohol cessation (occasional per patient)  - Encourage weight loss and low fat diet  - RUQ US will not affect management for now      # Overactive Bladder  * Home med Tolterodine 4mg QD but ran out of it recently  - Resume Tolterodine 4mg QD and prescribe on discharge      # Hypothyroidism  * No recent TSH  * Home med Levothyroxine 100mcg QD  - TSH - 12.6 - Repeat after resolution of acute illness  - Resume Levothyroxine 100mcg QD      # DM Type II  * No recent Hba1c  * Diet Controlled  - Check Hba1c  - Monitor POCT premeals and at bedtime  - Sliding Scale B      # Left Breast Cancer  * Currently in remission for the last 5 years  - Outpatient follow up as needed      # Hiatal Hernia and GERD  * Home med Omeprazole 40mg QD  - Start Protonix 40mg QD  - Weight loss with life style modifications      # History of Right Rotator Cuff Tear   # Leg Cramps  * History of Right Rotator Cuff Tear s/p no surgical intervention. Home med Endocet 10/325 as needed (prescribed by Dr in [place])  * Leg Cramps on Cyclobenzaprine 10mg BID-TID per patient  - Monitor pain and keep score at 01/10. Tylenol 650mg Q6h PRN for mild pain and IV Morphine 2mg Q4h PRN for severe pain. S/P IV Morphine 4mg x1 dose for the pain in ED.  - PT      Others  - DVT Prophylaxis: Heparin 5000 Q8h SC  - GI Prophylaxis: Pantoprazole 40mg PO QD  - Diet: High Fiber DASH TLC  - Code Status: Full

## 2021-09-23 NOTE — PROGRESS NOTE ADULT - ASSESSMENT
Case of a 66 year old (ex smoker) female patient known to have Overactive Bladder, Hypothyroidism, DM II, GERD/ Hiatal Hernia, and history of left breast cancer with a history of recent presentation to the ED for abdominal pain, fever, nausea, vomiting, and diarrhea on 09/17, found to have acute diverticulitis, s/p discharge on PO ciprofloxacin 500mg BID and PO Flagyl 500mg four times daily from 09/17-09/26. Currently presenting to the ED on 09/22 for worsening abdominal pain, nausea, vomiting, and diarrhea, found to have evidence of persistent acute diverticulitis in the absence of abscess on imaging, to be admitted for IV antibiotics and further monitoring. Currently hemodynamically stable but in some distress due to pain.      # Acute Sigmoid Colonic Diverticulitis  # No Abscess  * Recent presentation to the ED for abdominal pain, fever, nausea, vomiting, and diarrhea on 09/17, found to have acute diverticulitis, s/p discharge on PO ciprofloxacin 500mg BID and PO Flagyl 500mg four times daily from 09/17-09/26  * CT AP IC 09/17 Sigmoid colonic diverticulosis, with moderate mural thickening involving a short segment of sigmoid colon, and associated pericolonicinflammatory changes, compatible with acute sigmoid diverticulitis;  mild thickening of the adjacent urinary bladder dome is likely reactive. Correlation with colonoscopy is suggested when acute episode resolves to evaluate for underlying neoplastic process. Normal caliber appendix. Diffuse hepatic steatosis.    -  GI consult- appreciated  - Monitor T for fever: afebrile since admission  - Trend WBC: no leukocytosis  - continue with IV cipro/flagyl for now; ID eval  - Supportive care - Pain control, Zofran PRN for nausea and vomiting  - IV hydration  - Follow up blood culture and stool culture, GI PCR  - NPO until pain controlled, advance as tolerated      # Acute Kidney Injury - resolved  - Likely prerenal from Diarrhea  - HAGMA - likely from MARIANO - Improved   - continue with IV hydration  - strict I/O  - Monitor BMP  - cr 0.8 today    # Hepatic Steatosis  * Noted on CT AP IC  * T desiree 0.2, ALP 76, AST 43, ALT 32, Lipase 21  - Trend CMP  - Alcohol cessation (occasional per patient)  - Encourage weight loss and low fat diet  - RUQ US will not affect management for now      # Overactive Bladder  * Home med Tolterodine 4mg QD but ran out of it recently  - Resume Tolterodine 4mg QD and prescribe on discharge      # Hypothyroidism  * No recent TSH  * Home med Levothyroxine 100mcg QD  - TSH - 12.6 - Repeat after resolution of acute illness  - Resume Levothyroxine 100mcg QD      # DM Type II  * No recent Hba1c  * Diet Controlled  - Check Hba1c  - Monitor POCT premeals and at bedtime  - Sliding Scale B      # Left Breast Cancer  * Currently in remission for the last 5 years  - Outpatient follow up as needed      # Hiatal Hernia and GERD  * Home med Omeprazole 40mg QD  - Start Protonix 40mg QD  - Weight loss with life style modifications      # History of Right Rotator Cuff Tear   # Leg Cramps  * History of Right Rotator Cuff Tear s/p no surgical intervention. Home med Endocet 10/325 as needed (prescribed by Dr in [place])  * Leg Cramps on Cyclobenzaprine 10mg BID-TID per patient  - Monitor pain and keep score at 01/10. Tylenol 650mg Q6h PRN for mild pain and IV Morphine 2mg Q4h PRN for severe pain. S/P IV Morphine 4mg x1 dose for the pain in ED.  - PT      Others  - DVT Prophylaxis: Heparin 5000 Q8h SC  - GI Prophylaxis: Pantoprazole 40mg PO QD  - Diet: High Fiber DASH TLC  - Code Status: Full  - Status: acute with abd pain

## 2021-09-23 NOTE — CONSULT NOTE ADULT - ASSESSMENT
ASSESSMENT  Ms. Johnson is a 66 year old with PMH of DM II, Left Breast Cancer who presents with diverticulitis   - Recent presentation to the ED for abdominal pain, fever, nausea, vomiting, and diarrhea on 09/17, found to have acute diverticulitis, s/p discharge on PO ciprofloxacin 500mg BID and PO Flagyl 500mg four times daily from 09/17-09/26      IMPRESSION  #Diverticulitis  - CT Abdomen and Pelvis w/ IV Cont (09.17.21 @ 12:24): Sigmoid colonic diverticulosis, with moderate mural thickening involving a short segment of sigmoid colon, and associated pericolonicinflammatory changes, compatible with acute sigmoid diverticulitis;  mild thickening of the adjacent urinary bladder dome is likely reactive. Correlation with colonoscopy is suggested when acute episode resolves to evaluate for underlying neoplastic process.  - completed cipro/flagyl from 9/17-9/26 as outpatient  - CT Abdomen and Pelvis w/ IV Cont (09.22.21 @ 21:16): Acute sigmoid colonic diverticulitis. No evidence of abscess. Normal caliber appendix. Diffuse hepatic steatosis.    #Breast Cancer  #Obesity BMI (kg/m2): 28.9, 27.3  #Abx allergy: Bactrim (Anaphylaxis)  Keflex (Anaphylaxis)    RECOMMENDATIONS  This is a preliminary incomplete pended note, all final recommendations to follow after interview and examination of the patient.    Please call or message on [employer] Teams if with any questions.  Spectra [phone]     ASSESSMENT  Ms. Johnson is a 66 year old with PMH of DM II, Left Breast Cancer who presents with diverticulitis   - Recent presentation to the ED for abdominal pain, fever, nausea, vomiting, and diarrhea on 09/17, found to have acute diverticulitis, s/p discharge on PO ciprofloxacin 500mg BID and PO Flagyl 500mg four times daily from 09/17-09/26      IMPRESSION  #Diverticulitis  - CT Abdomen and Pelvis w/ IV Cont (09.17.21 @ 12:24): Sigmoid colonic diverticulosis, with moderate mural thickening involving a short segment of sigmoid colon, and associated pericolonicinflammatory changes, compatible with acute sigmoid diverticulitis;  mild thickening of the adjacent urinary bladder dome is likely reactive. Correlation with colonoscopy is suggested when acute episode resolves to evaluate for underlying neoplastic process.  - completed cipro/flagyl from 9/17-9/26 as outpatient  - CT Abdomen and Pelvis w/ IV Cont (09.22.21 @ 21:16): Acute sigmoid colonic diverticulitis. No evidence of abscess. Normal caliber appendix. Diffuse hepatic steatosis.    #Breast Cancer  #Obesity BMI (kg/m2): 28.9, 27.3  #Abx allergy: Bactrim (Anaphylaxis)  Keflex (Anaphylaxis)  -- reviewed allergies - she reports SJS -although unclear if this was from bactrim or Keflex as she was given both antibiotics at the same time     RECOMMENDATIONS  - possibly not improving if she was unable to tolerate PO medications due to nausea at home  - can trial IV cipro/flagyl while here   - advance diet per primary -- when able to tolerate diet, would trial PO cipro and flagyl -- if unable to do so, may need midline antibiotics     Please call or message on [employer] Teams if with any questions.  Spectra [phone]

## 2021-09-23 NOTE — ED ADULT NURSE REASSESSMENT NOTE - NS ED NURSE REASSESS COMMENT FT1
pt received from previous RN. Pt is sleeping in bed at this time safety and comfort measures maintained

## 2021-09-23 NOTE — CONSULT NOTE ADULT - ASSESSMENT
Assessment  66 year old female (ex smoker) with recent admission for acute diverticulitis (09/17) presents with progressive abdominal pain, nausea and vomiting. Initial labs showed no leukocytosis and the pain has been afebrile this admission. Repeat CT Abd/pelvis showed Acute Sigmoid Diverticulitis w/o abscess formation. She is currently on IV Cipro 400mg BID and Flagyl 500mg TID, NPO, LR @50cc/hr and is managed with Tylenol/Morphine for pain.       #Acute Sigmoid Diverticulitis without Abscess Formation    GI Recs:  -Continue IV Cipro 400mg BID and Flagyl 500mg TID for management of diverticulitis  -Afebrile, No leukocytosis; ESR-10; Pending CRP; Continue to monitor CBC; Follow up Blood and Stool Cx  -Watch for warning signs and symptoms of peritonitis, ileus or sepsis  -Consider Inpatient vs Outpatient Colonoscopy if patient remains hemodynamically stable; Patient states she has outpatient appt with GI scheduled on 09/27  -Keep NPO; Can advance to clear liquid diet when pain improves in nature and severity.   -Continue fluid rehydration with LR @50cc/hr; monitor UOP  -Maintain Analgesia with Tylenol/Morphine; Avoid NSAID use  -Continue Zofran for nausea  -Outpatient Endoscopy  -Protonix for GI prophylaxis Assessment  66 year old female (ex smoker) with recent admission for acute diverticulitis (09/17) presents with progressive abdominal pain, nausea and vomiting. Initial labs showed no leukocytosis and the pain has been afebrile this admission. Repeat CT Abd/pelvis showed Acute Sigmoid Diverticulitis w/o abscess formation. She is currently on IV Cipro 400mg BID and Flagyl 500mg TID, NPO, LR @50cc/hr and is managed with Tylenol/Morphine for pain.       #Acute Sigmoid Diverticulitis without Abscess Formation    GI Recs:  -Continue IV Cipro 400mg BID and Flagyl 500mg TID for management of diverticulitis; Consider changing ABX if patients symptoms progress or worsen in 48-72 hrs  -Afebrile, No leukocytosis; ESR-10; Pending CRP; Continue to monitor CBC; Follow up Blood and Stool Cx  -Watch for warning signs and symptoms of peritonitis, ileus or sepsis  -Keep NPO; Can advance to clear liquid diet when pain improves in nature and severity.   - Follow up with Colonoscopy once acute flare of diverticulitis resolves  -Appreciate surgical consult  -Continue fluid rehydration with LR @50cc/hr; monitor UOP  -Maintain Analgesia with Tylenol/Morphine; Avoid NSAID use  -Continue Zofran for nausea  -Protonix for GI prophylaxis

## 2021-09-23 NOTE — PHYSICAL THERAPY INITIAL EVALUATION ADULT - AMBULATION SKILLS, REHAB EVAL
However pt reports increased difficulty with mobility as of late due to generalized weakness/pain/independent

## 2021-09-23 NOTE — PHYSICAL THERAPY INITIAL EVALUATION ADULT - GAIT DISTANCE, PT EVAL
20 ft x 1; Pt declined further amb despite PT encouragement. Pt reports b/l knee pain, R shoulder pain, and lower back pain.

## 2021-09-23 NOTE — PHYSICAL THERAPY INITIAL EVALUATION ADULT - GAIT DEVIATIONS NOTED, PT EVAL
Step to gait with SC, very slow moving with decreased step length/height, apprehensive./decreased maryann/decreased step length/decreased stride length

## 2021-09-23 NOTE — PROGRESS NOTE ADULT - SUBJECTIVE AND OBJECTIVE BOX
** This is an incomplete note - pending AM rounds**   RODO LI 66y Female  MRN#: [id number]   CODE STATUS:    Hospital Day: 1d    Pt is currently admitted with the primary diagnosis of       SUBJECTIVE  Hospital Course  HPI:  History of Present Illness    Ms. Johnson is a 66 year old (ex smoker) female patient known to have:  - Overactive Bladder. Home med Tolterodine 4mg QD but ran out of it recently  - Hypothyroidism. No recent TSH. Home med Levothyroxine 100mcg QD  - DM Type II. No recent Hba1c. Diet Controlled  - Left Breast Cancer. Currently in remission for the last 5 years  - Hiatal Hernia and GERD. Home med Omeprazole 40mg QD  - History of Right Rotator Cuff Tear s/p no surgical intervention. Home med Endocet 10/325 as needed (prescribed by Dr in [place])  - Leg Cramps on Cyclobenzaprine 10mg BID-TID per patient  - S/P Hysterectomy  - Recent presentation to the ED for abdominal pain, fever, nausea, vomiting, and diarrhea on , found to have acute diverticulitis, s/p discharge on PO ciprofloxacin 500mg BID and PO Flagyl 500mg four times daily from -      She presented again to the ED on  for worsening symptoms.  History goes back to  when the patient started complaining of diffuse abdominal pain that was sharp in nature, of around 7/10 intensity, and radiating to the back.   It has been associated with T 101 on , nausea, reduced PO intake, non bilious non projectile non bloody vomiting (around 2-3 episodes per day for the last few days), and watery non bloody dark colored stools (multiple episodes per day despite being on Imodium).   She presented to the ED on  and was found to have acute diverticulitis.  She is s/p discharge on PO ciprofloxacin 500mg BID and PO Flagyl 500mg four times daily from -.  Since discharge, patient's diffuse abdominal pain has worsened.  She has not been able to keep anything in due to worsening nausea, non bilious non projectile non bloody vomiting (around 2-3 episodes per day for the last few days), and watery non bloody dark colored stools (multiple episodes per day despite being on Imodium).      On review of systems, patient denies any chills, night sweats, URTI symptoms (cough, rhinorrhea, sore throat), urinary symptoms (urinary frequency, urgency, intermittence, dysuria, foul smelling urine, cloudy urine), or headache.   No sick contacts.   No recent travel or exposure to recent travelers.      Upon presentation to the ED, the patient's Vital Signs were as follows:  - /106 mmHg  -  bpm  - RR 20 bpm  - T 36.6  - SaO2 98% on RA      Investigations in ED   - CBC  --> WBC 7.94, Hb 12.6, Plt 145    - Chemistry  --> Na 139, K 4.9, AG 22, BUN 14, Cr 1.3, Ca 10.3  --> T bili 0.2, ALP 76, AST 43, ALT 32, Lipase 21  --> BHB <0.2  --> LA  1.7    - Imaging  --> CT AP IC  Sigmoid colonic diverticulosis, with moderate mural thickening involving a short segment of sigmoid colon, and associated pericolonicinflammatory changes, compatible with acute sigmoid diverticulitis;  mild thickening of the adjacent urinary bladder dome is likely reactive. Correlation with colonoscopy is suggested when acute episode resolves to evaluate for underlying neoplastic process. Normal caliber appendix. Diffuse hepatic steatosis.  --> CT AP IC  Acute sigmoid colonic diverticulitis. No evidence of abscess.  --> CXR clear    - Microbiology  --> COVID received  --> Urinalysis  negative  --> Urine culture  contaminated      Patient was given IV Ciprofloxacin 400mg x1 dose and IV Metronidazole 500mg x1 dose in ED  She was also given IV Morphine 4mg x1 dose for the pain and IV Zofran 4mg x2 doses for the nausea  She is S/P LR 1L x2 boluses in ED  She will be admitted to the floor as a case of Acute Diverticulitis in the absence of abscess for management with IV antibiotics and for further investigations and monitoring.   (22 Sep 2021 23:09)      Overnight events/Subjective complaints  Patient was seen at the bedside this morning. She is lying comfortably on the bed. There were no acute events overnight. She still c/o diffuse lower abdominal pain. She is no longer having diarrhea.   She denies any chest pain, shortness of breath, cough, fever, dizziness or headache.                                                   ----------------------------------------------------------  OBJECTIVE  PAST MEDICAL & SURGICAL HISTORY  Breast CA    HTN (hypertension)    DM (diabetes mellitus)    Hypothyroid    Overactive bladder                                              -----------------------------------------------------------  ALLERGIES:  Bactrim (Anaphylaxis)  Keflex (Anaphylaxis)                                            ------------------------------------------------------------    HOME MEDICATIONS  Home Medications:  ammonium lactate 12% topical lotion:  (13 Sep 2018 08:07)  calcium carbonate 600 mg oral tablet, chewable: 2 tab(s) orally once a day (13 Sep 2018 08:07)  cyclobenzaprine 10 mg oral tablet:  (13 Sep 2018 08:07)  Endocet 10/325 oral tablet:  (13 Sep 2018 08:07)  levothyroxine 100 mcg (0.1 mg) oral tablet:  (13 Sep 2018 08:07)  lidocaine 5% topical film:  (13 Sep 2018 08:07)  lisinopril 20 mg oral tablet:  (13 Sep 2018 08:07)  omeprazole 20 mg oral delayed release capsule:  (13 Sep 2018 08:07)  tolterodine 4 mg oral capsule, extended release:  (13 Sep 2018 08:07)  zolpidem 10 mg oral tablet:  (13 Sep 2018 08:07)                           MEDICATIONS:  STANDING MEDICATIONS  calcium carbonate    500 mG (Tums) Chewable 2 Tablet(s) Chew daily  chlorhexidine 4% Liquid 1 Application(s) Topical <User Schedule>  ciprofloxacin   IVPB 400 milliGRAM(s) IV Intermittent every 12 hours  dextrose 40% Gel 15 Gram(s) Oral once  dextrose 5%. 1000 milliLiter(s) IV Continuous <Continuous>  dextrose 5%. 1000 milliLiter(s) IV Continuous <Continuous>  dextrose 50% Injectable 25 Gram(s) IV Push once  dextrose 50% Injectable 12.5 Gram(s) IV Push once  dextrose 50% Injectable 25 Gram(s) IV Push once  glucagon  Injectable 1 milliGRAM(s) IntraMuscular once  heparin SubCutaneous Injection - Peds 5000 Unit(s) SubCutaneous every 8 hours  influenza   Vaccine 0.5 milliLiter(s) IntraMuscular once  insulin lispro (ADMELOG) corrective regimen sliding scale   SubCutaneous three times a day before meals  lactated ringers. 1000 milliLiter(s) IV Continuous <Continuous>  levothyroxine  Oral Tab/Cap - Peds 100 MICROGram(s) Oral daily  metroNIDAZOLE  IVPB 500 milliGRAM(s) IV Intermittent every 8 hours  oxybutynin 5 milliGRAM(s) Oral two times a day  pantoprazole    Tablet 40 milliGRAM(s) Oral before breakfast    PRN MEDICATIONS  acetaminophen   Tablet .. 650 milliGRAM(s) Oral every 6 hours PRN  cyclobenzaprine 10 milliGRAM(s) Oral three times a day PRN  morphine  - Injectable 2 milliGRAM(s) IV Push every 4 hours PRN  ondansetron Injectable 4 milliGRAM(s) IV Push every 8 hours PRN  zolpidem 5 milliGRAM(s) Oral at bedtime PRN                                            ------------------------------------------------------------  VITAL SIGNS: Last 24 Hours  T(C): 35.9 (23 Sep 2021 07:54), Max: 37.2 (22 Sep 2021 20:29)  T(F): 96.6 (23 Sep 2021 07:54), Max: 99 (22 Sep 2021 20:29)  HR: 74 (23 Sep 2021 07:54) (69 - 118)  BP: 141/75 (23 Sep 2021 07:54) (132/75 - 153/106)  BP(mean): --  RR: 18 (23 Sep 2021 07:54) (18 - 20)  SpO2: 96% (22 Sep 2021 23:58) (96% - 98%)      21 @ 07:01  -  21 @ 12:18  --------------------------------------------------------  IN: 250 mL / OUT: 0 mL / NET: 250 mL        Daily Height in cm: 157.48 (22 Sep 2021 17:01), Height in cm: 157.48 (22 Sep 2021 17:01)    Daily                                         --------------------------------------------------------------  LABS:                        11.9   7.08  )-----------( 304      ( 23 Sep 2021 07:17 )             36.4                         12.6   7.94  )-----------( 298      ( 22 Sep 2021 19:13 )             39.6                         12.4   7.94  )-----------( 184      ( 17 Sep 2021 10:30 )             38.9      @ 07:17    140  |  102  |  10  ----------------------------<  116<H>  3.9   |  23  |  0.8   @ 19:13    139  |  99  |  14  ----------------------------<  114<H>  4.9   |  18  |  1.3   @ 10:30    137  |  101  |  11  ----------------------------<  115<H>  5.3<H>   |  22  |  0.8    Ca    9.3       @ 07:17  Ca    10.3<H>       @ 19:13  Ca    9.3       @ 10:30  Phos  5.3      @ 07:17  Mg     1.7         TPro  7.2  /  Alb  4.6  /  TBili  0.3  /  DBili  x   /  AST  26  /  ALT  27  /  AlkPhos  73    TPro  7.7  /  Alb  4.5  /  TBili  0.2  /  DBili  x   /  AST  43<H>  /  ALT  32  /  AlkPhos  76  09-  TPro  7.4  /  Alb  4.4  /  TBili  0.7  /  DBili  x   /  AST  29  /  ALT  20  /  AlkPhos  86  09-17      Urinalysis Basic - ( 23 Sep 2021 11:40 )    Color: Colorless / Appearance: Clear / S.008 / pH: x  Gluc: x / Ketone: Negative  / Bili: Negative / Urobili: <2 mg/dL   Blood: x / Protein: Negative / Nitrite: Negative   Leuk Esterase: Negative / RBC: x / WBC x   Sq Epi: x / Non Sq Epi: x / Bacteria: x        Lactate, Blood: 1.3 ( @ 07:17)        Culture - Urine (collected 17 Sep 2021 10:30)  Source: Clean Catch Clean Catch (Midstream)  Final Report (18 Sep 2021 16:41):    Culture grew 3 or more types of organisms which indicate    collection contamination; consider recollection only if clinically    indicated.                                                    -------------------------------------------------------------  RADIOLOGY:  < from: CT Abdomen and Pelvis w/ IV Cont (21 @ 21:16) >  IMPRESSION:    Acute sigmoid colonic diverticulitis.    No evidence of abscess.    < end of copied text >                                            --------------------------------------------------------------    PHYSICAL EXAM:  General: No acute distress; Pallor (-), Icterus (-), Cyanosis (-), Clubbing (-)  HEENT: Normocephalic, atraumatic, PERRLA, EOMI  PULM: Bilaterally equal and clear breath sounds, wheeze (-), rubs (-), crackles (-)  CVS: Normal S1 and S2, murmurs (-), rubs (-), gallops (-)   GI: Soft, nondistended, diffuse tenderness + on hypogastric and periumbilical region, BS +  MSK: Edema (-), no muscle, bone or joint tenderness noted  SKIN: Warm and well perfused, no rashes noted  NEURO:  Alert and Oriented x 3; No gross focal neurological deficit noted                                           -------------------------------------------------------------- RODO LI 66y Female  MRN#: [id number]   CODE STATUS:    Hospital Day: 1d    Pt is currently admitted with the primary diagnosis of Acute sigmoid diverticulitis      SUBJECTIVE  Hospital Course  HPI:  History of Present Illness    Ms. Johnson is a 66 year old (ex smoker) female patient known to have:  - Overactive Bladder. Home med Tolterodine 4mg QD but ran out of it recently  - Hypothyroidism. No recent TSH. Home med Levothyroxine 100mcg QD  - DM Type II. No recent Hba1c. Diet Controlled  - Left Breast Cancer. Currently in remission for the last 5 years  - Hiatal Hernia and GERD. Home med Omeprazole 40mg QD  - History of Right Rotator Cuff Tear s/p no surgical intervention. Home med Endocet 10/325 as needed (prescribed by Dr in [place])  - Leg Cramps on Cyclobenzaprine 10mg BID-TID per patient  - S/P Hysterectomy  - Recent presentation to the ED for abdominal pain, fever, nausea, vomiting, and diarrhea on , found to have acute diverticulitis, s/p discharge on PO ciprofloxacin 500mg BID and PO Flagyl 500mg four times daily from -      She presented again to the ED on  for worsening symptoms.  History goes back to  when the patient started complaining of diffuse abdominal pain that was sharp in nature, of around 7/10 intensity, and radiating to the back.   It has been associated with T 101 on , nausea, reduced PO intake, non bilious non projectile non bloody vomiting (around 2-3 episodes per day for the last few days), and watery non bloody dark colored stools (multiple episodes per day despite being on Imodium).   She presented to the ED on  and was found to have acute diverticulitis.  She is s/p discharge on PO ciprofloxacin 500mg BID and PO Flagyl 500mg four times daily from -.  Since discharge, patient's diffuse abdominal pain has worsened.  She has not been able to keep anything in due to worsening nausea, non bilious non projectile non bloody vomiting (around 2-3 episodes per day for the last few days), and watery non bloody dark colored stools (multiple episodes per day despite being on Imodium).      On review of systems, patient denies any chills, night sweats, URTI symptoms (cough, rhinorrhea, sore throat), urinary symptoms (urinary frequency, urgency, intermittence, dysuria, foul smelling urine, cloudy urine), or headache.   No sick contacts.   No recent travel or exposure to recent travelers.      Upon presentation to the ED, the patient's Vital Signs were as follows:  - /106 mmHg  -  bpm  - RR 20 bpm  - T 36.6  - SaO2 98% on RA      Investigations in ED   - CBC  --> WBC 7.94, Hb 12.6, Plt 145    - Chemistry  --> Na 139, K 4.9, AG 22, BUN 14, Cr 1.3, Ca 10.3  --> T bili 0.2, ALP 76, AST 43, ALT 32, Lipase 21  --> BHB <0.2  --> LA  1.7    - Imaging  --> CT AP IC  Sigmoid colonic diverticulosis, with moderate mural thickening involving a short segment of sigmoid colon, and associated pericolonicinflammatory changes, compatible with acute sigmoid diverticulitis;  mild thickening of the adjacent urinary bladder dome is likely reactive. Correlation with colonoscopy is suggested when acute episode resolves to evaluate for underlying neoplastic process. Normal caliber appendix. Diffuse hepatic steatosis.  --> CT AP IC  Acute sigmoid colonic diverticulitis. No evidence of abscess.  --> CXR clear    - Microbiology  --> COVID received  --> Urinalysis  negative  --> Urine culture  contaminated      Patient was given IV Ciprofloxacin 400mg x1 dose and IV Metronidazole 500mg x1 dose in ED  She was also given IV Morphine 4mg x1 dose for the pain and IV Zofran 4mg x2 doses for the nausea  She is S/P LR 1L x2 boluses in ED  She will be admitted to the floor as a case of Acute Diverticulitis in the absence of abscess for management with IV antibiotics and for further investigations and monitoring.   (22 Sep 2021 23:09)      Overnight events/Subjective complaints  Patient was seen at the bedside this morning. She is lying comfortably on the bed. There were no acute events overnight. She still c/o diffuse lower abdominal pain. She is no longer having diarrhea.   She denies any chest pain, shortness of breath, cough, fever, dizziness or headache.                                                   ----------------------------------------------------------  OBJECTIVE  PAST MEDICAL & SURGICAL HISTORY  Breast CA    HTN (hypertension)    DM (diabetes mellitus)    Hypothyroid    Overactive bladder                                              -----------------------------------------------------------  ALLERGIES:  Bactrim (Anaphylaxis)  Keflex (Anaphylaxis)                                            ------------------------------------------------------------    HOME MEDICATIONS  Home Medications:  ammonium lactate 12% topical lotion:  (13 Sep 2018 08:07)  calcium carbonate 600 mg oral tablet, chewable: 2 tab(s) orally once a day (13 Sep 2018 08:07)  cyclobenzaprine 10 mg oral tablet:  (13 Sep 2018 08:07)  Endocet 10/325 oral tablet:  (13 Sep 2018 08:07)  levothyroxine 100 mcg (0.1 mg) oral tablet:  (13 Sep 2018 08:07)  lidocaine 5% topical film:  (13 Sep 2018 08:07)  lisinopril 20 mg oral tablet:  (13 Sep 2018 08:07)  omeprazole 20 mg oral delayed release capsule:  (13 Sep 2018 08:07)  tolterodine 4 mg oral capsule, extended release:  (13 Sep 2018 08:07)  zolpidem 10 mg oral tablet:  (13 Sep 2018 08:07)                           MEDICATIONS:  STANDING MEDICATIONS  calcium carbonate    500 mG (Tums) Chewable 2 Tablet(s) Chew daily  chlorhexidine 4% Liquid 1 Application(s) Topical <User Schedule>  ciprofloxacin   IVPB 400 milliGRAM(s) IV Intermittent every 12 hours  dextrose 40% Gel 15 Gram(s) Oral once  dextrose 5%. 1000 milliLiter(s) IV Continuous <Continuous>  dextrose 5%. 1000 milliLiter(s) IV Continuous <Continuous>  dextrose 50% Injectable 25 Gram(s) IV Push once  dextrose 50% Injectable 12.5 Gram(s) IV Push once  dextrose 50% Injectable 25 Gram(s) IV Push once  glucagon  Injectable 1 milliGRAM(s) IntraMuscular once  heparin SubCutaneous Injection - Peds 5000 Unit(s) SubCutaneous every 8 hours  influenza   Vaccine 0.5 milliLiter(s) IntraMuscular once  insulin lispro (ADMELOG) corrective regimen sliding scale   SubCutaneous three times a day before meals  lactated ringers. 1000 milliLiter(s) IV Continuous <Continuous>  levothyroxine  Oral Tab/Cap - Peds 100 MICROGram(s) Oral daily  metroNIDAZOLE  IVPB 500 milliGRAM(s) IV Intermittent every 8 hours  oxybutynin 5 milliGRAM(s) Oral two times a day  pantoprazole    Tablet 40 milliGRAM(s) Oral before breakfast    PRN MEDICATIONS  acetaminophen   Tablet .. 650 milliGRAM(s) Oral every 6 hours PRN  cyclobenzaprine 10 milliGRAM(s) Oral three times a day PRN  morphine  - Injectable 2 milliGRAM(s) IV Push every 4 hours PRN  ondansetron Injectable 4 milliGRAM(s) IV Push every 8 hours PRN  zolpidem 5 milliGRAM(s) Oral at bedtime PRN                                            ------------------------------------------------------------  VITAL SIGNS: Last 24 Hours  T(C): 35.9 (23 Sep 2021 07:54), Max: 37.2 (22 Sep 2021 20:29)  T(F): 96.6 (23 Sep 2021 07:54), Max: 99 (22 Sep 2021 20:29)  HR: 74 (23 Sep 2021 07:54) (69 - 118)  BP: 141/75 (23 Sep 2021 07:54) (132/75 - 153/106)  BP(mean): --  RR: 18 (23 Sep 2021 07:54) (18 - 20)  SpO2: 96% (22 Sep 2021 23:58) (96% - 98%)      21 @ 07:01  -  21 @ 12:18  --------------------------------------------------------  IN: 250 mL / OUT: 0 mL / NET: 250 mL        Daily Height in cm: 157.48 (22 Sep 2021 17:01), Height in cm: 157.48 (22 Sep 2021 17:01)    Daily                                         --------------------------------------------------------------  LABS:                        11.9   7.08  )-----------( 304      ( 23 Sep 2021 07:17 )             36.4                         12.6   7.94  )-----------( 298      ( 22 Sep 2021 19:13 )             39.6                         12.4   7.94  )-----------( 184      ( 17 Sep 2021 10:30 )             38.9      @ 07:17    140  |  102  |  10  ----------------------------<  116<H>  3.9   |  23  |  0.8   @ 19:13    139  |  99  |  14  ----------------------------<  114<H>  4.9   |  18  |  1.3   @ 10:30    137  |  101  |  11  ----------------------------<  115<H>  5.3<H>   |  22  |  0.8    Ca    9.3       @ 07:17  Ca    10.3<H>       @ 19:13  Ca    9.3       @ 10:30  Phos  5.3      @ 07:17  Mg     1.7         TPro  7.2  /  Alb  4.6  /  TBili  0.3  /  DBili  x   /  AST  26  /  ALT  27  /  AlkPhos  73    TPro  7.7  /  Alb  4.5  /  TBili  0.2  /  DBili  x   /  AST  43<H>  /  ALT  32  /  AlkPhos  76    TPro  7.4  /  Alb  4.4  /  TBili  0.7  /  DBili  x   /  AST  29  /  ALT  20  /  AlkPhos  86        Urinalysis Basic - ( 23 Sep 2021 11:40 )    Color: Colorless / Appearance: Clear / S.008 / pH: x  Gluc: x / Ketone: Negative  / Bili: Negative / Urobili: <2 mg/dL   Blood: x / Protein: Negative / Nitrite: Negative   Leuk Esterase: Negative / RBC: x / WBC x   Sq Epi: x / Non Sq Epi: x / Bacteria: x        Lactate, Blood: 1.3 ( @ 07:17)        Culture - Urine (collected 17 Sep 2021 10:30)  Source: Clean Catch Clean Catch (Midstream)  Final Report (18 Sep 2021 16:41):    Culture grew 3 or more types of organisms which indicate    collection contamination; consider recollection only if clinically    indicated.                                                    -------------------------------------------------------------  RADIOLOGY:  < from: CT Abdomen and Pelvis w/ IV Cont (21 @ 21:16) >  IMPRESSION:    Acute sigmoid colonic diverticulitis.    No evidence of abscess.    < end of copied text >                                            --------------------------------------------------------------    PHYSICAL EXAM:  General: No acute distress; Pallor (-), Icterus (-), Cyanosis (-), Clubbing (-)  HEENT: Normocephalic, atraumatic, PERRLA, EOMI  PULM: Bilaterally equal and clear breath sounds, wheeze (-), rubs (-), crackles (-)  CVS: Normal S1 and S2, murmurs (-), rubs (-), gallops (-)   GI: Soft, nondistended, diffuse tenderness + on hypogastric and periumbilical region, BS +  MSK: Edema (-), no muscle, bone or joint tenderness noted  SKIN: Warm and well perfused, no rashes noted  NEURO:  Alert and Oriented x 3; No gross focal neurological deficit noted                                           --------------------------------------------------------------

## 2021-09-23 NOTE — PHYSICAL THERAPY INITIAL EVALUATION ADULT - GENERAL OBSERVATIONS, REHAB EVAL
1034 - 1233 Pt rec semifowler in bed with +IVF, +bed alarm, in NAD except c/o generalized pain, mostly in b/l knees, R shoulder (h/o RCT), and lower back pain (chronic). Pt agree to use SC for gait training, demonstrated by PT.

## 2021-09-23 NOTE — PROGRESS NOTE ADULT - SUBJECTIVE AND OBJECTIVE BOX
Patient is a 66y old  Female who presents with a chief complaint of Acute Diverticulitis (23 Sep 2021 16:48)      Patient seen and examined at bedside.  Patient reports continued abd pain, denies any nausea or vomiting today   ALLERGIES:  Bactrim (Anaphylaxis)  Keflex (Anaphylaxis)    MEDICATIONS:  acetaminophen   Tablet .. 650 milliGRAM(s) Oral every 6 hours PRN  calcium carbonate    500 mG (Tums) Chewable 2 Tablet(s) Chew daily  chlorhexidine 4% Liquid 1 Application(s) Topical <User Schedule>  ciprofloxacin   IVPB 400 milliGRAM(s) IV Intermittent every 12 hours  cyclobenzaprine 10 milliGRAM(s) Oral three times a day PRN  dextrose 40% Gel 15 Gram(s) Oral once  dextrose 5%. 1000 milliLiter(s) IV Continuous <Continuous>  dextrose 5%. 1000 milliLiter(s) IV Continuous <Continuous>  dextrose 50% Injectable 25 Gram(s) IV Push once  dextrose 50% Injectable 12.5 Gram(s) IV Push once  dextrose 50% Injectable 25 Gram(s) IV Push once  glucagon  Injectable 1 milliGRAM(s) IntraMuscular once  heparin SubCutaneous Injection - Peds 5000 Unit(s) SubCutaneous every 8 hours  influenza   Vaccine 0.5 milliLiter(s) IntraMuscular once  insulin lispro (ADMELOG) corrective regimen sliding scale   SubCutaneous three times a day before meals  lactated ringers. 1000 milliLiter(s) IV Continuous <Continuous>  levothyroxine  Oral Tab/Cap - Peds 100 MICROGram(s) Oral daily  metroNIDAZOLE  IVPB 500 milliGRAM(s) IV Intermittent every 8 hours  morphine  - Injectable 2 milliGRAM(s) IV Push every 4 hours PRN  ondansetron Injectable 4 milliGRAM(s) IV Push every 8 hours PRN  oxybutynin 5 milliGRAM(s) Oral two times a day  pantoprazole    Tablet 40 milliGRAM(s) Oral before breakfast  zolpidem 5 milliGRAM(s) Oral at bedtime PRN    Vital Signs Last 24 Hrs  T(F): 96.9 (23 Sep 2021 13:21), Max: 99 (22 Sep 2021 20:29)  HR: 65 (23 Sep 2021 13:21) (65 - 98)  BP: 112/60 (23 Sep 2021 13:21) (112/60 - 145/90)  RR: 18 (23 Sep 2021 13:21) (18 - 20)  SpO2: 96% (22 Sep 2021 23:58) (96% - 98%)  I&O's Summary    23 Sep 2021 07:01  -  23 Sep 2021 17:05  --------------------------------------------------------  IN: 500 mL / OUT: 0 mL / NET: 500 mL        PHYSICAL EXAM:  General: NAD, A/O x 3  ENT: MMM  Neck: Supple, No JVD  Lungs: Clear to auscultation bilaterally  Cardio: RRR, S1/S2, No murmurs  Abdomen: Soft,+tender, Nondistended; Bowel sounds present  Extremities: No cyanosis, No edema    LABS:                        11.9   7.08  )-----------( 304      ( 23 Sep 2021 07:17 )             36.4         140  |  102  |  10  ----------------------------<  116  3.9   |  23  |  0.8    Ca    9.3      23 Sep 2021 07:17  Phos  5.3       Mg     1.7         TPro  7.2  /  Alb  4.6  /  TBili  0.3  /  DBili  x   /  AST  26  /  ALT  27  /  AlkPhos  73      eGFR if Non African American: 78 mL/min/1.73M2 (21 @ 07:17)  eGFR if African American: 90 mL/min/1.73M2 (21 @ 07:17)      Lactate, Blood: 1.3 mmol/L ( @ 07:17)         Chol 81 mg/dL LDL -- HDL 37 mg/dL Trig 50 mg/dL  TSH 12.56   TSH with FT4 reflex --  Total T3 --              POCT Blood Glucose.: 95 mg/dL (23 Sep 2021 15:53)  POCT Blood Glucose.: 155 mg/dL (23 Sep 2021 11:02)  POCT Blood Glucose.: 123 mg/dL (23 Sep 2021 07:21)  POCT Blood Glucose.: 120 mg/dL (22 Sep 2021 23:57)  POCT Blood Glucose.: 116 mg/dL (22 Sep 2021 18:24)      Urinalysis Basic - ( 23 Sep 2021 11:40 )    Color: Colorless / Appearance: Clear / S.008 / pH: x  Gluc: x / Ketone: Negative  / Bili: Negative / Urobili: <2 mg/dL   Blood: x / Protein: Negative / Nitrite: Negative   Leuk Esterase: Negative / RBC: x / WBC x   Sq Epi: x / Non Sq Epi: x / Bacteria: x        Culture - Urine (collected 17 Sep 2021 10:30)  Source: Clean Catch Clean Catch (Midstream)  Final Report (18 Sep 2021 16:41):    Culture grew 3 or more types of organisms which indicate    collection contamination; consider recollection only if clinically    indicated.      COVID-19 PCR: NotDetec (21 @ 19:13)      RADIOLOGY & ADDITIONAL TESTS:  < from: CT Abdomen and Pelvis w/ IV Cont (21 @ 21:16) >  IMPRESSION:    Acute sigmoid colonic diverticulitis.    No evidence of abscess.    < end of copied text >    Care Discussed with Consultants/Other Providers:

## 2021-09-23 NOTE — CONSULT NOTE ADULT - SUBJECTIVE AND OBJECTIVE BOX
RODO LI  66y, Female  Allergy: Bactrim (Anaphylaxis)  Keflex (Anaphylaxis)      CHIEF COMPLAINT: Acute Diverticulitis (23 Sep 2021 12:17)      LOS  1d    HPI:  History of Present Illness    Ms. Johnson is a 66 year old (ex smoker) female patient known to have:  - Overactive Bladder. Home med Tolterodine 4mg QD but ran out of it recently  - Hypothyroidism. No recent TSH. Home med Levothyroxine 100mcg QD  - DM Type II. No recent Hba1c. Diet Controlled  - Left Breast Cancer. Currently in remission for the last 5 years  - Hiatal Hernia and GERD. Home med Omeprazole 40mg QD  - History of Right Rotator Cuff Tear s/p no surgical intervention. Home med Endocet 10/325 as needed (prescribed by Dr in [place])  - Leg Cramps on Cyclobenzaprine 10mg BID-TID per patient  - S/P Hysterectomy  - Recent presentation to the ED for abdominal pain, fever, nausea, vomiting, and diarrhea on , found to have acute diverticulitis, s/p discharge on PO ciprofloxacin 500mg BID and PO Flagyl 500mg four times daily from -      She presented again to the ED on  for worsening symptoms.  History goes back to  when the patient started complaining of diffuse abdominal pain that was sharp in nature, of around 7/10 intensity, and radiating to the back.   It has been associated with T 101 on , nausea, reduced PO intake, non bilious non projectile non bloody vomiting (around 2-3 episodes per day for the last few days), and watery non bloody dark colored stools (multiple episodes per day despite being on Imodium).   She presented to the ED on  and was found to have acute diverticulitis.  She is s/p discharge on PO ciprofloxacin 500mg BID and PO Flagyl 500mg four times daily from -.  Since discharge, patient's diffuse abdominal pain has worsened.  She has not been able to keep anything in due to worsening nausea, non bilious non projectile non bloody vomiting (around 2-3 episodes per day for the last few days), and watery non bloody dark colored stools (multiple episodes per day despite being on Imodium).      On review of systems, patient denies any chills, night sweats, URTI symptoms (cough, rhinorrhea, sore throat), urinary symptoms (urinary frequency, urgency, intermittence, dysuria, foul smelling urine, cloudy urine), or headache.   No sick contacts.   No recent travel or exposure to recent travelers.      Upon presentation to the ED, the patient's Vital Signs were as follows:  - /106 mmHg  -  bpm  - RR 20 bpm  - T 36.6  - SaO2 98% on RA      Investigations in ED   - CBC  --> WBC 7.94, Hb 12.6, Plt 145    - Chemistry  --> Na 139, K 4.9, AG 22, BUN 14, Cr 1.3, Ca 10.3  --> T bili 0.2, ALP 76, AST 43, ALT 32, Lipase 21  --> BHB <0.2  --> LA  1.7    - Imaging  --> CT AP IC  Sigmoid colonic diverticulosis, with moderate mural thickening involving a short segment of sigmoid colon, and associated pericolonicinflammatory changes, compatible with acute sigmoid diverticulitis;  mild thickening of the adjacent urinary bladder dome is likely reactive. Correlation with colonoscopy is suggested when acute episode resolves to evaluate for underlying neoplastic process. Normal caliber appendix. Diffuse hepatic steatosis.  --> CT AP IC  Acute sigmoid colonic diverticulitis. No evidence of abscess.  --> CXR clear    - Microbiology  --> COVID received  --> Urinalysis  negative  --> Urine culture  contaminated      Patient was given IV Ciprofloxacin 400mg x1 dose and IV Metronidazole 500mg x1 dose in ED  She was also given IV Morphine 4mg x1 dose for the pain and IV Zofran 4mg x2 doses for the nausea  She is S/P LR 1L x2 boluses in ED  She will be admitted to the floor as a case of Acute Diverticulitis in the absence of abscess for management with IV antibiotics and for further investigations and monitoring.   (22 Sep 2021 23:09)      INFECTIOUS DISEASE HISTORY:  History as above.     PAST MEDICAL & SURGICAL HISTORY:  Breast CA    HTN (hypertension)    DM (diabetes mellitus)    Hypothyroid    Overactive bladder        FAMILY HISTORY      SOCIAL HISTORY  Social History:  - Smoking History: ex smoker; Quit 4 months ago  - Alcohol Intake: occasional  - Substance Abuse: never per patient  - Living Conditions: from home (22 Sep 2021 23:09)        ROS  General: Denies rigors, nightsweats  HEENT: Denies headache, rhinorrhea, sore throat, eye pain  CV: Denies CP, palpitations  PULM: Denies wheezing, hemoptysis  GI: Denies hematemesis, hematochezia, melena  : Denies discharge, hematuria  MSK: Denies arthralgias, myalgias  SKIN: Denies rash, lesions  NEURO: Denies paresthesias, weakness  PSYCH: Denies depression, anxiety    VITALS:  T(F): 96.9, Max: 99 (21 @ 20:29)  HR: 65  BP: 112/60  RR: 18Vital Signs Last 24 Hrs  T(C): 36.1 (23 Sep 2021 13:21), Max: 37.2 (22 Sep 2021 20:29)  T(F): 96.9 (23 Sep 2021 13:21), Max: 99 (22 Sep 2021 20:29)  HR: 65 (23 Sep 2021 13:21) (65 - 118)  BP: 112/60 (23 Sep 2021 13:21) (112/60 - 153/106)  BP(mean): --  RR: 18 (23 Sep 2021 13:21) (18 - 20)  SpO2: 96% (22 Sep 2021 23:58) (96% - 98%)    PHYSICAL EXAM:  Gen: NAD, resting in bed  HEENT: Normocephalic, atraumatic  Neck: supple, no lymphadenopathy  CV: Regular rate & regular rhythm  Lungs: decreased BS at bases, no fremitus  Abdomen: Soft, BS present  Ext: Warm, well perfused  Neuro: non focal, awake  Skin: no rash, no erythema  Lines: no phlebitis    TESTS & MEASUREMENTS:                        11.9   7.08  )-----------( 304      ( 23 Sep 2021 07:17 )             36.4         140  |  102  |  10  ----------------------------<  116<H>  3.9   |  23  |  0.8    Ca    9.3      23 Sep 2021 07:17  Phos  5.3       Mg     1.7         TPro  7.2  /  Alb  4.6  /  TBili  0.3  /  DBili  x   /  AST  26  /  ALT  27  /  AlkPhos  73      eGFR if Non African American: 78 mL/min/1.73M2 (21 @ 07:17)  eGFR if African American: 90 mL/min/1.73M2 (21 @ 07:17)  eGFR if Non African American: 43 mL/min/1.73M2 (21 @ 19:13)  eGFR if African American: 50 mL/min/1.73M2 (21 @ 19:13)    LIVER FUNCTIONS - ( 23 Sep 2021 07:17 )  Alb: 4.6 g/dL / Pro: 7.2 g/dL / ALK PHOS: 73 U/L / ALT: 27 U/L / AST: 26 U/L / GGT: x           Urinalysis Basic - ( 23 Sep 2021 11:40 )    Color: Colorless / Appearance: Clear / S.008 / pH: x  Gluc: x / Ketone: Negative  / Bili: Negative / Urobili: <2 mg/dL   Blood: x / Protein: Negative / Nitrite: Negative   Leuk Esterase: Negative / RBC: x / WBC x   Sq Epi: x / Non Sq Epi: x / Bacteria: x        Culture - Urine (collected 21 @ 10:30)  Source: Clean Catch Clean Catch (Midstream)  Final Report (21 @ 16:41):    Culture grew 3 or more types of organisms which indicate    collection contamination; consider recollection only if clinically    indicated.        Lactate, Blood: 1.3 mmol/L (21 @ 07:17)      INFECTIOUS DISEASES TESTING  COVID-19 PCR: NotDetec (21 @ 19:13)      RADIOLOGY & ADDITIONAL TESTS:  I have personally reviewed the last Chest xray  CXR  Xray Chest 1 View- PORTABLE-Urgent:   EXAM:  XR CHEST PORTABLE URGENT 1V            PROCEDURE DATE:  2021            INTERPRETATION:  Clinical History / Reason for exam: Abdominal pain    Comparison : Chest radiograph 2017.    Technique/Positioning: Portable chest x-ray.    Findings:    Support devices: None.    Cardiac/mediastinum/hilum: Hiatal hernia.    Lung parenchyma/Pleura: Within normal limits.    Skeleton/soft tissues: Unremarkable.    Impression:    No radiographic evidence of acute cardiopulmonary disease.    Hiatal hernia.    --- End of Report ---              JASMINA YEE MD; Attending Radiologist  This document has been electronically signed. Sep 23 2021 10:52AM (21 @ 20:10)      CT  CT Abdomen and Pelvis w/ IV Cont:   EXAM:  CT ABDOMEN AND PELVIS IC            PROCEDURE DATE:  2021            INTERPRETATION:  CLINICAL STATEMENT: Abdominal pain.    TECHNIQUE: Contiguous axial CT images were obtained from the lower chest to the pubic symphysis following administration of 95 cc Omnipaque 350 intravenous contrast.  Oral contrast was not administered.  Reformatted images in the coronal and sagittal planes were acquired.    COMPARISON CT: CT abdomen pelvis 2017.    OTHER STUDIES USED FOR CORRELATION: None.      FINDINGS:    LOWER CHEST: Moderate hiatal hernia.    HEPATOBILIARY: Hepatic steatosis. Post cholecystectomy with stable biliary ductal dilatation.    SPLEEN: Unremarkable.    PANCREAS: Unremarkable.    ADRENAL GLANDS: Unremarkable.    KIDNEYS: Unremarkable.    ABDOMINOPELVIC NODES: Unremarkable.    PELVIC ORGANS: Post hysterectomy. Pelvic phleboliths.    PERITONEUM/MESENTERY/BOWEL: Moderate hiatal hernia. Left colonic diverticulosis, with an inflamed sigmoid diverticulum visualized. No evidence of abscess or free air. Normal appendix.    BONES/SOFT TISSUES: Unremarkable.    OTHER:      IMPRESSION:    Acute sigmoid colonic diverticulitis.    No evidence of abscess.    --- End of Report ---              ARVIN DUTTON MD; Attending Radiologist  This document has been electronically signed. Sep 22 2021  9:27PM (21 @ 21:16)      CARDIOLOGY TESTING  12 Lead ECG:   Ventricular Rate 77 BPM    Atrial Rate 77 BPM    P-R Interval 156 ms    QRS Duration 68 ms    Q-T Interval 376 ms    QTC Calculation(Bazett) 425 ms    P Axis 53 degrees    R Axis -3 degrees    T Axis 28 degrees    Diagnosis Line Normal sinus rhythmwith sinus arrhythmia  Poor R wave progression  Abnormal ECG    Confirmed by Pieter Campovered ([id number]) on 2021 8:00:11 PM (21 @ 18:59)      MEDICATIONS  calcium carbonate    500 mG (Tums) Chewable 2 Chew daily  chlorhexidine 4% Liquid 1 Topical <User Schedule>  ciprofloxacin   IVPB 400 IV Intermittent every 12 hours  dextrose 40% Gel 15 Oral once  dextrose 5%. 1000 IV Continuous <Continuous>  dextrose 5%. 1000 IV Continuous <Continuous>  dextrose 50% Injectable 25 IV Push once  dextrose 50% Injectable 12.5 IV Push once  dextrose 50% Injectable 25 IV Push once  glucagon  Injectable 1 IntraMuscular once  heparin SubCutaneous Injection - Peds 5000 SubCutaneous every 8 hours  influenza   Vaccine 0.5 IntraMuscular once  insulin lispro (ADMELOG) corrective regimen sliding scale  SubCutaneous three times a day before meals  lactated ringers. 1000 IV Continuous <Continuous>  levothyroxine  Oral Tab/Cap - Peds 100 Oral daily  metroNIDAZOLE  IVPB 500 IV Intermittent every 8 hours  oxybutynin 5 Oral two times a day  pantoprazole    Tablet 40 Oral before breakfast      Weight  Weight (kg): 71.7 (21 @ 17:01)    ANTIBIOTICS:  ciprofloxacin   IVPB 400 milliGRAM(s) IV Intermittent every 12 hours  metroNIDAZOLE  IVPB 500 milliGRAM(s) IV Intermittent every 8 hours      ALLERGIES:  Bactrim (Anaphylaxis)  Keflex (Anaphylaxis)       RODO LI  66y, Female  Allergy: Bactrim (Anaphylaxis)  Keflex (Anaphylaxis)      CHIEF COMPLAINT: Acute Diverticulitis (23 Sep 2021 12:17)      LOS  1d    HPI:  History of Present Illness    Ms. Johnsno is a 66 year old (ex smoker) female patient known to have:  - Overactive Bladder. Home med Tolterodine 4mg QD but ran out of it recently  - Hypothyroidism. No recent TSH. Home med Levothyroxine 100mcg QD  - DM Type II. No recent Hba1c. Diet Controlled  - Left Breast Cancer. Currently in remission for the last 5 years  - Hiatal Hernia and GERD. Home med Omeprazole 40mg QD  - History of Right Rotator Cuff Tear s/p no surgical intervention. Home med Endocet 10/325 as needed (prescribed by Dr in [place])  - Leg Cramps on Cyclobenzaprine 10mg BID-TID per patient  - S/P Hysterectomy  - Recent presentation to the ED for abdominal pain, fever, nausea, vomiting, and diarrhea on , found to have acute diverticulitis, s/p discharge on PO ciprofloxacin 500mg BID and PO Flagyl 500mg four times daily from -      She presented again to the ED on  for worsening symptoms.  History goes back to  when the patient started complaining of diffuse abdominal pain that was sharp in nature, of around 7/10 intensity, and radiating to the back.   It has been associated with T 101 on , nausea, reduced PO intake, non bilious non projectile non bloody vomiting (around 2-3 episodes per day for the last few days), and watery non bloody dark colored stools (multiple episodes per day despite being on Imodium).   She presented to the ED on  and was found to have acute diverticulitis.  She is s/p discharge on PO ciprofloxacin 500mg BID and PO Flagyl 500mg four times daily from -.  Since discharge, patient's diffuse abdominal pain has worsened.  She has not been able to keep anything in due to worsening nausea, non bilious non projectile non bloody vomiting (around 2-3 episodes per day for the last few days), and watery non bloody dark colored stools (multiple episodes per day despite being on Imodium).      On review of systems, patient denies any chills, night sweats, URTI symptoms (cough, rhinorrhea, sore throat), urinary symptoms (urinary frequency, urgency, intermittence, dysuria, foul smelling urine, cloudy urine), or headache.   No sick contacts.   No recent travel or exposure to recent travelers.      Upon presentation to the ED, the patient's Vital Signs were as follows:  - /106 mmHg  -  bpm  - RR 20 bpm  - T 36.6  - SaO2 98% on RA      Investigations in ED   - CBC  --> WBC 7.94, Hb 12.6, Plt 145    - Chemistry  --> Na 139, K 4.9, AG 22, BUN 14, Cr 1.3, Ca 10.3  --> T bili 0.2, ALP 76, AST 43, ALT 32, Lipase 21  --> BHB <0.2  --> LA  1.7    - Imaging  --> CT AP IC  Sigmoid colonic diverticulosis, with moderate mural thickening involving a short segment of sigmoid colon, and associated pericolonicinflammatory changes, compatible with acute sigmoid diverticulitis;  mild thickening of the adjacent urinary bladder dome is likely reactive. Correlation with colonoscopy is suggested when acute episode resolves to evaluate for underlying neoplastic process. Normal caliber appendix. Diffuse hepatic steatosis.  --> CT AP IC  Acute sigmoid colonic diverticulitis. No evidence of abscess.  --> CXR clear    - Microbiology  --> COVID received  --> Urinalysis  negative  --> Urine culture  contaminated      Patient was given IV Ciprofloxacin 400mg x1 dose and IV Metronidazole 500mg x1 dose in ED  She was also given IV Morphine 4mg x1 dose for the pain and IV Zofran 4mg x2 doses for the nausea  She is S/P LR 1L x2 boluses in ED  She will be admitted to the floor as a case of Acute Diverticulitis in the absence of abscess for management with IV antibiotics and for further investigations and monitoring.   (22 Sep 2021 23:09)      INFECTIOUS DISEASE HISTORY:  History as above.   Was sent home with cipro PO and flagyl 500 mg TID.  She reports that she tried to take antibiotics PO, but does not think she was actually tolerating antiboitics because of ongoing nausea/vomiting.     PAST MEDICAL & SURGICAL HISTORY:  Breast CA    HTN (hypertension)    DM (diabetes mellitus)    Hypothyroid    Overactive bladder        FAMILY HISTORY      SOCIAL HISTORY  Social History:  - Smoking History: ex smoker; Quit 4 months ago  - Alcohol Intake: occasional  - Substance Abuse: never per patient  - Living Conditions: from home (22 Sep 2021 23:09)        ROS  General: Denies rigors, nightsweats  HEENT: Denies headache, rhinorrhea, sore throat, eye pain  CV: Denies CP, palpitations  PULM: Denies wheezing, hemoptysis  GI: Denies hematemesis, hematochezia, melena  : Denies discharge, hematuria  MSK: Denies arthralgias, myalgias  SKIN: Denies rash, lesions  NEURO: Denies paresthesias, weakness  PSYCH: Denies depression, anxiety    VITALS:  T(F): 96.9, Max: 99 (21 @ 20:29)  HR: 65  BP: 112/60  RR: 18Vital Signs Last 24 Hrs  T(C): 36.1 (23 Sep 2021 13:21), Max: 37.2 (22 Sep 2021 20:29)  T(F): 96.9 (23 Sep 2021 13:21), Max: 99 (22 Sep 2021 20:29)  HR: 65 (23 Sep 2021 13:21) (65 - 118)  BP: 112/60 (23 Sep 2021 13:21) (112/60 - 153/106)  BP(mean): --  RR: 18 (23 Sep 2021 13:21) (18 - 20)  SpO2: 96% (22 Sep 2021 23:58) (96% - 98%)    PHYSICAL EXAM:  Gen: NAD, resting in bed  HEENT: Normocephalic, atraumatic  Neck: supple, no lymphadenopathy  CV: Regular rate & regular rhythm  Lungs: decreased BS at bases, no fremitus  Abdomen: Soft, BS present  Ext: Warm, well perfused  Neuro: non focal, awake  Skin: no rash, no erythema  Lines: no phlebitis    TESTS & MEASUREMENTS:                        11.9   7.08  )-----------( 304      ( 23 Sep 2021 07:17 )             36.4         140  |  102  |  10  ----------------------------<  116<H>  3.9   |  23  |  0.8    Ca    9.3      23 Sep 2021 07:17  Phos  5.3       Mg     1.7         TPro  7.2  /  Alb  4.6  /  TBili  0.3  /  DBili  x   /  AST  26  /  ALT  27  /  AlkPhos  73      eGFR if Non African American: 78 mL/min/1.73M2 (21 @ 07:17)  eGFR if African American: 90 mL/min/1.73M2 (21 @ 07:17)  eGFR if Non African American: 43 mL/min/1.73M2 (21 @ 19:13)  eGFR if African American: 50 mL/min/1.73M2 (21 @ 19:13)    LIVER FUNCTIONS - ( 23 Sep 2021 07:17 )  Alb: 4.6 g/dL / Pro: 7.2 g/dL / ALK PHOS: 73 U/L / ALT: 27 U/L / AST: 26 U/L / GGT: x           Urinalysis Basic - ( 23 Sep 2021 11:40 )    Color: Colorless / Appearance: Clear / S.008 / pH: x  Gluc: x / Ketone: Negative  / Bili: Negative / Urobili: <2 mg/dL   Blood: x / Protein: Negative / Nitrite: Negative   Leuk Esterase: Negative / RBC: x / WBC x   Sq Epi: x / Non Sq Epi: x / Bacteria: x        Culture - Urine (collected 21 @ 10:30)  Source: Clean Catch Clean Catch (Midstream)  Final Report (21 @ 16:41):    Culture grew 3 or more types of organisms which indicate    collection contamination; consider recollection only if clinically    indicated.        Lactate, Blood: 1.3 mmol/L (21 @ 07:17)      INFECTIOUS DISEASES TESTING  COVID-19 PCR: NotDetec (21 @ 19:13)      RADIOLOGY & ADDITIONAL TESTS:  I have personally reviewed the last Chest xray  CXR  Xray Chest 1 View- PORTABLE-Urgent:   EXAM:  XR CHEST PORTABLE URGENT 1V            PROCEDURE DATE:  2021            INTERPRETATION:  Clinical History / Reason for exam: Abdominal pain    Comparison : Chest radiograph 2017.    Technique/Positioning: Portable chest x-ray.    Findings:    Support devices: None.    Cardiac/mediastinum/hilum: Hiatal hernia.    Lung parenchyma/Pleura: Within normal limits.    Skeleton/soft tissues: Unremarkable.    Impression:    No radiographic evidence of acute cardiopulmonary disease.    Hiatal hernia.    --- End of Report ---              JASMINA YEE MD; Attending Radiologist  This document has been electronically signed. Sep 23 2021 10:52AM (21 @ 20:10)      CT  CT Abdomen and Pelvis w/ IV Cont:   EXAM:  CT ABDOMEN AND PELVIS IC            PROCEDURE DATE:  2021            INTERPRETATION:  CLINICAL STATEMENT: Abdominal pain.    TECHNIQUE: Contiguous axial CT images were obtained from the lower chest to the pubic symphysis following administration of 95 cc Omnipaque 350 intravenous contrast.  Oral contrast was not administered.  Reformatted images in the coronal and sagittal planes were acquired.    COMPARISON CT: CT abdomen pelvis 2017.    OTHER STUDIES USED FOR CORRELATION: None.      FINDINGS:    LOWER CHEST: Moderate hiatal hernia.    HEPATOBILIARY: Hepatic steatosis. Post cholecystectomy with stable biliary ductal dilatation.    SPLEEN: Unremarkable.    PANCREAS: Unremarkable.    ADRENAL GLANDS: Unremarkable.    KIDNEYS: Unremarkable.    ABDOMINOPELVIC NODES: Unremarkable.    PELVIC ORGANS: Post hysterectomy. Pelvic phleboliths.    PERITONEUM/MESENTERY/BOWEL: Moderate hiatal hernia. Left colonic diverticulosis, with an inflamed sigmoid diverticulum visualized. No evidence of abscess or free air. Normal appendix.    BONES/SOFT TISSUES: Unremarkable.    OTHER:      IMPRESSION:    Acute sigmoid colonic diverticulitis.    No evidence of abscess.    --- End of Report ---              ARVIN DUTTON MD; Attending Radiologist  This document has been electronically signed. Sep 22 2021  9:27PM (21 @ 21:16)      CARDIOLOGY TESTING  12 Lead ECG:   Ventricular Rate 77 BPM    Atrial Rate 77 BPM    P-R Interval 156 ms    QRS Duration 68 ms    Q-T Interval 376 ms    QTC Calculation(Bazett) 425 ms    P Axis 53 degrees    R Axis -3 degrees    T Axis 28 degrees    Diagnosis Line Normal sinus rhythmwith sinus arrhythmia  Poor R wave progression  Abnormal ECG    Confirmed by Pieter Campoverde ([id number]) on 2021 8:00:11 PM (21 @ 18:59)      MEDICATIONS  calcium carbonate    500 mG (Tums) Chewable 2 Chew daily  chlorhexidine 4% Liquid 1 Topical <User Schedule>  ciprofloxacin   IVPB 400 IV Intermittent every 12 hours  dextrose 40% Gel 15 Oral once  dextrose 5%. 1000 IV Continuous <Continuous>  dextrose 5%. 1000 IV Continuous <Continuous>  dextrose 50% Injectable 25 IV Push once  dextrose 50% Injectable 12.5 IV Push once  dextrose 50% Injectable 25 IV Push once  glucagon  Injectable 1 IntraMuscular once  heparin SubCutaneous Injection - Peds 5000 SubCutaneous every 8 hours  influenza   Vaccine 0.5 IntraMuscular once  insulin lispro (ADMELOG) corrective regimen sliding scale  SubCutaneous three times a day before meals  lactated ringers. 1000 IV Continuous <Continuous>  levothyroxine  Oral Tab/Cap - Peds 100 Oral daily  metroNIDAZOLE  IVPB 500 IV Intermittent every 8 hours  oxybutynin 5 Oral two times a day  pantoprazole    Tablet 40 Oral before breakfast      Weight  Weight (kg): 71.7 (21 @ 17:01)    ANTIBIOTICS:  ciprofloxacin   IVPB 400 milliGRAM(s) IV Intermittent every 12 hours  metroNIDAZOLE  IVPB 500 milliGRAM(s) IV Intermittent every 8 hours      ALLERGIES:  Bactrim (Anaphylaxis)  Keflex (Anaphylaxis)

## 2021-09-23 NOTE — PHYSICAL THERAPY INITIAL EVALUATION ADULT - PERTINENT HX OF CURRENT PROBLEM, REHAB EVAL
Case of a 66 year old (ex smoker) female patient known to have Overactive Bladder, Hypothyroidism, DM II, GERD/ Hiatal Hernia, and history of left breast cancer  presenting to the ED on 09/22 for worsening abdominal pain, nausea, vomiting, and diarrhea, found to have evidence of persistent acute diverticulitis

## 2021-09-24 LAB
ALBUMIN SERPL ELPH-MCNC: 3.9 G/DL — SIGNIFICANT CHANGE UP (ref 3.5–5.2)
ALP SERPL-CCNC: 62 U/L — SIGNIFICANT CHANGE UP (ref 30–115)
ALT FLD-CCNC: 26 U/L — SIGNIFICANT CHANGE UP (ref 0–41)
ANION GAP SERPL CALC-SCNC: 15 MMOL/L — HIGH (ref 7–14)
AST SERPL-CCNC: 30 U/L — SIGNIFICANT CHANGE UP (ref 0–41)
BASOPHILS # BLD AUTO: 0.04 K/UL — SIGNIFICANT CHANGE UP (ref 0–0.2)
BASOPHILS NFR BLD AUTO: 0.6 % — SIGNIFICANT CHANGE UP (ref 0–1)
BILIRUB SERPL-MCNC: 0.3 MG/DL — SIGNIFICANT CHANGE UP (ref 0.2–1.2)
BUN SERPL-MCNC: 9 MG/DL — LOW (ref 10–20)
CALCIUM SERPL-MCNC: 8.8 MG/DL — SIGNIFICANT CHANGE UP (ref 8.5–10.1)
CHLORIDE SERPL-SCNC: 102 MMOL/L — SIGNIFICANT CHANGE UP (ref 98–110)
CO2 SERPL-SCNC: 20 MMOL/L — SIGNIFICANT CHANGE UP (ref 17–32)
COVID-19 SPIKE DOMAIN AB INTERP: POSITIVE
COVID-19 SPIKE DOMAIN ANTIBODY RESULT: >250 U/ML — HIGH
CREAT SERPL-MCNC: 0.8 MG/DL — SIGNIFICANT CHANGE UP (ref 0.7–1.5)
EOSINOPHIL # BLD AUTO: 0.4 K/UL — SIGNIFICANT CHANGE UP (ref 0–0.7)
EOSINOPHIL NFR BLD AUTO: 6.1 % — SIGNIFICANT CHANGE UP (ref 0–8)
GLUCOSE BLDC GLUCOMTR-MCNC: 127 MG/DL — HIGH (ref 70–99)
GLUCOSE BLDC GLUCOMTR-MCNC: 74 MG/DL — SIGNIFICANT CHANGE UP (ref 70–99)
GLUCOSE BLDC GLUCOMTR-MCNC: 86 MG/DL — SIGNIFICANT CHANGE UP (ref 70–99)
GLUCOSE BLDC GLUCOMTR-MCNC: 91 MG/DL — SIGNIFICANT CHANGE UP (ref 70–99)
GLUCOSE SERPL-MCNC: 89 MG/DL — SIGNIFICANT CHANGE UP (ref 70–99)
HCT VFR BLD CALC: 34.9 % — LOW (ref 37–47)
HGB BLD-MCNC: 11 G/DL — LOW (ref 12–16)
IMM GRANULOCYTES NFR BLD AUTO: 0.6 % — HIGH (ref 0.1–0.3)
LYMPHOCYTES # BLD AUTO: 2.38 K/UL — SIGNIFICANT CHANGE UP (ref 1.2–3.4)
LYMPHOCYTES # BLD AUTO: 36.2 % — SIGNIFICANT CHANGE UP (ref 20.5–51.1)
MAGNESIUM SERPL-MCNC: 1.8 MG/DL — SIGNIFICANT CHANGE UP (ref 1.8–2.4)
MCHC RBC-ENTMCNC: 26.1 PG — LOW (ref 27–31)
MCHC RBC-ENTMCNC: 31.5 G/DL — LOW (ref 32–37)
MCV RBC AUTO: 82.7 FL — SIGNIFICANT CHANGE UP (ref 81–99)
MONOCYTES # BLD AUTO: 0.51 K/UL — SIGNIFICANT CHANGE UP (ref 0.1–0.6)
MONOCYTES NFR BLD AUTO: 7.8 % — SIGNIFICANT CHANGE UP (ref 1.7–9.3)
NEUTROPHILS # BLD AUTO: 3.2 K/UL — SIGNIFICANT CHANGE UP (ref 1.4–6.5)
NEUTROPHILS NFR BLD AUTO: 48.7 % — SIGNIFICANT CHANGE UP (ref 42.2–75.2)
NRBC # BLD: 0 /100 WBCS — SIGNIFICANT CHANGE UP (ref 0–0)
PLATELET # BLD AUTO: 254 K/UL — SIGNIFICANT CHANGE UP (ref 130–400)
POTASSIUM SERPL-MCNC: 4 MMOL/L — SIGNIFICANT CHANGE UP (ref 3.5–5)
POTASSIUM SERPL-SCNC: 4 MMOL/L — SIGNIFICANT CHANGE UP (ref 3.5–5)
PROT SERPL-MCNC: 6.2 G/DL — SIGNIFICANT CHANGE UP (ref 6–8)
RBC # BLD: 4.22 M/UL — SIGNIFICANT CHANGE UP (ref 4.2–5.4)
RBC # FLD: 14.6 % — HIGH (ref 11.5–14.5)
SARS-COV-2 IGG+IGM SERPL QL IA: >250 U/ML — HIGH
SARS-COV-2 IGG+IGM SERPL QL IA: POSITIVE
SODIUM SERPL-SCNC: 137 MMOL/L — SIGNIFICANT CHANGE UP (ref 135–146)
WBC # BLD: 6.57 K/UL — SIGNIFICANT CHANGE UP (ref 4.8–10.8)
WBC # FLD AUTO: 6.57 K/UL — SIGNIFICANT CHANGE UP (ref 4.8–10.8)

## 2021-09-24 PROCEDURE — 99232 SBSQ HOSP IP/OBS MODERATE 35: CPT

## 2021-09-24 RX ADMIN — Medication 100 MILLIGRAM(S): at 22:50

## 2021-09-24 RX ADMIN — Medication 2 TABLET(S): at 11:04

## 2021-09-24 RX ADMIN — Medication 100 MILLIGRAM(S): at 13:31

## 2021-09-24 RX ADMIN — CHLORHEXIDINE GLUCONATE 1 APPLICATION(S): 213 SOLUTION TOPICAL at 05:16

## 2021-09-24 RX ADMIN — CYCLOBENZAPRINE HYDROCHLORIDE 10 MILLIGRAM(S): 10 TABLET, FILM COATED ORAL at 19:45

## 2021-09-24 RX ADMIN — Medication 100 MICROGRAM(S): at 05:15

## 2021-09-24 RX ADMIN — Medication 5 MILLIGRAM(S): at 17:10

## 2021-09-24 RX ADMIN — PANTOPRAZOLE SODIUM 40 MILLIGRAM(S): 20 TABLET, DELAYED RELEASE ORAL at 06:46

## 2021-09-24 RX ADMIN — Medication 100 MILLIGRAM(S): at 05:16

## 2021-09-24 RX ADMIN — ZOLPIDEM TARTRATE 5 MILLIGRAM(S): 10 TABLET ORAL at 22:02

## 2021-09-24 RX ADMIN — Medication 200 MILLIGRAM(S): at 05:16

## 2021-09-24 RX ADMIN — MORPHINE SULFATE 2 MILLIGRAM(S): 50 CAPSULE, EXTENDED RELEASE ORAL at 07:55

## 2021-09-24 RX ADMIN — Medication 650 MILLIGRAM(S): at 18:26

## 2021-09-24 RX ADMIN — HEPARIN SODIUM 5000 UNIT(S): 5000 INJECTION INTRAVENOUS; SUBCUTANEOUS at 22:02

## 2021-09-24 RX ADMIN — MORPHINE SULFATE 2 MILLIGRAM(S): 50 CAPSULE, EXTENDED RELEASE ORAL at 08:39

## 2021-09-24 RX ADMIN — Medication 200 MILLIGRAM(S): at 17:09

## 2021-09-24 RX ADMIN — Medication 650 MILLIGRAM(S): at 19:36

## 2021-09-24 RX ADMIN — Medication 5 MILLIGRAM(S): at 05:15

## 2021-09-24 RX ADMIN — HEPARIN SODIUM 5000 UNIT(S): 5000 INJECTION INTRAVENOUS; SUBCUTANEOUS at 13:22

## 2021-09-24 RX ADMIN — HEPARIN SODIUM 5000 UNIT(S): 5000 INJECTION INTRAVENOUS; SUBCUTANEOUS at 05:15

## 2021-09-24 NOTE — PROGRESS NOTE ADULT - SUBJECTIVE AND OBJECTIVE BOX
SUBJECTIVE  Patient is a 66y old Female who presents with a chief complaint of Acute Diverticulitis (23 Sep 2021 17:05)  Currently admitted to medicine with the primary diagnosis of Diverticulitis    Today is hospital day 2d, and this morning she is still experiencing abdominal pain but improved compared to before, no diarrhea, no nausea, no fever, no chills.       OBJECTIVE  PAST MEDICAL & SURGICAL HISTORY  Breast CA    HTN (hypertension)    DM (diabetes mellitus)    Hypothyroid    Overactive bladder      ALLERGIES:  Bactrim (Anaphylaxis)  Keflex (Anaphylaxis)    MEDICATIONS:  STANDING MEDICATIONS  calcium carbonate    500 mG (Tums) Chewable 2 Tablet(s) Chew daily  chlorhexidine 4% Liquid 1 Application(s) Topical <User Schedule>  ciprofloxacin   IVPB 400 milliGRAM(s) IV Intermittent every 12 hours  dextrose 40% Gel 15 Gram(s) Oral once  dextrose 5%. 1000 milliLiter(s) IV Continuous <Continuous>  dextrose 5%. 1000 milliLiter(s) IV Continuous <Continuous>  dextrose 50% Injectable 25 Gram(s) IV Push once  dextrose 50% Injectable 12.5 Gram(s) IV Push once  dextrose 50% Injectable 25 Gram(s) IV Push once  glucagon  Injectable 1 milliGRAM(s) IntraMuscular once  heparin SubCutaneous Injection - Peds 5000 Unit(s) SubCutaneous every 8 hours  influenza   Vaccine 0.5 milliLiter(s) IntraMuscular once  insulin lispro (ADMELOG) corrective regimen sliding scale   SubCutaneous three times a day before meals  lactated ringers. 1000 milliLiter(s) IV Continuous <Continuous>  levothyroxine  Oral Tab/Cap - Peds 100 MICROGram(s) Oral daily  metroNIDAZOLE  IVPB 500 milliGRAM(s) IV Intermittent every 8 hours  oxybutynin 5 milliGRAM(s) Oral two times a day  pantoprazole    Tablet 40 milliGRAM(s) Oral before breakfast    PRN MEDICATIONS  acetaminophen   Tablet .. 650 milliGRAM(s) Oral every 6 hours PRN  cyclobenzaprine 10 milliGRAM(s) Oral three times a day PRN  morphine  - Injectable 2 milliGRAM(s) IV Push every 4 hours PRN  ondansetron Injectable 4 milliGRAM(s) IV Push every 8 hours PRN  zolpidem 5 milliGRAM(s) Oral at bedtime PRN      VITAL SIGNS: Last 24 Hours  T(C): 35.8 (24 Sep 2021 05:39), Max: 36.1 (23 Sep 2021 13:21)  T(F): 96.5 (24 Sep 2021 05:39), Max: 96.9 (23 Sep 2021 13:21)  HR: 65 (24 Sep 2021 05:39) (65 - 82)  BP: 139/77 (24 Sep 2021 05:39) (112/60 - 139/77)  BP(mean): --  RR: 18 (24 Sep 2021 05:39) (18 - 19)  SpO2: 99% (23 Sep 2021 21:32) (99% - 99%)    LABS:                        11.0   6.57  )-----------( 254      ( 24 Sep 2021 05:54 )             34.9     09-24    137  |  102  |  9<L>  ----------------------------<  89  4.0   |  20  |  0.8    Ca    8.8      24 Sep 2021 05:54  Phos  5.3       Mg     1.8         TPro  6.2  /  Alb  3.9  /  TBili  0.3  /  DBili  x   /  AST  30  /  ALT  26  /  AlkPhos  62  09-      Urinalysis Basic - ( 23 Sep 2021 11:40 )    Color: Colorless / Appearance: Clear / S.008 / pH: x  Gluc: x / Ketone: Negative  / Bili: Negative / Urobili: <2 mg/dL   Blood: x / Protein: Negative / Nitrite: Negative   Leuk Esterase: Negative / RBC: x / WBC x   Sq Epi: x / Non Sq Epi: x / Bacteria: x        RADIOLOGY:      PHYSICAL EXAM:    GENERAL: NAD, well-developed, AAOx3  HEENT:  Atraumatic, Normocephalic. EOMI, PERRLA, conjunctiva and sclera clear, No JVD  PULMONARY: Clear to auscultation bilaterally; No wheeze  CARDIOVASCULAR: Regular rate and rhythm; No murmurs, rubs, or gallops  GASTROINTESTINAL: Soft, mild abdominal tenderness, Nondistended; Bowel sounds present  MUSCULOSKELETAL:  2+ Peripheral Pulses, No clubbing, cyanosis, or edema  NEUROLOGY: non-focal  SKIN: No rashes or lesions      ADMISSION SUMMARY  Case of a 66 year old (ex smoker) female patient known to have Overactive Bladder, Hypothyroidism, DM II, GERD/ Hiatal Hernia, and history of left breast cancer with a history of recent presentation to the ED for abdominal pain, fever, nausea, vomiting, and diarrhea on , found to have acute diverticulitis, s/p discharge on PO ciprofloxacin 500mg BID and PO Flagyl 500mg four times daily from -. Currently presenting to the ED on  for worsening abdominal pain, nausea, vomiting, and diarrhea, found to have evidence of persistent acute diverticulitis in the absence of abscess on imaging, to be admitted for IV antibiotics and further monitoring. Currently hemodynamically stable but in some distress due to pain.      # Acute Sigmoid Colonic Diverticulitis  # No Abscess  * Recent presentation to the ED for abdominal pain, fever, nausea, vomiting, and diarrhea on , found to have acute diverticulitis, s/p discharge on PO ciprofloxacin 500mg BID and PO Flagyl 500mg four times daily from -  * Vital Signs /106 mmHg,  bpm, RR 20 bpm, T 36.6, SaO2 98% on RA  * Investigations in ED WBC 7.94, Hb 12.6, Plt 145, Na 139, K 4.9, AG 22, BUN 14, Cr 1.3, Ca 10.3, T bili 0.2, ALP 76, AST 43, ALT 32, Lipase 21, BHB <0.2,  LA  1.7  * CT AP IC  Sigmoid colonic diverticulosis, with moderate mural thickening involving a short segment of sigmoid colon, and associated pericolonicinflammatory changes, compatible with acute sigmoid diverticulitis;  mild thickening of the adjacent urinary bladder dome is likely reactive. Correlation with colonoscopy is suggested when acute episode resolves to evaluate for underlying neoplastic process. Normal caliber appendix. Diffuse hepatic steatosis.  * CT AP IC  Acute sigmoid colonic diverticulitis. No evidence of abscess.  * S/P IV Ciprofloxacin 400mg x1 dose and IV Metronidazole 500mg x1 dose in ED  * S/P IV Morphine 4mg x1 dose for the pain and IV Zofran 4mg x2 doses for the nausea  * S/P LR 1L x2 boluses in ED    - Monitor T for fever: afebrile since admission  - Trend WBC: no leukocytosis  - continue with IV cipro/flagyl for now; ID eval: when able to tolerate, can try po cipro/flagyl, if not able place a midline for IV abx.  - Supportive care - Pain control, Zofran PRN for nausea and vomiting  - IV hydration  - Follow up blood culture and stool culture, GI PCR  - will advance diet to clear liquid today.       # Acute Kidney Injury - resolved  - Likely prerenal from Diarrhea  - HAGMA - likely from MARIANO - Improved   - continue with IV hydration  - strict I/O  - Monitor BMP  - Creatinine Trend: 0.8<--, 0.8<--, 1.3<--, 0.8<--    # Hepatic Steatosis  * Noted on CT AP IC  * T desiree 0.2, ALP 76, AST 43, ALT 32, Lipase 21  - Trend CMP  - Alcohol cessation (occasional per patient)  - Encourage weight loss and low fat diet  - RUQ US will not affect management for now      # Overactive Bladder  * Home med Tolterodine 4mg QD but ran out of it recently  - Resume Tolterodine 4mg QD and prescribe on discharge      # Hypothyroidism  * No recent TSH  * Home med Levothyroxine 100mcg QD  - TSH - 12.6 - Repeat after resolution of acute illness  - Resume Levothyroxine 100mcg QD      # DM Type II  * No recent Hba1c  * Diet Controlled  - Check Hba1c  - Monitor POCT premeals and at bedtime  - Sliding Scale B      # Left Breast Cancer  * Currently in remission for the last 5 years  - Outpatient follow up as needed      # Hiatal Hernia and GERD  * Home med Omeprazole 40mg QD  - Start Protonix 40mg QD  - Weight loss with life style modifications      # History of Right Rotator Cuff Tear   # Leg Cramps  * History of Right Rotator Cuff Tear s/p no surgical intervention. Home med Endocet 10/325 as needed (prescribed by Dr in [place])  * Leg Cramps on Cyclobenzaprine 10mg BID-TID per patient  - Monitor pain and keep score at 01/10. Tylenol 650mg Q6h PRN for mild pain and IV Morphine 2mg Q4h PRN for severe pain. S/P IV Morphine 4mg x1 dose for the pain in ED.  - PT      Others  - DVT Prophylaxis: Heparin 5000 Q8h SC  - GI Prophylaxis: Pantoprazole 40mg PO QD  - Diet: High Fiber DASH TLC  - Code Status: Full

## 2021-09-24 NOTE — PROGRESS NOTE ADULT - ATTENDING COMMENTS
66 year old (ex smoker) female patient known to have Overactive Bladder, Hypothyroidism, DM II, GERD/ Hiatal Hernia, and history of left breast cancer with a history of recent presentation to the ED for abdominal pain, fever, nausea, vomiting, and diarrhea on 09/17, found to have acute diverticulitis, s/p discharge on PO ciprofloxacin 500mg BID and PO Flagyl 500mg four times daily from 09/17-09/26. Currently presenting to the ED on 09/22 for worsening abdominal pain, nausea, vomiting, and diarrhea, found to have evidence of persistent acute diverticulitis in the absence of abscess on imaging, to be admitted for IV antibiotics and further monitoring.     # Acute Sigmoid Colonic Diverticulitis  - Recent presentation to the ED for abdominal pain, fever, nausea, vomiting, and diarrhea on 09/17, found to have acute diverticulitis, s/p discharge on PO ciprofloxacin 500mg BID and PO Flagyl 500mg four times daily from 09/17-09/26  - CT AP IC 09/17 Sigmoid colonic diverticulosis, with moderate mural thickening involving a short segment of sigmoid colon, and associated pericolonic inflammatory changes, compatible with acute sigmoid diverticulitis  - CT AP IC 09/22 Acute sigmoid colonic diverticulitis. No evidence of abscess.  Plan:   - continue with IV cipro/flagyl for now; ID eval: when able to tolerate, can try po cipro/flagyl, if not able place a midline for IV abx.  - Supportive care - Pain control, Zofran PRN for nausea and vomiting  - IV hydration  - will advance diet to clear liquid today    # Acute Kidney Injury - resolved  - Likely prerenal from Diarrhea    # Hepatic Steatosis  - Noted on CT AP IC  - Alcohol cessation (occasional per patient)  - Encourage weight loss and low fat diet    # Overactive Bladder  - Resume Tolterodine 4mg QD and prescribe on discharge    # Hypothyroidism  - Resume Levothyroxine 100mcg QD    # DM Type II  - Diet Controlled  - Check Hba1c  - Monitor POCT premeals and at bedtime    # Left Breast Cancer  - Currently in remission for the last 5 years  - Outpatient follow up as needed    # Hiatal Hernia and GERD  - Start Protonix 40mg QD  - Weight loss with life style modifications      # Leg Cramps  # History of Right Rotator Cuff Tear s/p no surgical intervention.   - Home med Endocet 10/325 as needed (prescribed by Dr in [place])  -Leg Cramps on Cyclobenzaprine 10mg BID-TID per patient      - DVT Prophylaxis: heparin sq       #Progress Note Handoff:  Pending (specify):  dc tomorrow pending improvement in po intake  Family discussion: d/w pt at bedside   Disposition: Home___/SNF___/Other________/Unknown at this time___x_____      Daysi Villegas, DO

## 2021-09-24 NOTE — CONSULT NOTE ADULT - ASSESSMENT
Assessment  66 year old female (ex smoker) with recent admission for acute diverticulitis (09/17) presents with progressive abdominal pain, nausea and vomiting. Initial labs showed no leukocytosis and the pain has been afebrile this admission. Repeat CT Abd/pelvis showed Acute Sigmoid Diverticulitis w/o abscess formation. She is currently on IV Cipro 400mg BID and Flagyl 500mg TID, NPO, LR @50cc/hr and is managed with Tylenol/Morphine with improvement in pain since admission.       #Acute Sigmoid Diverticulitis without Abscess Formation    GI Recs:    -Continue current abx management of diverticulitis  -Wean off analgesia to ensure pain relief is organic  - Advance diet as tolerated  - Follow up Blood and Stool Cx   -Continue fluid rehydration with LR @50cc/hr; monitor UOP  - Follow up Outpatient Colonoscopy once acute flare of diverticulitis resolves  -Protonix for GI prophylaxis Assessment  66 year old female (ex smoker) with recent admission for acute diverticulitis (09/17) presents with progressive abdominal pain, nausea and vomiting. Initial labs showed no leukocytosis and the pain has been afebrile this admission. Repeat CT Abd/pelvis showed Acute Sigmoid Diverticulitis w/o abscess formation. She is currently on IV Cipro 400mg BID and Flagyl 500mg TID, NPO, LR @50cc/hr and is managed with Tylenol/Morphine with improvement in pain since admission.       #Acute Sigmoid Diverticulitis without Abscess Formation    GI Recs:    -Continue current IV abx management of diverticulitis; Trial of oral Cipro/Flagyl once patient able to tolerate PO intake  -Wean off analgesia to ensure pain relief is organic  - Advance diet as tolerated  - Follow up Blood and Stool Cx   -Continue fluid rehydration with LR @50cc/hr; monitor UOP  - Follow up Outpatient Colonoscopy once acute flare of diverticulitis resolves  -Protonix for GI prophylaxis

## 2021-09-24 NOTE — PROGRESS NOTE ADULT - ASSESSMENT
ASSESSMENT  Ms. Johnson is a 66 year old with PMH of DM II, Left Breast Cancer who presents with diverticulitis   - Recent presentation to the ED for abdominal pain, fever, nausea, vomiting, and diarrhea on 09/17, found to have acute diverticulitis, s/p discharge on PO ciprofloxacin 500mg BID and PO Flagyl 500mg four times daily from 09/17-09/26      IMPRESSION  #Diverticulitis  - CT Abdomen and Pelvis w/ IV Cont (09.17.21 @ 12:24): Sigmoid colonic diverticulosis, with moderate mural thickening involving a short segment of sigmoid colon, and associated pericolonicinflammatory changes, compatible with acute sigmoid diverticulitis;  mild thickening of the adjacent urinary bladder dome is likely reactive. Correlation with colonoscopy is suggested when acute episode resolves to evaluate for underlying neoplastic process.  - completed cipro/flagyl from 9/17-9/26 as outpatient  - CT Abdomen and Pelvis w/ IV Cont (09.22.21 @ 21:16): Acute sigmoid colonic diverticulitis. No evidence of abscess. Normal caliber appendix. Diffuse hepatic steatosis.    #Breast Cancer  #Obesity BMI (kg/m2): 28.9, 27.3  #Abx allergy: Bactrim (Anaphylaxis)  Keflex (Anaphylaxis)  -- reviewed allergies - she reports SJS -although unclear if this was from bactrim or Keflex as she was given both antibiotics at the same time     RECOMMENDATIONS  - pain seems to be improving - advance diet per primary   - continue IV cipro/flagyl  - when able to tolerate diet, would trial PO cipro 500 mg BID and flagyl 500 mg TID    Please call or message on [employer] Teams if with any questions.  Spectra [phone]

## 2021-09-24 NOTE — PROGRESS NOTE ADULT - SUBJECTIVE AND OBJECTIVE BOX
RODO LI  66y, Female  Allergy: Bactrim (Anaphylaxis)  Keflex (Anaphylaxis)      LOS  2d    CHIEF COMPLAINT: Acute Diverticulitis (24 Sep 2021 11:48)      INTERVAL EVENTS/HPI  - No acute events overnight  - T(F): , Max: 97.1 (21 @ 12:47)  - Denies any worsening symptoms  - Tolerating medication  - WBC Count: 6.57 (21 @ 05:54)  WBC Count: 7.08 (21 @ 07:17)     - Creatinine, Serum: 0.8 (21 @ 05:54)  Creatinine, Serum: 0.8 (21 @ 07:17)       ROS  General: Denies rigors, nightsweats  HEENT: Denies headache, rhinorrhea, sore throat, eye pain  CV: Denies CP, palpitations  PULM: Denies wheezing, hemoptysis  GI: Denies hematemesis, hematochezia, melena  : Denies discharge, hematuria  MSK: Denies arthralgias, myalgias  SKIN: Denies rash, lesions  NEURO: Denies paresthesias, weakness  PSYCH: Denies depression, anxiety    VITALS:  T(F): 97.1, Max: 97.1 (21 @ 12:47)  HR: 65  BP: 140/79  RR: 18Vital Signs Last 24 Hrs  T(C): 36.2 (24 Sep 2021 12:47), Max: 36.2 (24 Sep 2021 12:47)  T(F): 97.1 (24 Sep 2021 12:47), Max: 97.1 (24 Sep 2021 12:47)  HR: 65 (24 Sep 2021 12:47) (65 - 82)  BP: 140/79 (24 Sep 2021 12:47) (128/62 - 140/79)  BP(mean): --  RR: 18 (24 Sep 2021 12:47) (18 - 19)  SpO2: 99% (23 Sep 2021 21:32) (99% - 99%)    PHYSICAL EXAM:  Gen: NAD, resting in bed  HEENT: Normocephalic, atraumatic  Neck: supple, no lymphadenopathy  CV: Regular rate & regular rhythm  Lungs: decreased BS at bases, no fremitus  Abdomen: Soft, BS present  Ext: Warm, well perfused  Neuro: non focal, awake  Skin: no rash, no erythema  Lines: no phlebitis    FH: Non-contributory  Social Hx: Non-contributory    TESTS & MEASUREMENTS:                        11.0   6.57  )-----------( 254      ( 24 Sep 2021 05:54 )             34.9         137  |  102  |  9<L>  ----------------------------<  89  4.0   |  20  |  0.8    Ca    8.8      24 Sep 2021 05:54  Phos  5.3       Mg     1.8         TPro  6.2  /  Alb  3.9  /  TBili  0.3  /  DBili  x   /  AST  30  /  ALT  26  /  AlkPhos  62      eGFR if Non African American: 78 mL/min/1.73M2 (21 @ 05:54)  eGFR if African American: 90 mL/min/1.73M2 (21 @ 05:54)    LIVER FUNCTIONS - ( 24 Sep 2021 05:54 )  Alb: 3.9 g/dL / Pro: 6.2 g/dL / ALK PHOS: 62 U/L / ALT: 26 U/L / AST: 30 U/L / GGT: x           Urinalysis Basic - ( 23 Sep 2021 11:40 )    Color: Colorless / Appearance: Clear / S.008 / pH: x  Gluc: x / Ketone: Negative  / Bili: Negative / Urobili: <2 mg/dL   Blood: x / Protein: Negative / Nitrite: Negative   Leuk Esterase: Negative / RBC: x / WBC x   Sq Epi: x / Non Sq Epi: x / Bacteria: x        Culture - Blood (collected 21 @ 07:17)  Source: .Blood None  Preliminary Report (21 @ 14:02):    No growth to date.    Culture - Urine (collected 21 @ 10:30)  Source: Clean Catch Clean Catch (Midstream)  Final Report (21 @ 16:41):    Culture grew 3 or more types of organisms which indicate    collection contamination; consider recollection only if clinically    indicated.        Lactate, Blood: 1.3 mmol/L (21 @ 07:17)      INFECTIOUS DISEASES TESTING  COVID-19 PCR: NotDetec (21 @ 19:13)      INFLAMMATORY MARKERS  Sedimentation Rate, Erythrocyte: 10 mm/Hr (21 @ 07:17)  C-Reactive Protein, Serum: <3 mg/L (21 @ 07:17)      RADIOLOGY & ADDITIONAL TESTS:  I have personally reviewed the last available Chest xray  CXR  Xray Chest 1 View- PORTABLE-Urgent:   EXAM:  XR CHEST PORTABLE URGENT 1V            PROCEDURE DATE:  2021            INTERPRETATION:  Clinical History / Reason for exam: Abdominal pain    Comparison : Chest radiograph 2017.    Technique/Positioning: Portable chest x-ray.    Findings:    Support devices: None.    Cardiac/mediastinum/hilum: Hiatal hernia.    Lung parenchyma/Pleura: Within normal limits.    Skeleton/soft tissues: Unremarkable.    Impression:    No radiographic evidence of acute cardiopulmonary disease.    Hiatal hernia.    --- End of Report ---              JASMINA YEE MD; Attending Radiologist  This document has been electronically signed. Sep 23 2021 10:52AM (21 @ 20:10)      CT  CT Abdomen and Pelvis w/ IV Cont:   EXAM:  CT ABDOMEN AND PELVIS IC            PROCEDURE DATE:  2021            INTERPRETATION:  CLINICAL STATEMENT: Abdominal pain.    TECHNIQUE: Contiguous axial CT images were obtained from the lower chest to the pubic symphysis following administration of 95 cc Omnipaque 350 intravenous contrast.  Oral contrast was not administered.  Reformatted images in the coronal and sagittal planes were acquired.    COMPARISON CT: CT abdomen pelvis 2017.    OTHER STUDIES USED FOR CORRELATION: None.      FINDINGS:    LOWER CHEST: Moderate hiatal hernia.    HEPATOBILIARY: Hepatic steatosis. Post cholecystectomy with stable biliary ductal dilatation.    SPLEEN: Unremarkable.    PANCREAS: Unremarkable.    ADRENAL GLANDS: Unremarkable.    KIDNEYS: Unremarkable.    ABDOMINOPELVIC NODES: Unremarkable.    PELVIC ORGANS: Post hysterectomy. Pelvic phleboliths.    PERITONEUM/MESENTERY/BOWEL: Moderate hiatal hernia. Left colonic diverticulosis, with an inflamed sigmoid diverticulum visualized. No evidence of abscess or free air. Normal appendix.    BONES/SOFT TISSUES: Unremarkable.    OTHER:      IMPRESSION:    Acute sigmoid colonic diverticulitis.    No evidence of abscess.    --- End of Report ---              ARVIN DUTTON MD; Attending Radiologist  This document has been electronically signed. Sep 22 2021  9:27PM (21 @ 21:16)      CARDIOLOGY TESTING  12 Lead ECG:   Ventricular Rate 77 BPM    Atrial Rate 77 BPM    P-R Interval 156 ms    QRS Duration 68 ms    Q-T Interval 376 ms    QTC Calculation(Bazett) 425 ms    P Axis 53 degrees    R Axis -3 degrees    T Axis 28 degrees    Diagnosis Line Normal sinus rhythmwith sinus arrhythmia  Poor R wave progression  Abnormal ECG    Confirmed by Pieter Campoverde ([id number]) on 2021 8:00:11 PM (21 @ 18:59)      MEDICATIONS  calcium carbonate    500 mG (Tums) Chewable 2 Chew daily  chlorhexidine 4% Liquid 1 Topical <User Schedule>  ciprofloxacin   IVPB 400 IV Intermittent every 12 hours  dextrose 40% Gel 15 Oral once  dextrose 5%. 1000 IV Continuous <Continuous>  dextrose 5%. 1000 IV Continuous <Continuous>  dextrose 50% Injectable 25 IV Push once  dextrose 50% Injectable 12.5 IV Push once  dextrose 50% Injectable 25 IV Push once  glucagon  Injectable 1 IntraMuscular once  heparin SubCutaneous Injection - Peds 5000 SubCutaneous every 8 hours  influenza   Vaccine 0.5 IntraMuscular once  insulin lispro (ADMELOG) corrective regimen sliding scale  SubCutaneous three times a day before meals  lactated ringers. 1000 IV Continuous <Continuous>  levothyroxine  Oral Tab/Cap - Peds 100 Oral daily  metroNIDAZOLE  IVPB 500 IV Intermittent every 8 hours  oxybutynin 5 Oral two times a day  pantoprazole    Tablet 40 Oral before breakfast      WEIGHT  Weight (kg): 71.7 (21 @ 17:01)  Creatinine, Serum: 0.8 mg/dL (21 @ 05:54)      ANTIBIOTICS:  ciprofloxacin   IVPB 400 milliGRAM(s) IV Intermittent every 12 hours  metroNIDAZOLE  IVPB 500 milliGRAM(s) IV Intermittent every 8 hours      All available historical records have been reviewed

## 2021-09-24 NOTE — CONSULT NOTE ADULT - SUBJECTIVE AND OBJECTIVE BOX
Patient is a 66y old  Female who presents with a chief complaint of Acute Diverticulitis (24 Sep 2021 08:40)      HPI:  History of Present Illness    Ms. Elizabeth Bryson is a 66 year old woman (former smoker) with PMHx of GERD (managed by Omeprazole 40mg QD), Hiatal hernia, Cholecystectomy, hypothyroidism, LT breast ca (remission x 5 years), Type II DM and overactive bladder who presents with 1 week history of worsening sharp, diffuse abdominal pain radiating to the back, nausea, vomiting, and diarrhea. She presented to the ED on 09/17 for similar symptoms, CT Abd/Pelvis demonstrated acute diverticulitis and d/c'd on PO Cipro 500mg BID and Flagyl 500mg TID. She was readmitted on 09/22, labs showed no leukocytosis, lipase-21 and repeat CT Abd/Pelvis continued to show Acute sigmoid diverticulitis and was started on IV Cipro 400mg BID and Flagyl 500mg TID.     Patient seen and examined today at bedside. No overnight events noted. Patient states her abdominal pain has decreased in severity (5/10) and nausea has improved. Last BM-2 days ago. Overall appearance and disposition improved since admission. She denies fevers, chills, hematochezia, melena, chest pain and exposure to known sick contacts.     Upon presentation to the ED, the patient's Vital Signs were as follows:  - /106 mmHg  -  bpm  - RR 20 bpm  - T 36.6  - SaO2 98% on RA      Investigations in ED   - CBC  --> WBC 7.94, Hb 12.6, Plt 145    - Chemistry  --> Na 139, K 4.9, AG 22, BUN 14, Cr 1.3, Ca 10.3  --> T bili 0.2, ALP 76, AST 43, ALT 32, Lipase 21  --> BHB <0.2  --> LA 09/17 1.7    - Imaging  --> CT AP IC 09/17 Sigmoid colonic diverticulosis, with moderate mural thickening involving a short segment of sigmoid colon, and associated pericolonicinflammatory changes, compatible with acute sigmoid diverticulitis;  mild thickening of the adjacent urinary bladder dome is likely reactive. Correlation with colonoscopy is suggested when acute episode resolves to evaluate for underlying neoplastic process. Normal caliber appendix. Diffuse hepatic steatosis.  --> CT AP IC 09/22 Acute sigmoid colonic diverticulitis. No evidence of abscess.  --> CXR clear    - Microbiology  --> COVID received  --> Urinalysis 09/17 negative  --> Urine culture 09/17 contaminated      Patient was given IV Ciprofloxacin 400mg x1 dose and IV Metronidazole 500mg x1 dose in ED  She was also given IV Morphine 4mg x1 dose for the pain and IV Zofran 4mg x2 doses for the nausea  She is S/P LR 1L x2 boluses in ED  She will be admitted to the floor as a case of Acute Diverticulitis in the absence of abscess for management with IV antibiotics and for further investigations and monitoring.   (22 Sep 2021 23:09)      PAST MEDICAL & SURGICAL HISTORY:  Breast CA    HTN (hypertension)    DM (diabetes mellitus)    Hypothyroid    Overactive bladder        MEDICATIONS  (STANDING):  calcium carbonate    500 mG (Tums) Chewable 2 Tablet(s) Chew daily  chlorhexidine 4% Liquid 1 Application(s) Topical <User Schedule>  ciprofloxacin   IVPB 400 milliGRAM(s) IV Intermittent every 12 hours  dextrose 40% Gel 15 Gram(s) Oral once  dextrose 5%. 1000 milliLiter(s) (50 mL/Hr) IV Continuous <Continuous>  dextrose 5%. 1000 milliLiter(s) (100 mL/Hr) IV Continuous <Continuous>  dextrose 50% Injectable 25 Gram(s) IV Push once  dextrose 50% Injectable 12.5 Gram(s) IV Push once  dextrose 50% Injectable 25 Gram(s) IV Push once  glucagon  Injectable 1 milliGRAM(s) IntraMuscular once  heparin SubCutaneous Injection - Peds 5000 Unit(s) SubCutaneous every 8 hours  influenza   Vaccine 0.5 milliLiter(s) IntraMuscular once  insulin lispro (ADMELOG) corrective regimen sliding scale   SubCutaneous three times a day before meals  lactated ringers. 1000 milliLiter(s) (50 mL/Hr) IV Continuous <Continuous>  levothyroxine  Oral Tab/Cap - Peds 100 MICROGram(s) Oral daily  metroNIDAZOLE  IVPB 500 milliGRAM(s) IV Intermittent every 8 hours  oxybutynin 5 milliGRAM(s) Oral two times a day  pantoprazole    Tablet 40 milliGRAM(s) Oral before breakfast    MEDICATIONS  (PRN):  acetaminophen   Tablet .. 650 milliGRAM(s) Oral every 6 hours PRN Mild Pain (1 - 3)  cyclobenzaprine 10 milliGRAM(s) Oral three times a day PRN Muscle Spasm  morphine  - Injectable 2 milliGRAM(s) IV Push every 4 hours PRN Severe Pain (7 - 10)  ondansetron Injectable 4 milliGRAM(s) IV Push every 8 hours PRN Nausea and/or Vomiting  zolpidem 5 milliGRAM(s) Oral at bedtime PRN Insomnia      Allergies    Bactrim (Anaphylaxis)  Keflex (Anaphylaxis)    Intolerances        REVIEW OF SYSTEMS:    CONSTITUTIONAL: No weakness, fevers or chills  RESPIRATORY: No cough, wheezing, hemoptysis; No shortness of breath  CARDIOVASCULAR: No chest pain or palpitations  GASTROINTESTINAL: + Abdominal pain. No nausea, vomiting, or hematemesis; No  constipation. No melena or hematochezia.  All other review of systems is negative unless indicated above.    Vital Signs Last 24 Hrs  T(C): 35.8 (24 Sep 2021 05:39), Max: 36.1 (23 Sep 2021 13:21)  T(F): 96.5 (24 Sep 2021 05:39), Max: 96.9 (23 Sep 2021 13:21)  HR: 65 (24 Sep 2021 05:39) (65 - 82)  BP: 139/77 (24 Sep 2021 05:39) (112/60 - 139/77)  BP(mean): --  RR: 18 (24 Sep 2021 05:39) (18 - 19)  SpO2: 99% (23 Sep 2021 21:32) (99% - 99%)    PHYSICAL EXAM:    Constitutional: Pleasant appearing female in no apparent distress; interactive and cooperative  Respiratory: Clear to auscultation in all fields bilaterally; No wheezes or rales  Cardiovascular:  Normal S1 and S2, RRR, no M/G/R  Gastrointestinal: Mild abdominal tenderness to deep palpation in periumbilical region (largely improved since yesterday); No guarding or rebound tenderness; No hepatosplenomegaly; No dullness to percussion; Normoactive bowel sounds in all 4 quadrants.   Back: No CVA tenderness; ROM wnl  Extremities: No peripheral edema, No cyanosis, No bilateral leg swelling  Psychiatric: Normal mood, normal affect  Neurologic: CN II-CN XII intact, Sensation intact  Skin: No rashes or bruises    LABS:                        11.0   6.57  )-----------( 254      ( 24 Sep 2021 05:54 )             34.9     09-24    137  |  102  |  9<L>  ----------------------------<  89  4.0   |  20  |  0.8    Ca    8.8      24 Sep 2021 05:54  Phos  5.3     09-23  Mg     1.8     09-24    TPro  6.2  /  Alb  3.9  /  TBili  0.3  /  DBili  x   /  AST  30  /  ALT  26  /  AlkPhos  62  09-24      LIVER FUNCTIONS - ( 24 Sep 2021 05:54 )  Alb: 3.9 g/dL / Pro: 6.2 g/dL / ALK PHOS: 62 U/L / ALT: 26 U/L / AST: 30 U/L / GGT: x             RADIOLOGY & ADDITIONAL STUDIES:    < from: CT Abdomen and Pelvis w/ IV Cont (09.17.21 @ 12:24) >    EXAM:  CT ABDOMEN AND PELVIS IC            PROCEDURE DATE:  09/17/2021            INTERPRETATION:  CLINICAL STATEMENT: Right lower quadrant abdominal pain.    TECHNIQUE: Contiguous axial CT images were obtained from the lower chest to the pubic symphysis following administration of 100cc Optiray 320 intravenous contrast.  Oral contrast was not administered.  Reformatted images in the coronal and sagittal planes were acquired.    COMPARISON CT: December 19, 2017.    FINDINGS:    LOWER CHEST: Mild bibasilar atelectasis.    HEPATOBILIARY: Diffuse hepatic steatosis. Status post cholecystectomy.    SPLEEN: Unremarkable.    PANCREAS: Unremarkable.    ADRENAL GLANDS: Unremarkable.    KIDNEYS: Unremarkable.      < from: CT Abdomen and Pelvis w/ IV Cont (09.22.21 @ 21:16) >  EXAM:  CT ABDOMEN AND PELVIS IC            PROCEDURE DATE:  09/22/2021            INTERPRETATION:  CLINICAL STATEMENT: Abdominal pain.    TECHNIQUE: Contiguous axial CT images were obtained from the lower chest to the pubic symphysis following administration of 95 cc Omnipaque 350 intravenous contrast.  Oral contrast was not administered.  Reformatted images in the coronal and sagittal planes were acquired.    COMPARISON CT: CT abdomen pelvis 12/19/2017.    OTHER STUDIES USED FOR CORRELATION: None.      FINDINGS:    LOWER CHEST: Moderate hiatal hernia.    HEPATOBILIARY: Hepatic steatosis. Post cholecystectomy with stable biliary ductal dilatation.    SPLEEN: Unremarkable.    PANCREAS: Unremarkable.    ADRENAL GLANDS: Unremarkable.    KIDNEYS: Unremarkable.    ABDOMINOPELVIC NODES: Unremarkable.    PELVIC ORGANS: Post hysterectomy. Pelvic phleboliths.    PERITONEUM/MESENTERY/BOWEL: Moderate hiatal hernia. Left colonic diverticulosis, with an inflamed sigmoid diverticulum visualized. No evidence of abscess or free air. Normal appendix.    BONES/SOFT TISSUES: Unremarkable.    OTHER:      IMPRESSION:    Acute sigmoid colonic diverticulitis.    No evidence of abscess.    < end of copied text >      ABDOMINOPELVIC NODES: Unremarkable.    PELVIC ORGANS: Mild thickening of the urinary bladder dome is likely reactive. Coarse adnexal calcifications.    PERITONEUM/MESENTERY/BOWEL: Sigmoid colonic diverticulosis, with moderate mural thickening involving a short segment of sigmoid colon, with associated pericolonic inflammatory changes, compatible with sigmoid diverticulitis. No focal drainable fluid collection. No large free intraperitoneal air. No evidence of bowel obstruction. Normal caliber appendix. Moderate hiatal hernia.    BONES/SOFT TISSUES: No acute osseous abnormality.    OTHER: Atherosclerotic vascular calcification.      IMPRESSION:    1. Sigmoid colonic diverticulosis, with moderate mural thickening involving a short segment of sigmoid colon, and associated pericolonicinflammatory changes, compatible with acute sigmoid diverticulitis;  mild thickening of the adjacent urinary bladder dome is likely reactive. Correlation with colonoscopy is suggested when acute episode resolves to evaluate for underlying neoplastic process.    2. Normal caliber appendix.    3. Diffuse hepatic steatosis.    < end of copied text >

## 2021-09-25 ENCOUNTER — TRANSCRIPTION ENCOUNTER (OUTPATIENT)
Age: 66
End: 2021-09-25

## 2021-09-25 VITALS
HEART RATE: 107 BPM | DIASTOLIC BLOOD PRESSURE: 92 MMHG | TEMPERATURE: 97 F | RESPIRATION RATE: 18 BRPM | SYSTOLIC BLOOD PRESSURE: 150 MMHG

## 2021-09-25 LAB
ALBUMIN SERPL ELPH-MCNC: 4 G/DL — SIGNIFICANT CHANGE UP (ref 3.5–5.2)
ALP SERPL-CCNC: 63 U/L — SIGNIFICANT CHANGE UP (ref 30–115)
ALT FLD-CCNC: 26 U/L — SIGNIFICANT CHANGE UP (ref 0–41)
ANION GAP SERPL CALC-SCNC: 14 MMOL/L — SIGNIFICANT CHANGE UP (ref 7–14)
AST SERPL-CCNC: 28 U/L — SIGNIFICANT CHANGE UP (ref 0–41)
BASOPHILS # BLD AUTO: 0.04 K/UL — SIGNIFICANT CHANGE UP (ref 0–0.2)
BASOPHILS NFR BLD AUTO: 0.7 % — SIGNIFICANT CHANGE UP (ref 0–1)
BILIRUB SERPL-MCNC: 0.3 MG/DL — SIGNIFICANT CHANGE UP (ref 0.2–1.2)
BUN SERPL-MCNC: 7 MG/DL — LOW (ref 10–20)
CALCIUM SERPL-MCNC: 9 MG/DL — SIGNIFICANT CHANGE UP (ref 8.5–10.1)
CHLORIDE SERPL-SCNC: 103 MMOL/L — SIGNIFICANT CHANGE UP (ref 98–110)
CO2 SERPL-SCNC: 21 MMOL/L — SIGNIFICANT CHANGE UP (ref 17–32)
CREAT SERPL-MCNC: 0.7 MG/DL — SIGNIFICANT CHANGE UP (ref 0.7–1.5)
EOSINOPHIL # BLD AUTO: 0.38 K/UL — SIGNIFICANT CHANGE UP (ref 0–0.7)
EOSINOPHIL NFR BLD AUTO: 6.4 % — SIGNIFICANT CHANGE UP (ref 0–8)
GLUCOSE BLDC GLUCOMTR-MCNC: 120 MG/DL — HIGH (ref 70–99)
GLUCOSE BLDC GLUCOMTR-MCNC: 122 MG/DL — HIGH (ref 70–99)
GLUCOSE SERPL-MCNC: 118 MG/DL — HIGH (ref 70–99)
HCT VFR BLD CALC: 35.4 % — LOW (ref 37–47)
HGB BLD-MCNC: 11.3 G/DL — LOW (ref 12–16)
IMM GRANULOCYTES NFR BLD AUTO: 0.5 % — HIGH (ref 0.1–0.3)
LYMPHOCYTES # BLD AUTO: 1.73 K/UL — SIGNIFICANT CHANGE UP (ref 1.2–3.4)
LYMPHOCYTES # BLD AUTO: 29 % — SIGNIFICANT CHANGE UP (ref 20.5–51.1)
MAGNESIUM SERPL-MCNC: 1.8 MG/DL — SIGNIFICANT CHANGE UP (ref 1.8–2.4)
MCHC RBC-ENTMCNC: 25.8 PG — LOW (ref 27–31)
MCHC RBC-ENTMCNC: 31.9 G/DL — LOW (ref 32–37)
MCV RBC AUTO: 80.8 FL — LOW (ref 81–99)
MONOCYTES # BLD AUTO: 0.49 K/UL — SIGNIFICANT CHANGE UP (ref 0.1–0.6)
MONOCYTES NFR BLD AUTO: 8.2 % — SIGNIFICANT CHANGE UP (ref 1.7–9.3)
NEUTROPHILS # BLD AUTO: 3.3 K/UL — SIGNIFICANT CHANGE UP (ref 1.4–6.5)
NEUTROPHILS NFR BLD AUTO: 55.2 % — SIGNIFICANT CHANGE UP (ref 42.2–75.2)
NRBC # BLD: 0 /100 WBCS — SIGNIFICANT CHANGE UP (ref 0–0)
PLATELET # BLD AUTO: 248 K/UL — SIGNIFICANT CHANGE UP (ref 130–400)
POTASSIUM SERPL-MCNC: 3.8 MMOL/L — SIGNIFICANT CHANGE UP (ref 3.5–5)
POTASSIUM SERPL-SCNC: 3.8 MMOL/L — SIGNIFICANT CHANGE UP (ref 3.5–5)
PROT SERPL-MCNC: 6.2 G/DL — SIGNIFICANT CHANGE UP (ref 6–8)
RBC # BLD: 4.38 M/UL — SIGNIFICANT CHANGE UP (ref 4.2–5.4)
RBC # FLD: 14.3 % — SIGNIFICANT CHANGE UP (ref 11.5–14.5)
SODIUM SERPL-SCNC: 138 MMOL/L — SIGNIFICANT CHANGE UP (ref 135–146)
WBC # BLD: 5.97 K/UL — SIGNIFICANT CHANGE UP (ref 4.8–10.8)
WBC # FLD AUTO: 5.97 K/UL — SIGNIFICANT CHANGE UP (ref 4.8–10.8)

## 2021-09-25 PROCEDURE — 99239 HOSP IP/OBS DSCHRG MGMT >30: CPT

## 2021-09-25 RX ORDER — ZOLPIDEM TARTRATE 10 MG/1
2 TABLET ORAL
Qty: 28 | Refills: 0
Start: 2021-09-25 | End: 2021-10-08

## 2021-09-25 RX ORDER — ZOLPIDEM TARTRATE 10 MG/1
0 TABLET ORAL
Qty: 0 | Refills: 0 | DISCHARGE

## 2021-09-25 RX ORDER — METRONIDAZOLE 500 MG
500 TABLET ORAL ONCE
Refills: 0 | Status: COMPLETED | OUTPATIENT
Start: 2021-09-25 | End: 2021-09-25

## 2021-09-25 RX ORDER — CYCLOBENZAPRINE HYDROCHLORIDE 10 MG/1
0 TABLET, FILM COATED ORAL
Qty: 0 | Refills: 0 | DISCHARGE

## 2021-09-25 RX ORDER — CYCLOBENZAPRINE HYDROCHLORIDE 10 MG/1
1 TABLET, FILM COATED ORAL
Qty: 42 | Refills: 0
Start: 2021-09-25 | End: 2021-10-08

## 2021-09-25 RX ORDER — METRONIDAZOLE 500 MG
1 TABLET ORAL
Qty: 24 | Refills: 0
Start: 2021-09-25 | End: 2021-10-02

## 2021-09-25 RX ORDER — CIPROFLOXACIN LACTATE 400MG/40ML
1 VIAL (ML) INTRAVENOUS
Qty: 16 | Refills: 0
Start: 2021-09-25 | End: 2021-10-02

## 2021-09-25 RX ADMIN — PANTOPRAZOLE SODIUM 40 MILLIGRAM(S): 20 TABLET, DELAYED RELEASE ORAL at 06:14

## 2021-09-25 RX ADMIN — Medication 2 TABLET(S): at 12:17

## 2021-09-25 RX ADMIN — Medication 100 MICROGRAM(S): at 05:27

## 2021-09-25 RX ADMIN — CHLORHEXIDINE GLUCONATE 1 APPLICATION(S): 213 SOLUTION TOPICAL at 05:27

## 2021-09-25 RX ADMIN — Medication 5 MILLIGRAM(S): at 05:27

## 2021-09-25 RX ADMIN — Medication 500 MILLIGRAM(S): at 14:03

## 2021-09-25 RX ADMIN — HEPARIN SODIUM 5000 UNIT(S): 5000 INJECTION INTRAVENOUS; SUBCUTANEOUS at 05:26

## 2021-09-25 RX ADMIN — Medication 100 MILLIGRAM(S): at 05:26

## 2021-09-25 RX ADMIN — Medication 200 MILLIGRAM(S): at 05:27

## 2021-09-25 NOTE — DISCHARGE NOTE NURSING/CASE MANAGEMENT/SOCIAL WORK - NSDCPEFALRISK_GEN_ALL_CORE
For information on Fall & injury Prevention, visit [email or web address]

## 2021-09-25 NOTE — DISCHARGE NOTE NURSING/CASE MANAGEMENT/SOCIAL WORK - PATIENT PORTAL LINK FT
You can access the FollowMyHealth Patient Portal offered by [facility] by registering at the following website: [email or web address]. By joining [employer]’s FollowMyHealth portal, you will also be able to view your health information using other applications (apps) compatible with our system.

## 2021-09-25 NOTE — DISCHARGE NOTE PROVIDER - CARE PROVIDER_API CALL
Alonso Whipple  Gastroenterology  [address] [place] [id number]  Phone: [phone]  Fax: [phone]  Follow Up Time: 2 weeks

## 2021-09-25 NOTE — DISCHARGE NOTE PROVIDER - NSDCCPCAREPLAN_GEN_ALL_CORE_FT
PRINCIPAL DISCHARGE DIAGNOSIS  Diagnosis: Diverticulitis  Assessment and Plan of Treatment: - Please continue the prescribed Oral Antibiotics for an additional 8 days ( total 10 day course ).   - Follow up with PCP and outpatient GI doctor  - please take probiotics

## 2021-09-25 NOTE — DISCHARGE NOTE PROVIDER - NSDCMRMEDTOKEN_GEN_ALL_CORE_FT
ammonium lactate 12% topical lotion:   calcium carbonate 600 mg oral tablet, chewable: 2 tab(s) orally once a day  ciprofloxacin 500 mg oral tablet: 1 tab(s) orally 2 times a day   cyclobenzaprine 10 mg oral tablet: 1 tab(s) orally 3 times a day, As Needed MDD:30mg  Endocet 10/325 oral tablet:   levothyroxine 100 mcg (0.1 mg) oral tablet:   lidocaine 5% topical film:   lisinopril 20 mg oral tablet:   metroNIDAZOLE 500 mg oral tablet: 1 tab(s) orally every 8 hours   omeprazole 20 mg oral delayed release capsule:   tolterodine 4 mg oral capsule, extended release:   zolpidem 5 mg oral tablet: 2 tab(s) orally once a day (at bedtime), As Needed -Insomnia MDD:10mg

## 2021-09-25 NOTE — DISCHARGE NOTE PROVIDER - HOSPITAL COURSE
History of Present Illness    Ms. Johnson is a 66 year old (ex smoker) female patient known to have:  - Overactive Bladder. Home med Tolterodine 4mg QD but ran out of it recently  - Hypothyroidism. No recent TSH. Home med Levothyroxine 100mcg QD  - DM Type II. No recent Hba1c. Diet Controlled  - Left Breast Cancer. Currently in remission for the last 5 years  - Hiatal Hernia and GERD. Home med Omeprazole 40mg QD  - History of Right Rotator Cuff Tear s/p no surgical intervention. Home med Endocet 10/325 as needed (prescribed by Dr in [place])  - Leg Cramps on Cyclobenzaprine 10mg BID-TID per patient  - S/P Hysterectomy  - Recent presentation to the ED for abdominal pain, fever, nausea, vomiting, and diarrhea on 09/17, found to have acute diverticulitis, s/p discharge on PO ciprofloxacin 500mg BID and PO Flagyl 500mg four times daily from 09/17-09/26      She presented again to the ED on 09/22 for worsening symptoms.  History goes back to Thursday 09/16 when the patient started complaining of diffuse abdominal pain that was sharp in nature, of around 7/10 intensity, and radiating to the back.   It has been associated with T 101 on 09/16, nausea, reduced PO intake, non bilious non projectile non bloody vomiting (around 2-3 episodes per day for the last few days), and watery non bloody dark colored stools (multiple episodes per day despite being on Imodium).   She presented to the ED on 09/17 and was found to have acute diverticulitis.  She is s/p discharge on PO ciprofloxacin 500mg BID and PO Flagyl 500mg four times daily from 09/17-09/26.  Since discharge, patient's diffuse abdominal pain has worsened.  She has not been able to keep anything in due to worsening nausea, non bilious non projectile non bloody vomiting (around 2-3 episodes per day for the last few days), and watery non bloody dark colored stools (multiple episodes per day despite being on Imodium).      Hospital Course:     66 year old (ex smoker) female patient known to have Overactive Bladder, Hypothyroidism, DM II, GERD/ Hiatal Hernia, and history of left breast cancer with a history of recent presentation to the ED for abdominal pain, fever, nausea, vomiting, and diarrhea on 09/17, found to have acute diverticulitis, s/p discharge on PO ciprofloxacin 500mg BID and PO Flagyl 500mg four times daily from 09/17-09/26. Currently presenting to the ED on 09/22 for worsening abdominal pain, nausea, vomiting, and diarrhea, found to have evidence of persistent acute diverticulitis in the absence of abscess on imaging, to be admitted for IV antibiotics and further monitoring.     # Acute Sigmoid Colonic Diverticulitis  - Recent presentation to the ED for abdominal pain, fever, nausea, vomiting, and diarrhea on 09/17, found to have acute diverticulitis, s/p discharge on PO ciprofloxacin 500mg BID and PO Flagyl 500mg four times daily from 09/17-09/26  - CT AP IC 09/17 Sigmoid colonic diverticulosis, with moderate mural thickening involving a short segment of sigmoid colon, and associated pericolonic inflammatory changes, compatible with acute sigmoid diverticulitis  - CT AP IC 09/22 Acute sigmoid colonic diverticulitis. No evidence of abscess.  - PO Cipro Flagyl on dc   - tolerating soft diet     # Acute Kidney Injury - resolved  - Likely prerenal from Diarrhea    # Hepatic Steatosis  - Noted on CT AP IC  - Alcohol cessation (occasional per patient)  - Encourage weight loss and low fat diet    # Overactive Bladder  - Resume Tolterodine 4mg QD and prescribe on discharge    # Hypothyroidism  - Resume Levothyroxine 100mcg QD    # DM Type II  - Diet Controlled    # Left Breast Cancer  - Currently in remission for the last 5 years  - Outpatient follow up as needed    # Hiatal Hernia and GERD  - Start Protonix 40mg QD  - Weight loss with life style modifications      # Leg Cramps  # History of Right Rotator Cuff Tear s/p no surgical intervention.   - Home med Endocet 10/325 as needed (prescribed by Dr in [place])  -Leg Cramps on Cyclobenzaprine 10mg BID-TID per patient      Attending Attestation:   Patient seen and examined on day of discharge.     GENERAL: NAD, well-developed, AAOx3  HEENT:  Atraumatic, Normocephalic. EOMI, PERRLA, conjunctiva and sclera clear, No JVD  PULMONARY: Clear to auscultation bilaterally; No wheeze  CARDIOVASCULAR: Regular rate and rhythm; No murmurs, rubs, or gallops  GASTROINTESTINAL: Soft, mild abdominal tenderness, Nondistended; Bowel sounds present  MUSCULOSKELETAL:  2+ Peripheral Pulses, No clubbing, cyanosis, or edema  NEUROLOGY: non-focal  SKIN: No rashes or lesions    I have spent >30m preparing discharge and counselling patient on discharge instructions.    Daysi Villegas DO .

## 2021-09-28 LAB
CULTURE RESULTS: SIGNIFICANT CHANGE UP
SPECIMEN SOURCE: SIGNIFICANT CHANGE UP

## 2021-09-30 DIAGNOSIS — K21.9 GASTRO-ESOPHAGEAL REFLUX DISEASE WITHOUT ESOPHAGITIS: ICD-10-CM

## 2021-09-30 DIAGNOSIS — E11.9 TYPE 2 DIABETES MELLITUS WITHOUT COMPLICATIONS: ICD-10-CM

## 2021-09-30 DIAGNOSIS — J98.11 ATELECTASIS: ICD-10-CM

## 2021-09-30 DIAGNOSIS — N17.9 ACUTE KIDNEY FAILURE, UNSPECIFIED: ICD-10-CM

## 2021-09-30 DIAGNOSIS — Z85.3 PERSONAL HISTORY OF MALIGNANT NEOPLASM OF BREAST: ICD-10-CM

## 2021-09-30 DIAGNOSIS — E03.9 HYPOTHYROIDISM, UNSPECIFIED: ICD-10-CM

## 2021-09-30 DIAGNOSIS — R25.2 CRAMP AND SPASM: ICD-10-CM

## 2021-09-30 DIAGNOSIS — K44.9 DIAPHRAGMATIC HERNIA WITHOUT OBSTRUCTION OR GANGRENE: ICD-10-CM

## 2021-09-30 DIAGNOSIS — K76.0 FATTY (CHANGE OF) LIVER, NOT ELSEWHERE CLASSIFIED: ICD-10-CM

## 2021-09-30 DIAGNOSIS — Z87.891 PERSONAL HISTORY OF NICOTINE DEPENDENCE: ICD-10-CM

## 2021-09-30 DIAGNOSIS — Z88.1 ALLERGY STATUS TO OTHER ANTIBIOTIC AGENTS STATUS: ICD-10-CM

## 2021-09-30 DIAGNOSIS — N32.81 OVERACTIVE BLADDER: ICD-10-CM

## 2021-09-30 DIAGNOSIS — K57.92 DIVERTICULITIS OF INTESTINE, PART UNSPECIFIED, WITHOUT PERFORATION OR ABSCESS WITHOUT BLEEDING: ICD-10-CM

## 2021-09-30 DIAGNOSIS — E87.2 ACIDOSIS: ICD-10-CM

## 2021-09-30 DIAGNOSIS — K57.32 DIVERTICULITIS OF LARGE INTESTINE WITHOUT PERFORATION OR ABSCESS WITHOUT BLEEDING: ICD-10-CM

## 2021-10-11 ENCOUNTER — APPOINTMENT (OUTPATIENT)
Dept: ORTHOPEDIC SURGERY | Facility: CLINIC | Age: 66
End: 2021-10-11
Payer: MEDICAID

## 2021-10-11 VITALS — TEMPERATURE: 97.9 F

## 2021-10-11 PROCEDURE — 73564 X-RAY EXAM KNEE 4 OR MORE: CPT | Mod: RT

## 2021-10-11 PROCEDURE — 99203 OFFICE O/P NEW LOW 30 MIN: CPT

## 2021-10-11 NOTE — ASSESSMENT
[FreeTextEntry1] : 66 year old female presents to the office for a 4 month history of right knee pain. She recently visited the ER and they told her to f/u with an orthopedic surgeon. SHe denies any recent trauma to the knee. She has pain for ambulating and negotiating stairs. No locking or giving way. Her exam today was completely normal as are her radiographs. I therefore feel that this is a self limiting process which would repond to PT, she was given a script for PT. F/u PRN

## 2021-10-11 NOTE — PHYSICAL EXAM
[de-identified] : General appearance: well nourished and hydrated, pleasant, alert and oriented x 3, cooperative.\par Cardiovascular: no apparent abnormalities, no lower leg edema, no varicosities, pedal pulses are palpable.\par Neurologic: sensation is normal, no muscle weakness in upper or lower extremities\par Dermatologic no apparent skin lesions, moist, warm, no rash.\par Gait: nonantalgic.\par \par Left knee\par Inspection: no effusion or erythema.\par Wounds: none.\par Alignment: normal.\par Palpation: no specific tenderness on palpation.\par ROM: 0-120 degrees\par Ligamentous laxity: all ligaments appear stable\par Meniscal Test: negative McMurrays\par Muscle Test: good quad strength.\par \par Right knee\par Inspection: no effusion or erythema.\par Wounds: none.\par Alignment: normal.\par Palpation: no specific tenderness on palpation.\par ROM: 0-120 degrees \par Ligamentous laxity: all ligaments appear stable\par Meniscal Test: negative McMurrays.\par Muscle Test: good quad strength.\par \par Left hip\par Inspection: No swelling or ecchymosis.\par Wounds: none.\par Palpation: non-tender.\par Stability: no instability.\par Strength: 5/5 all motor groups.\par ROM: no pain with FROM.\par Leg length: equal.\par \par Right hip\par Inspection: No swelling or ecchymosis.\par Wounds: none.\par Palpation: non-tender.\par Stability: no instability.\par Strength: 5/5 all motor groups.\par ROM: no pain with FROM.\par Leg length: equal.\par \par   [de-identified] : Radiographs done today AP lateral and skyline of knees shows well maintained joint spaces

## 2021-12-25 NOTE — DISCHARGE NOTE PROVIDER - NSDCCPGOAL_GEN_ALL_CORE_FT
Pt reports medicine I gave her did not improve her headache, MD [place] notified of this new verbal orders received      Mily Maki RN  12/24/21 [id number]    
To get better and follow your care plan as instructed.

## 2022-08-18 ENCOUNTER — OUTPATIENT (OUTPATIENT)
Dept: OUTPATIENT SERVICES | Facility: HOSPITAL | Age: 67
LOS: 1 days | Discharge: HOME | End: 2022-08-18

## 2022-08-18 DIAGNOSIS — Z12.31 ENCOUNTER FOR SCREENING MAMMOGRAM FOR MALIGNANT NEOPLASM OF BREAST: ICD-10-CM

## 2022-08-18 PROCEDURE — 77063 BREAST TOMOSYNTHESIS BI: CPT | Mod: 26

## 2022-08-18 PROCEDURE — 77067 SCR MAMMO BI INCL CAD: CPT | Mod: 26

## 2022-11-12 NOTE — ED ADULT NURSE NOTE - CHIEF COMPLAINT
Subjective:   Patient ID: Bebo Barboza is a 47year old male. Patient is here for routine physical exam. No acute issues. No significant chronic medical problems. Patient is requesting testing. Diet and exercise have been fair. Not exercising too much. Past medical history, family history, and social history were reviewed. Pt is interested in having vasectomy done. Is done with children. History/Other:   Review of Systems   Constitutional: Negative. Negative for fever. HENT: Negative. Eyes: Negative. Respiratory: Negative. Negative for cough and shortness of breath. Cardiovascular: Negative. Negative for chest pain. Gastrointestinal: Negative. Negative for abdominal pain. Genitourinary: Negative. Negative for difficulty urinating. Musculoskeletal: Negative. Negative for arthralgias. Skin: Negative. Neurological: Negative. Negative for dizziness and headaches. Psychiatric/Behavioral: Negative. Negative for dysphoric mood. The patient is not nervous/anxious. No current outpatient medications on file. Allergies:No Known Allergies    Objective:   Physical Exam  Constitutional:       Appearance: Normal appearance. He is well-developed. HENT:      Head: Normocephalic. Right Ear: Tympanic membrane, ear canal and external ear normal.      Left Ear: Tympanic membrane, ear canal and external ear normal.      Nose: Nose normal.      Mouth/Throat:      Mouth: Mucous membranes are moist.      Pharynx: No oropharyngeal exudate or posterior oropharyngeal erythema. Eyes:      Conjunctiva/sclera: Conjunctivae normal.   Cardiovascular:      Rate and Rhythm: Normal rate and regular rhythm. Pulses: Normal pulses. Heart sounds: Normal heart sounds. Pulmonary:      Effort: Pulmonary effort is normal. No respiratory distress. Breath sounds: Normal breath sounds. No wheezing or rales. Abdominal:      General: Abdomen is flat. There is no distension. Palpations: Abdomen is soft. Tenderness: There is no abdominal tenderness. Musculoskeletal:         General: Normal range of motion. Cervical back: Normal range of motion and neck supple. Skin:     General: Skin is warm. Neurological:      General: No focal deficit present. Mental Status: He is alert and oriented to person, place, and time. Sensory: No sensory deficit. Deep Tendon Reflexes: Reflexes are normal and symmetric. Reflexes normal.   Psychiatric:         Mood and Affect: Mood normal.         Behavior: Behavior normal.         Assessment & Plan:   Routine physical examination:  - Exam is unremarkable. Screening tests were discussed, and after discussion, will check lab work as below. Healthy diet, exercise, and weight were discussed. To call if problems and follow up and further management after testing. Routine follow up. Vasectomy evaluation:  - After discussion, will send to urology for further evaluation and discussion on vaectomy; To call if any significant symptoms. Orders Placed This Encounter      CBC, Platelet;  No Differential      Comp Metabolic Panel (14)      Lipid Panel      Vitamin D      Meds This Visit:  Requested Prescriptions      No prescriptions requested or ordered in this encounter       Imaging & Referrals:  UROLOGY - INTERNAL The patient is a 63y Female complaining of vomiting.

## 2022-12-17 ENCOUNTER — EMERGENCY (EMERGENCY)
Facility: HOSPITAL | Age: 67
LOS: 0 days | Discharge: HOME | End: 2022-12-17
Attending: EMERGENCY MEDICINE | Admitting: EMERGENCY MEDICINE

## 2022-12-17 VITALS
OXYGEN SATURATION: 97 % | HEART RATE: 91 BPM | SYSTOLIC BLOOD PRESSURE: 137 MMHG | DIASTOLIC BLOOD PRESSURE: 82 MMHG | TEMPERATURE: 98 F | RESPIRATION RATE: 18 BRPM

## 2022-12-17 DIAGNOSIS — R05.9 COUGH, UNSPECIFIED: ICD-10-CM

## 2022-12-17 DIAGNOSIS — Z85.3 PERSONAL HISTORY OF MALIGNANT NEOPLASM OF BREAST: ICD-10-CM

## 2022-12-17 DIAGNOSIS — Z88.1 ALLERGY STATUS TO OTHER ANTIBIOTIC AGENTS STATUS: ICD-10-CM

## 2022-12-17 DIAGNOSIS — Z87.891 PERSONAL HISTORY OF NICOTINE DEPENDENCE: ICD-10-CM

## 2022-12-17 DIAGNOSIS — Z20.822 CONTACT WITH AND (SUSPECTED) EXPOSURE TO COVID-19: ICD-10-CM

## 2022-12-17 DIAGNOSIS — J11.1 INFLUENZA DUE TO UNIDENTIFIED INFLUENZA VIRUS WITH OTHER RESPIRATORY MANIFESTATIONS: ICD-10-CM

## 2022-12-17 DIAGNOSIS — Z90.10 ACQUIRED ABSENCE OF UNSPECIFIED BREAST AND NIPPLE: ICD-10-CM

## 2022-12-17 DIAGNOSIS — E03.9 HYPOTHYROIDISM, UNSPECIFIED: ICD-10-CM

## 2022-12-17 DIAGNOSIS — E11.9 TYPE 2 DIABETES MELLITUS WITHOUT COMPLICATIONS: ICD-10-CM

## 2022-12-17 DIAGNOSIS — R11.2 NAUSEA WITH VOMITING, UNSPECIFIED: ICD-10-CM

## 2022-12-17 DIAGNOSIS — R50.9 FEVER, UNSPECIFIED: ICD-10-CM

## 2022-12-17 DIAGNOSIS — I10 ESSENTIAL (PRIMARY) HYPERTENSION: ICD-10-CM

## 2022-12-17 DIAGNOSIS — Z92.3 PERSONAL HISTORY OF IRRADIATION: ICD-10-CM

## 2022-12-17 LAB
ALBUMIN SERPL ELPH-MCNC: 4.6 G/DL — SIGNIFICANT CHANGE UP (ref 3.5–5.2)
ALP SERPL-CCNC: 69 U/L — SIGNIFICANT CHANGE UP (ref 30–115)
ALT FLD-CCNC: 25 U/L — SIGNIFICANT CHANGE UP (ref 0–41)
ANION GAP SERPL CALC-SCNC: 14 MMOL/L — SIGNIFICANT CHANGE UP (ref 7–14)
AST SERPL-CCNC: 24 U/L — SIGNIFICANT CHANGE UP (ref 0–41)
BASOPHILS # BLD AUTO: 0.04 K/UL — SIGNIFICANT CHANGE UP (ref 0–0.2)
BASOPHILS NFR BLD AUTO: 0.8 % — SIGNIFICANT CHANGE UP (ref 0–1)
BILIRUB SERPL-MCNC: 0.5 MG/DL — SIGNIFICANT CHANGE UP (ref 0.2–1.2)
BUN SERPL-MCNC: 8 MG/DL — LOW (ref 10–20)
CALCIUM SERPL-MCNC: 9.2 MG/DL — SIGNIFICANT CHANGE UP (ref 8.4–10.5)
CHLORIDE SERPL-SCNC: 101 MMOL/L — SIGNIFICANT CHANGE UP (ref 98–110)
CO2 SERPL-SCNC: 23 MMOL/L — SIGNIFICANT CHANGE UP (ref 17–32)
CREAT SERPL-MCNC: 0.6 MG/DL — LOW (ref 0.7–1.5)
EGFR: 100 ML/MIN/1.73M2 — SIGNIFICANT CHANGE UP
EOSINOPHIL # BLD AUTO: 0.04 K/UL — SIGNIFICANT CHANGE UP (ref 0–0.7)
EOSINOPHIL NFR BLD AUTO: 0.8 % — SIGNIFICANT CHANGE UP (ref 0–8)
FLUAV AG NPH QL: DETECTED
FLUBV AG NPH QL: SIGNIFICANT CHANGE UP
GLUCOSE SERPL-MCNC: 106 MG/DL — HIGH (ref 70–99)
HCT VFR BLD CALC: 34.5 % — LOW (ref 37–47)
HGB BLD-MCNC: 11.8 G/DL — LOW (ref 12–16)
IMM GRANULOCYTES NFR BLD AUTO: 0.6 % — HIGH (ref 0.1–0.3)
LACTATE SERPL-SCNC: 1.3 MMOL/L — SIGNIFICANT CHANGE UP (ref 0.7–2)
LYMPHOCYTES # BLD AUTO: 1.26 K/UL — SIGNIFICANT CHANGE UP (ref 1.2–3.4)
LYMPHOCYTES # BLD AUTO: 23.6 % — SIGNIFICANT CHANGE UP (ref 20.5–51.1)
MCHC RBC-ENTMCNC: 28.3 PG — SIGNIFICANT CHANGE UP (ref 27–31)
MCHC RBC-ENTMCNC: 34.2 G/DL — SIGNIFICANT CHANGE UP (ref 32–37)
MCV RBC AUTO: 82.7 FL — SIGNIFICANT CHANGE UP (ref 81–99)
MONOCYTES # BLD AUTO: 0.77 K/UL — HIGH (ref 0.1–0.6)
MONOCYTES NFR BLD AUTO: 14.4 % — HIGH (ref 1.7–9.3)
NEUTROPHILS # BLD AUTO: 3.19 K/UL — SIGNIFICANT CHANGE UP (ref 1.4–6.5)
NEUTROPHILS NFR BLD AUTO: 59.8 % — SIGNIFICANT CHANGE UP (ref 42.2–75.2)
NRBC # BLD: 0 /100 WBCS — SIGNIFICANT CHANGE UP (ref 0–0)
PLATELET # BLD AUTO: 199 K/UL — SIGNIFICANT CHANGE UP (ref 130–400)
POTASSIUM SERPL-MCNC: 3.4 MMOL/L — LOW (ref 3.5–5)
POTASSIUM SERPL-SCNC: 3.4 MMOL/L — LOW (ref 3.5–5)
PROT SERPL-MCNC: 7 G/DL — SIGNIFICANT CHANGE UP (ref 6–8)
RBC # BLD: 4.17 M/UL — LOW (ref 4.2–5.4)
RBC # FLD: 15.4 % — HIGH (ref 11.5–14.5)
RSV RNA NPH QL NAA+NON-PROBE: SIGNIFICANT CHANGE UP
SARS-COV-2 RNA SPEC QL NAA+PROBE: SIGNIFICANT CHANGE UP
SODIUM SERPL-SCNC: 138 MMOL/L — SIGNIFICANT CHANGE UP (ref 135–146)
WBC # BLD: 5.33 K/UL — SIGNIFICANT CHANGE UP (ref 4.8–10.8)
WBC # FLD AUTO: 5.33 K/UL — SIGNIFICANT CHANGE UP (ref 4.8–10.8)

## 2022-12-17 PROCEDURE — 99285 EMERGENCY DEPT VISIT HI MDM: CPT

## 2022-12-17 PROCEDURE — 93010 ELECTROCARDIOGRAM REPORT: CPT

## 2022-12-17 PROCEDURE — 71045 X-RAY EXAM CHEST 1 VIEW: CPT | Mod: 26

## 2022-12-17 RX ORDER — ONDANSETRON 8 MG/1
1 TABLET, FILM COATED ORAL
Qty: 15 | Refills: 0
Start: 2022-12-17 | End: 2022-12-21

## 2022-12-17 RX ORDER — POTASSIUM CHLORIDE 20 MEQ
20 PACKET (EA) ORAL ONCE
Refills: 0 | Status: DISCONTINUED | OUTPATIENT
Start: 2022-12-17 | End: 2022-12-17

## 2022-12-17 RX ORDER — SODIUM CHLORIDE 9 MG/ML
1000 INJECTION INTRAMUSCULAR; INTRAVENOUS; SUBCUTANEOUS ONCE
Refills: 0 | Status: COMPLETED | OUTPATIENT
Start: 2022-12-17 | End: 2022-12-17

## 2022-12-17 RX ORDER — ACETAMINOPHEN 500 MG
975 TABLET ORAL ONCE
Refills: 0 | Status: COMPLETED | OUTPATIENT
Start: 2022-12-17 | End: 2022-12-17

## 2022-12-17 RX ORDER — POTASSIUM CHLORIDE 20 MEQ
40 PACKET (EA) ORAL ONCE
Refills: 0 | Status: COMPLETED | OUTPATIENT
Start: 2022-12-17 | End: 2022-12-17

## 2022-12-17 RX ORDER — METOCLOPRAMIDE HCL 10 MG
10 TABLET ORAL ONCE
Refills: 0 | Status: COMPLETED | OUTPATIENT
Start: 2022-12-17 | End: 2022-12-17

## 2022-12-17 RX ADMIN — Medication 40 MILLIEQUIVALENT(S): at 12:04

## 2022-12-17 RX ADMIN — SODIUM CHLORIDE 1000 MILLILITER(S): 9 INJECTION INTRAMUSCULAR; INTRAVENOUS; SUBCUTANEOUS at 09:03

## 2022-12-17 RX ADMIN — Medication 975 MILLIGRAM(S): at 09:03

## 2022-12-17 RX ADMIN — Medication 10 MILLIGRAM(S): at 09:03

## 2022-12-17 NOTE — ED PROVIDER NOTE - PATIENT PORTAL LINK FT
You can access the FollowMyHealth Patient Portal offered by [facility] by registering at the following website: [email or web address]. By joining [employer]’s FollowMyHealth portal, you will also be able to view your health information using other applications (apps) compatible with our system.

## 2022-12-17 NOTE — ED PROVIDER NOTE - NS ED ROS FT
Constitutional: (+) fever, (+)chills  Eyes/ENT: (-) blurry vision, (-) epistaxis  Cardiovascular: (-) chest pain, (-) syncope  Respiratory: (+) cough, (-) shortness of breath  Gastrointestinal: (+)nausea, (+) vomiting, (-) diarrhea  Musculoskeletal: (-) neck pain, (-) back pain, (+) joint pain  Integumentary: (-) rash, (-) edema  Neurological: (-) headache, (-) altered mental status  Psychiatric: (-) hallucinations  Allergic/Immunologic: (-) pruritus

## 2022-12-17 NOTE — ED PROVIDER NOTE - CLINICAL SUMMARY MEDICAL DECISION MAKING FREE TEXT BOX
Patient presented with cough, myalgias, N/V, fever and sore throat x several days. Otherwise HD stable, well appearing, no acute respiratory distress on RA. EKG non-ischemic. Obtained labs which were grossly unremarkable including no significant leukocytosis, anemia, signs of dehydration/MARIANO, transaminitis or significant electrolyte abnormalities. CXR grossly negative without focal consolidations to suggest overlying PNA. Patient remained stable on RA, and able to ambulate without difficulty or desaturation. Given the above, will discharge home with outpatient follow up. Patient agreeable with plan. Agrees to return to ED immediately for any new or worsening symptoms.

## 2022-12-17 NOTE — ED ADULT TRIAGE NOTE - TEMPERATURE IN CELSIUS (DEGREES C)
OFFICE VISIT      Patient: Hill Bowen Date of Service: 2019   : 1949 MRN: [id number]     SUBJECTIVE:   HISTORY OF PRESENT ILLNESS:  Hill Bowen is a 69 year old male who presents today for     Patient is here today with a rash that is at the bottom of both ankles and back of legs. It is not itchy, non tender. It started on the calf 4 days ago and worsened yesterday. The rash is no where else on the body. Patient tried hydrogen peroxide to clean the leg and a moisturizing lotion with no relief. He swims a lot in chlorine and is outside hiking a lot.   He has been training for an extreme bike, swim race coming up.      No known tick bite.     Pt states he just had blood work up with his cardiologist and everything came back \"normal\"      PAST MEDICAL HISTORY:  History reviewed. No pertinent past medical history.    MEDICATIONS:  Current Outpatient Medications   Medication Sig   • aspirin 81 MG tablet Take 81 mg by mouth daily.   • atorvastatin (LIPITOR) 40 MG tablet Take 40 mg by mouth daily.   • Inulin (FIBER CHOICE PO)    • metoPROLOL succinate (TOPROL-XL) 25 MG 24 hr tablet Take 25 mg by mouth daily.   • omeprazole (PRILOSEC) 20 MG capsule Take 20 mg by mouth daily.   • sildenafil (VIAGRA) 100 MG tablet Take 100 mg by mouth as needed for Erectile Dysfunction.     No current facility-administered medications for this visit.        ALLERGIES:  ALLERGIES:   Allergen Reactions   • Anesthesia S-I-60 CARDIAC DISTURBANCES       PAST SURGICAL HISTORY:  Past Surgical History:   Procedure Laterality Date   • Coronary artery bypass graft         FAMILY HISTORY:  No family history on file.    SOCIAL HISTORY:  Social History     Tobacco Use   • Smoking status: Never Smoker   • Smokeless tobacco: Never Used   Substance Use Topics   • Alcohol use: No     Frequency: Never   • Drug use: Not on file       Review of Systems   Constitutional: Negative for chills and fever.   Eyes: Negative.    Respiratory:  Negative.    Cardiovascular: Negative.    Skin: Positive for rash.        Bilateral lower ankles and medial calf   Neurological: Negative for headaches.   Hematological: Negative for adenopathy.         OBJECTIVE:     Physical Exam       Vitals reviewed   GEN: alert, cooperative, NAD, sitting on exam table  HEENT: no scleral icterus or injection, neck supple, no cervical LAD   CV: RRR, normal S1/S2, no m/r/g, no edema noted  PULM: RR non labored, rate normal, CTAB, no wheezing or crackles appreciated, good air movement  MSK: symmetrical muscle tone, no joint edema, or deformity noted  LYMPH: no LAD  SKIN: warm, dry, intact. Symmetrical petechiae rash to bilateral inner ankles and medial aspect of calves. Non blanchable. Non tender to touch. Not warm to touch.   NEURO: alert and interactive, casual gait normal  PSYCH: normal affect, appropriate mood        Visit Vitals  /60   Pulse 62   Temp 97.8 °F (36.6 °C)   Resp 14   SpO2 96%       DIAGNOSTIC STUDIES:   LAB RESULTS:    No results found for this visit on 07/20/19.    Assessment AND PLAN:   This is a 69 year old year-old male who presents with     1. Petechiae        If no improvement in symptoms OR any worse symptoms see your primary care doctor OR go to Emergency Department    Instructions provided as documented in the AVS.    Follow up with cardiologist on Monday and discuss ASA options and diagnosis from visit today.       The patient indicated understanding of the diagnosis and agreed with the plan of care.       36.8

## 2022-12-17 NOTE — ED PROVIDER NOTE - OBJECTIVE STATEMENT
67-year-old female with history of breast CA status postlumpectomy, s/p radiation for treatment, diabetes, hypertension, hypothyroid presents to the ED complaining of cough, body aches, nausea, vomiting, fever, chills and sore throat for 2 days.  Patient denies any abdominal pain, back pain, chest pain, shortness of breath or leg pain.

## 2022-12-17 NOTE — ED PROVIDER NOTE - NS ED ATTENDING STATEMENT MOD
This was a shared visit with the DANETTE. I reviewed and verified the documentation and independently performed the documented:

## 2023-01-09 ENCOUNTER — EMERGENCY (EMERGENCY)
Facility: HOSPITAL | Age: 68
LOS: 0 days | Discharge: HOME | End: 2023-01-09
Attending: EMERGENCY MEDICINE | Admitting: EMERGENCY MEDICINE
Payer: MEDICARE

## 2023-01-09 VITALS
DIASTOLIC BLOOD PRESSURE: 98 MMHG | HEART RATE: 92 BPM | OXYGEN SATURATION: 98 % | WEIGHT: 179.9 LBS | SYSTOLIC BLOOD PRESSURE: 196 MMHG | RESPIRATION RATE: 20 BRPM | TEMPERATURE: 99 F

## 2023-01-09 VITALS
OXYGEN SATURATION: 98 % | SYSTOLIC BLOOD PRESSURE: 133 MMHG | DIASTOLIC BLOOD PRESSURE: 77 MMHG | HEART RATE: 78 BPM | TEMPERATURE: 98 F

## 2023-01-09 DIAGNOSIS — Z87.891 PERSONAL HISTORY OF NICOTINE DEPENDENCE: ICD-10-CM

## 2023-01-09 DIAGNOSIS — R53.1 WEAKNESS: ICD-10-CM

## 2023-01-09 DIAGNOSIS — Z88.2 ALLERGY STATUS TO SULFONAMIDES: ICD-10-CM
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
## 2023-01-09 DIAGNOSIS — I10 ESSENTIAL (PRIMARY) HYPERTENSION: ICD-10-CM

## 2023-01-09 DIAGNOSIS — Z20.822 CONTACT WITH AND (SUSPECTED) EXPOSURE TO COVID-19: ICD-10-CM
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
## 2023-01-09 DIAGNOSIS — E11.9 TYPE 2 DIABETES MELLITUS WITHOUT COMPLICATIONS: ICD-10-CM

## 2023-01-09 DIAGNOSIS — E07.9 DISORDER OF THYROID, UNSPECIFIED: ICD-10-CM

## 2023-01-09 DIAGNOSIS — Z90.49 ACQUIRED ABSENCE OF OTHER SPECIFIED PARTS OF DIGESTIVE TRACT: ICD-10-CM

## 2023-01-09 DIAGNOSIS — R11.0 NAUSEA: ICD-10-CM

## 2023-01-09 DIAGNOSIS — S09.90XA UNSPECIFIED INJURY OF HEAD, INITIAL ENCOUNTER: ICD-10-CM

## 2023-01-09 DIAGNOSIS — Z85.3 PERSONAL HISTORY OF MALIGNANT NEOPLASM OF BREAST: ICD-10-CM

## 2023-01-09 DIAGNOSIS — M25.512 PAIN IN LEFT SHOULDER: ICD-10-CM

## 2023-01-09 DIAGNOSIS — R51.9 HEADACHE, UNSPECIFIED: ICD-10-CM

## 2023-01-09 DIAGNOSIS — Z88.1 ALLERGY STATUS TO OTHER ANTIBIOTIC AGENTS STATUS: ICD-10-CM

## 2023-01-09 LAB
ALBUMIN SERPL ELPH-MCNC: 5 G/DL — SIGNIFICANT CHANGE UP (ref 3.5–5.2)
ALP SERPL-CCNC: 88 U/L — SIGNIFICANT CHANGE UP (ref 30–115)
ALT FLD-CCNC: 20 U/L — SIGNIFICANT CHANGE UP (ref 0–41)
ANION GAP SERPL CALC-SCNC: 17 MMOL/L — HIGH (ref 7–14)
APPEARANCE UR: CLEAR — SIGNIFICANT CHANGE UP
AST SERPL-CCNC: 22 U/L — SIGNIFICANT CHANGE UP (ref 0–41)
B-OH-BUTYR SERPL-SCNC: <0.2 MMOL/L — SIGNIFICANT CHANGE UP
BACTERIA # UR AUTO: NEGATIVE — SIGNIFICANT CHANGE UP
BASE EXCESS BLDV CALC-SCNC: 0.3 MMOL/L — SIGNIFICANT CHANGE UP (ref -2–3)
BASOPHILS # BLD AUTO: 0.05 K/UL — SIGNIFICANT CHANGE UP (ref 0–0.2)
BASOPHILS NFR BLD AUTO: 0.5 % — SIGNIFICANT CHANGE UP (ref 0–1)
BILIRUB SERPL-MCNC: 0.6 MG/DL — SIGNIFICANT CHANGE UP (ref 0.2–1.2)
BILIRUB UR-MCNC: NEGATIVE — SIGNIFICANT CHANGE UP
BUN SERPL-MCNC: 10 MG/DL — SIGNIFICANT CHANGE UP (ref 10–20)
CALCIUM SERPL-MCNC: 10.7 MG/DL — HIGH (ref 8.4–10.5)
CHLORIDE SERPL-SCNC: 106 MMOL/L — SIGNIFICANT CHANGE UP (ref 98–110)
CO2 SERPL-SCNC: 21 MMOL/L — SIGNIFICANT CHANGE UP (ref 17–32)
COLOR SPEC: COLORLESS — SIGNIFICANT CHANGE UP
CREAT SERPL-MCNC: 0.6 MG/DL — LOW (ref 0.7–1.5)
DIFF PNL FLD: NEGATIVE — SIGNIFICANT CHANGE UP
EGFR: 100 ML/MIN/1.73M2 — SIGNIFICANT CHANGE UP
EOSINOPHIL # BLD AUTO: 0.25 K/UL — SIGNIFICANT CHANGE UP (ref 0–0.7)
EOSINOPHIL NFR BLD AUTO: 2.7 % — SIGNIFICANT CHANGE UP (ref 0–8)
EPI CELLS # UR: 4 /HPF — SIGNIFICANT CHANGE UP (ref 0–5)
FLUAV AG NPH QL: SIGNIFICANT CHANGE UP
FLUBV AG NPH QL: SIGNIFICANT CHANGE UP
GAS PNL BLDV: SIGNIFICANT CHANGE UP
GLUCOSE SERPL-MCNC: 92 MG/DL — SIGNIFICANT CHANGE UP (ref 70–99)
GLUCOSE UR QL: NEGATIVE — SIGNIFICANT CHANGE UP
HCO3 BLDV-SCNC: 25 MMOL/L — SIGNIFICANT CHANGE UP (ref 22–29)
HCT VFR BLD CALC: 38.7 % — SIGNIFICANT CHANGE UP (ref 37–47)
HGB BLD-MCNC: 13.1 G/DL — SIGNIFICANT CHANGE UP (ref 12–16)
HYALINE CASTS # UR AUTO: 1 /LPF — SIGNIFICANT CHANGE UP (ref 0–7)
IMM GRANULOCYTES NFR BLD AUTO: 0.6 % — HIGH (ref 0.1–0.3)
KETONES UR-MCNC: NEGATIVE — SIGNIFICANT CHANGE UP
LACTATE BLDV-MCNC: 2.5 MMOL/L — HIGH (ref 0.5–2)
LEUKOCYTE ESTERASE UR-ACNC: ABNORMAL
LYMPHOCYTES # BLD AUTO: 2.13 K/UL — SIGNIFICANT CHANGE UP (ref 1.2–3.4)
LYMPHOCYTES # BLD AUTO: 23.1 % — SIGNIFICANT CHANGE UP (ref 20.5–51.1)
MCHC RBC-ENTMCNC: 28.7 PG — SIGNIFICANT CHANGE UP (ref 27–31)
MCHC RBC-ENTMCNC: 33.9 G/DL — SIGNIFICANT CHANGE UP (ref 32–37)
MCV RBC AUTO: 84.9 FL — SIGNIFICANT CHANGE UP (ref 81–99)
MONOCYTES # BLD AUTO: 0.68 K/UL — HIGH (ref 0.1–0.6)
MONOCYTES NFR BLD AUTO: 7.4 % — SIGNIFICANT CHANGE UP (ref 1.7–9.3)
NEUTROPHILS # BLD AUTO: 6.07 K/UL — SIGNIFICANT CHANGE UP (ref 1.4–6.5)
NEUTROPHILS NFR BLD AUTO: 65.7 % — SIGNIFICANT CHANGE UP (ref 42.2–75.2)
NITRITE UR-MCNC: NEGATIVE — SIGNIFICANT CHANGE UP
NRBC # BLD: 0 /100 WBCS — SIGNIFICANT CHANGE UP (ref 0–0)
PCO2 BLDV: 38 MMHG — LOW (ref 39–42)
PH BLDV: 7.42 — SIGNIFICANT CHANGE UP (ref 7.32–7.43)
PH UR: 7 — SIGNIFICANT CHANGE UP (ref 5–8)
PLATELET # BLD AUTO: 260 K/UL — SIGNIFICANT CHANGE UP (ref 130–400)
PO2 BLDV: 57 MMHG — SIGNIFICANT CHANGE UP
POTASSIUM SERPL-MCNC: 4.1 MMOL/L — SIGNIFICANT CHANGE UP (ref 3.5–5)
POTASSIUM SERPL-SCNC: 4.1 MMOL/L — SIGNIFICANT CHANGE UP (ref 3.5–5)
PROT SERPL-MCNC: 7.7 G/DL — SIGNIFICANT CHANGE UP (ref 6–8)
PROT UR-MCNC: NEGATIVE — SIGNIFICANT CHANGE UP
RBC # BLD: 4.56 M/UL — SIGNIFICANT CHANGE UP (ref 4.2–5.4)
RBC # FLD: 14.4 % — SIGNIFICANT CHANGE UP (ref 11.5–14.5)
RBC CASTS # UR COMP ASSIST: 4 /HPF — SIGNIFICANT CHANGE UP (ref 0–4)
RSV RNA NPH QL NAA+NON-PROBE: SIGNIFICANT CHANGE UP
SAO2 % BLDV: 89.2 % — SIGNIFICANT CHANGE UP
SARS-COV-2 RNA SPEC QL NAA+PROBE: SIGNIFICANT CHANGE UP
SODIUM SERPL-SCNC: 144 MMOL/L — SIGNIFICANT CHANGE UP (ref 135–146)
SP GR SPEC: 1 — LOW (ref 1.01–1.03)
TROPONIN T SERPL-MCNC: <0.01 NG/ML — SIGNIFICANT CHANGE UP
UROBILINOGEN FLD QL: SIGNIFICANT CHANGE UP
WBC # BLD: 9.24 K/UL — SIGNIFICANT CHANGE UP (ref 4.8–10.8)
WBC # FLD AUTO: 9.24 K/UL — SIGNIFICANT CHANGE UP (ref 4.8–10.8)
WBC UR QL: 8 /HPF — HIGH (ref 0–5)

## 2023-01-09 PROCEDURE — 70450 CT HEAD/BRAIN W/O DYE: CPT | Mod: 26,MA

## 2023-01-09 PROCEDURE — 99285 EMERGENCY DEPT VISIT HI MDM: CPT

## 2023-01-09 PROCEDURE — 71045 X-RAY EXAM CHEST 1 VIEW: CPT | Mod: 26

## 2023-01-09 PROCEDURE — 99284 EMERGENCY DEPT VISIT MOD MDM: CPT

## 2023-01-09 PROCEDURE — 93010 ELECTROCARDIOGRAM REPORT: CPT

## 2023-01-09 PROCEDURE — 73030 X-RAY EXAM OF SHOULDER: CPT | Mod: 26,LT

## 2023-01-09 RX ORDER — SODIUM CHLORIDE 9 MG/ML
1000 INJECTION INTRAMUSCULAR; INTRAVENOUS; SUBCUTANEOUS ONCE
Refills: 0 | Status: COMPLETED | OUTPATIENT
Start: 2023-01-09 | End: 2023-01-09

## 2023-01-09 RX ORDER — KETOROLAC TROMETHAMINE 30 MG/ML
15 SYRINGE (ML) INJECTION ONCE
Refills: 0 | Status: DISCONTINUED | OUTPATIENT
Start: 2023-01-09 | End: 2023-01-09

## 2023-01-09 RX ORDER — FAMOTIDINE 10 MG/ML
20 INJECTION INTRAVENOUS ONCE
Refills: 0 | Status: COMPLETED | OUTPATIENT
Start: 2023-01-09 | End: 2023-01-09

## 2023-01-09 RX ADMIN — Medication 15 MILLIGRAM(S): at 14:19

## 2023-01-09 RX ADMIN — SODIUM CHLORIDE 1000 MILLILITER(S): 9 INJECTION INTRAMUSCULAR; INTRAVENOUS; SUBCUTANEOUS at 14:19

## 2023-01-09 RX ADMIN — FAMOTIDINE 20 MILLIGRAM(S): 10 INJECTION INTRAVENOUS at 15:34

## 2023-01-09 NOTE — ED PROVIDER NOTE - NORMAL, MLM
[facility] Note:  Antibiotic Dose Adjustment    Ceftriaxone (Rocephin) 1g IV once was ordered for Nicolas Vicente. Body mass index is 43.65 kg/m².   Wt Readings from Last 6 Encounters:  04/25/19 : (!) 154.2 kg (340 lb)  02/11/17 : (!) 158.8 kg (350 lb)
amadou all pertinent systems normal

## 2023-01-09 NOTE — ED PROVIDER NOTE - NS ED ROS FT
Constitutional: No fever, chills.  Cardiac: No chest pain, SOB or edema.  Respiratory: No cough or respiratory distress.   GI: No vomiting, diarrhea or abdominal pain.  Except as documented in the HPI, all other systems are negative.

## 2023-01-09 NOTE — ED PROVIDER NOTE - PATIENT PORTAL LINK FT
You can access the FollowMyHealth Patient Portal offered by [facility] by registering at the following website: [email or web address]. By joining [facility]’s FollowMyHealth portal, you will also be able to view your health information using other applications (apps) compatible with our system.

## 2023-01-09 NOTE — ED PROVIDER NOTE - CARE PLAN
1 Principal Discharge DX:	Weakness  Secondary Diagnosis:	Fall  Secondary Diagnosis:	Closed head injury  Secondary Diagnosis:	Left shoulder pain

## 2023-01-09 NOTE — ED PROVIDER NOTE - NSFOLLOWUPINSTRUCTIONS_ED_ALL_ED_FT
Follow up with your primary doctor in 1-2 days.  If you do not have a doctor, CALL [phone] to find a physician to follow up with.    Our Emergency Department Referral Coordinators will be reaching out ot you in the next 24-48 hours from 9:00am to 5:00pm (Monday to Friday) with a follow up orthopedic appointment. Please expect a phone call from the hospital in that time frame. If you do not receive a call or if you have any questions or concerns, you can reach them at [phone] or [phone].      Weakness      Weakness is a lack of strength. You may feel weak all over your body (generalized), or you may feel weak in one specific part of your body (focal). Common causes of weakness include:  •Infection and immune system disorders.      •Physical exhaustion.      •Internal bleeding or other blood loss that results in a lack of red blood cells (anemia).      •Dehydration.      •An imbalance in mineral (electrolyte) levels, such as potassium.      •Heart disease, circulation problems, or stroke.      Other causes include:  •Some medicines or cancer treatment.      •Stress, anxiety, or depression.      •Nervous system disorders.      •Thyroid disorders.      •Loss of muscle strength because of age or inactivity.      •Poor sleep quality or sleep disorders.      The cause of your weakness may not be known. Some causes of weakness can be serious, so it is important to see your health care provider.      Follow these instructions at home:    Activity     •Rest as needed.      •Try to get enough sleep. Most adults need 7–8 hours of quality sleep each night. Talk to your health care provider about how much sleep you need each night.      •Do exercises, such as arm curls and leg raises, for 30 minutes at least 2 days a week or as told by your health care provider. This helps build muscle strength.      •Consider working with a physical therapist or  who can develop an exercise plan to help you gain muscle strength.        General instructions      •Take over-the-counter and prescription medicines only as told by your health care provider.    •Eat a healthy, well-balanced diet. This includes:  •Proteins to build muscles, such as lean meats and fish.      •Fresh fruits and vegetables.      •Carbohydrates to boost energy, such as whole grains.        •Drink enough fluid to keep your urine pale yellow.      •Keep all follow-up visits as told by your health care provider. This is important.        Contact a health care provider if your weakness:    •Does not improve or gets worse.      •Affects your ability to think clearly.      •Affects your ability to do your normal daily activities.        Get help right away if you:    •Develop sudden weakness, especially on one side of your face or body.      •Have chest pain.      •Have trouble breathing or shortness of breath.      •Have problems with your vision.      •Have trouble talking or swallowing.      •Have trouble standing or walking.      •Are light-headed or lose consciousness.        Summary    •Weakness is a lack of strength. You may feel weak all over your body or just in one specific part of your body.      •Weakness can be caused by a variety of things. In some cases, the cause may be unknown.      •Rest as needed, and try to get enough sleep. Most adults need 7–8 hours of quality sleep each night.      •Eat a healthy, well-balanced diet.      Shoulder Pain      Many things can cause shoulder pain, including:  •An injury to the shoulder.      •Overuse of the shoulder.      •Arthritis.      The source of the pain can be:  •Inflammation.      •An injury to the shoulder joint.      •An injury to a tendon, ligament, or bone.        Follow these instructions at home:    Pay attention to changes in your symptoms. Let your health care provider know about them. Follow these instructions to relieve your pain.    If you have a sling:     •Wear the sling as told by your health care provider. Remove it only as told by your health care provider.       • Loosen the sling if your fingers tingle, become numb, or turn cold and blue.      •Keep the sling clean.    •If the sling is not waterproof:  •Do not let it get wet. Remove it to shower or bathe.         •Move your arm as little as possible, but keep your hand moving to prevent swelling.        Managing pain, stiffness, and swelling   Bag of ice on a towel on the skin. •If directed, put ice on the painful area:  •Put ice in a plastic bag.      •Place a towel between your skin and the bag.      •Leave the ice on for 20 minutes, 2–3 times per day. Stop applying ice if it does not help with the pain.        •Squeeze a soft ball or a foam pad as much as possible. This helps to keep the shoulder from swelling. It also helps to strengthen the arm.      General instructions     •Take over-the-counter and prescription medicines only as told by your health care provider.      •Keep all follow-up visits as told by your health care provider. This is important.        Contact a health care provider if:    •Your pain gets worse.      •Your pain is not relieved with medicines.      •New pain develops in your arm, hand, or fingers.        Get help right away if:  •Your arm, hand, or fingers:  •Tingle.      •Become numb.      •Become swollen.      •Become painful.      •Turn white or blue.          Summary    •Shoulder pain can be caused by an injury, overuse, or arthritis.      •Pay attention to changes in your symptoms. Let your health care provider know about them.      •This condition may be treated with a sling, ice, and pain medicines.      •Contact your health care provider if the pain gets worse or new pain develops. Get help right away if your arm, hand, or fingers tingle or become numb, swollen, or painful.      •Keep all follow-up visits as told by your health care provider. This is important.

## 2023-01-09 NOTE — ED PROVIDER NOTE - CLINICAL SUMMARY MEDICAL DECISION MAKING FREE TEXT BOX
Gen weakness, recent fall. Character low suspicion for CVA and no focal or localizing findings. No significant arrhythmia or e/o aortic outflow obstruction. No anemia or bleeding or gross electrolyte abnormalities. No CP/SOB to suggest ACS or e/o DVT to suggest PE. No fever or specific infectious symptoms, and UA normal. The patient was given detailed return precautions and advised to return to the emergency department in 2-3 days if not improving or sooner if any new symptoms developed, symptoms worsened or for any concerns. The patient was offered the opportunity to ask questions and verbalized that they understand the diagnosis and discharge instructions.

## 2023-01-09 NOTE — ED PROVIDER NOTE - OBJECTIVE STATEMENT
67-year-old female history of HTN, DM, thyroid disease, breast CA s/p lumpectomy, cholecystectomy, now presents with generalized weakness for the past 3 weeks, persistent, denies modifying factors, diagnosed with flu on 12/17, associated gradual onset of nonexertional frontal headache and nausea, also reports left shoulder pain after fall 2 weeks ago, denies fever, ear pain, sore throat, stuffy / runny nose, neck pain, cough, chest / abdominal pain, v/d, respiratory sx, change in bowel habits or urinary sx, vaginal d/c, rash or other associated complaints at present. Old chart reviewed. I have reviewed and agree with the initial nursing note, except as documented in my note.

## 2023-01-12 ENCOUNTER — APPOINTMENT (OUTPATIENT)
Dept: ORTHOPEDIC SURGERY | Facility: CLINIC | Age: 68
End: 2023-01-12
Payer: MEDICARE

## 2023-01-12 VITALS — WEIGHT: 145 LBS | BODY MASS INDEX: 26.68 KG/M2 | HEIGHT: 62 IN

## 2023-01-12 PROCEDURE — 73552 X-RAY EXAM OF FEMUR 2/>: CPT | Mod: LT

## 2023-01-12 PROCEDURE — 99203 OFFICE O/P NEW LOW 30 MIN: CPT

## 2023-01-12 PROCEDURE — 73060 X-RAY EXAM OF HUMERUS: CPT | Mod: LT

## 2023-01-12 NOTE — DATA REVIEWED
[FreeTextEntry1] : X-ray left shoulder [facility]: No acute fractures consultations, dislocations.  Moderate degenerative changes. [FreeTextEntry2] : X-ray left femur: No acute fractures, subluxations, dislocations.

## 2023-01-12 NOTE — PHYSICAL EXAM
[Left] : left hip [2+] : posterior tibialis pulse: 2+ [FreeTextEntry8] : Tenderness to midshaft humerus [TWNoteComboBox7] : active forward flexion 110 degrees [de-identified] : active abduction 110 degrees [TWNoteComboBox6] : internal rotation L4 [] : negative impingement maneuvers [FreeTextEntry9] : Good range of motion with mild pain to middle hamstring [de-identified] : Good strength throughout with pain with hamstring testing

## 2023-01-12 NOTE — DISCUSSION/SUMMARY
[de-identified] : Left shoulder: Discussed with patient that she has a strain of the left shoulder/contusion to humerus.  Discussed with patient that these injuries can take 4 to 6 weeks to fully heal.  First 2 weeks usually worse.  Discussed treatment options detail including rest, Tylenol, range of motion exercise, physical therapy, ice/heat.  Physical therapy prescription given.\par \par Left leg: Patient has strain of left hamstring.  Discussed with patient that generally symptoms can last from 4 to 6 weeks to a couple months.  Discussed treatment options detail including ice/heat, range of motion exercise, physical therapy, Tylenol, range of motion exercises.  Physical therapy prescription given.\par \par Advised patient to call office if symptoms worsen or continue.  Advised patient to follow-up with neurology due to continued feeling off balance.  Prescription given for walker since patient is feeling off balance.  Also advised patient to follow-up with primary physician.\par Advised to go to emergency room if symptoms worsen\par Patient will follow-up in 6 to 8 weeks for reevaluation.  Seen in supervision of Dr. Esparza.

## 2023-01-12 NOTE — HISTORY OF PRESENT ILLNESS
[de-identified] : Patient is a 67-year-old female here for evaluation of left leg and left upper arm/shoulder.  Patient states about 3 weeks ago she was went to  water, tripped and fell onto left side.  Patient was immediate pain.  Patient went to [facility] for evaluation.  X-rays were taken and read as negative.  Patient was told to follow-up with orthopedics.  Patient states since her injury her pain has been around the same level.  Denies numbness/tingling.  Patient states that she does feel instability.  Patient has a long history of lumbar spine pain, history of diffuse disc herniations lumbar spine, has had MRI in the past.

## 2023-02-06 ENCOUNTER — APPOINTMENT (OUTPATIENT)
Dept: ORTHOPEDIC SURGERY | Facility: CLINIC | Age: 68
End: 2023-02-06

## 2023-02-06 NOTE — ED ADULT TRIAGE NOTE - HEIGHT IN CM
[Irregular Menstrual Interval] : irregular menstrual interval [___ Days] : [unfilled] days [Staining] : staining [FreeTextEntry1] : here for her annual exam.\par Has dark, thick blood per vagina every 20 - 25 days , no cramps......almost black, tar\par Hx of endometrial ablation 157.48

## 2023-02-20 NOTE — ED PROVIDER NOTE - CROS ED NEURO ALL NEG
Continued Stay SW/CM Assessment/Plan of Care Note       Active Substitute Decision Maker (SDM)    There are no active Substitute Decision Maker (SDM) on file.           Progress note:  Met with patient to discuss recommendation for home health services. Pt states she is current with home health. Referral sent to [facility]. Discussed possible need for home oxygen. Patient states understanding.     Addendum 1140:   Pt qualified for home O2. Referral sent to [facility]. MD notified of need for orders.    See SW/CM flowsheets for other objective data.    Disposition Recommendations:  Preliminary discharge destination: Planned Discharge Destination: Home/apartment with Services/Support  SW/CM recommendation for discharge: Home therapy, Home care    Discharge Plan/Needs:     Continued Care and Services - Admitted Since 2/12/2023     Durable Medical Equipment     Service Provider Request Status Selected Services Address Phone Fax    [facility] Pending - No Request Sent N/A [address] [address] [phone]          Home Medical Care     Service Provider Request Status Selected Services Address Phone Fax    [employer] [facility] Pending - No Request Sent N/A [address] [facility] [phone] -- --                  Devices/ Equipment that need to be arranged for discharge: Home oxygen   Accepted   Pending insurance authorization   Others:    Anticipated date of DME availability:    Prior To Hospitalization:    Living Situation: Adult children and residing at House.    Support Systems: Children   Home Devices/Equipment: Mobility assist device   Mobility Assist Devices: Standard walker, Gait belt   Type of Service Prior to Hospitalization: None     Patient/Family discharge goal (s):  Home Care, Home therapy     Resources provided:           Therapy Recommendations for Discharge:   PT:   Recommendation for Discharge: PT IL: Patient requires 24 HOUR assistance  to perform mobility and/or ADLs safely, Patient is appropriate for Physical Therapy 1-3 times per week  OT:       SLP:      Mobility Equipment Recommended for Discharge: Minnie THOMPSON, manual w/c      Barriers to Discharge  Identified Barriers to Discharge/Transition Planning: Medical necessity for acute care                 negative...

## 2023-03-10 ENCOUNTER — APPOINTMENT (OUTPATIENT)
Dept: ORTHOPEDIC SURGERY | Facility: CLINIC | Age: 68
End: 2023-03-10

## 2023-03-17 ENCOUNTER — APPOINTMENT (OUTPATIENT)
Dept: ORTHOPEDIC SURGERY | Facility: CLINIC | Age: 68
End: 2023-03-17

## 2023-07-17 ENCOUNTER — OUTPATIENT (OUTPATIENT)
Dept: OUTPATIENT SERVICES | Facility: HOSPITAL | Age: 68
LOS: 1 days | End: 2023-07-17
Payer: MEDICARE

## 2023-07-17 DIAGNOSIS — M25.519 PAIN IN UNSPECIFIED SHOULDER: ICD-10-CM

## 2023-07-17 PROCEDURE — 73030 X-RAY EXAM OF SHOULDER: CPT | Mod: RT

## 2023-07-17 PROCEDURE — 73030 X-RAY EXAM OF SHOULDER: CPT | Mod: 26,RT

## 2023-07-18 DIAGNOSIS — M25.519 PAIN IN UNSPECIFIED SHOULDER: ICD-10-CM

## 2023-08-07 ENCOUNTER — EMERGENCY (EMERGENCY)
Facility: HOSPITAL | Age: 68
LOS: 0 days | Discharge: HOME | End: 2023-08-08
Attending: EMERGENCY MEDICINE
Payer: MEDICARE

## 2023-08-07 VITALS
SYSTOLIC BLOOD PRESSURE: 176 MMHG | OXYGEN SATURATION: 98 % | HEART RATE: 97 BPM | DIASTOLIC BLOOD PRESSURE: 89 MMHG | HEIGHT: 62 IN | WEIGHT: 136.91 LBS | TEMPERATURE: 97 F | RESPIRATION RATE: 22 BRPM

## 2023-08-07 DIAGNOSIS — R51.9 HEADACHE, UNSPECIFIED: ICD-10-CM

## 2023-08-07 DIAGNOSIS — I10 ESSENTIAL (PRIMARY) HYPERTENSION: ICD-10-CM

## 2023-08-07 DIAGNOSIS — E78.5 HYPERLIPIDEMIA, UNSPECIFIED: ICD-10-CM

## 2023-08-07 DIAGNOSIS — K44.9 DIAPHRAGMATIC HERNIA WITHOUT OBSTRUCTION OR GANGRENE: ICD-10-CM

## 2023-08-07 DIAGNOSIS — Z88.1 ALLERGY STATUS TO OTHER ANTIBIOTIC AGENTS STATUS: ICD-10-CM

## 2023-08-07 DIAGNOSIS — E03.9 HYPOTHYROIDISM, UNSPECIFIED: ICD-10-CM

## 2023-08-07 DIAGNOSIS — Z87.19 PERSONAL HISTORY OF OTHER DISEASES OF THE DIGESTIVE SYSTEM: ICD-10-CM

## 2023-08-07 DIAGNOSIS — Z87.448 PERSONAL HISTORY OF OTHER DISEASES OF URINARY SYSTEM: ICD-10-CM

## 2023-08-07 DIAGNOSIS — R11.2 NAUSEA WITH VOMITING, UNSPECIFIED: ICD-10-CM

## 2023-08-07 DIAGNOSIS — E11.9 TYPE 2 DIABETES MELLITUS WITHOUT COMPLICATIONS: ICD-10-CM

## 2023-08-07 DIAGNOSIS — Z85.3 PERSONAL HISTORY OF MALIGNANT NEOPLASM OF BREAST: ICD-10-CM

## 2023-08-07 DIAGNOSIS — Z86.2 PERSONAL HISTORY OF DISEASES OF THE BLOOD AND BLOOD-FORMING ORGANS AND CERTAIN DISORDERS INVOLVING THE IMMUNE MECHANISM: ICD-10-CM

## 2023-08-07 DIAGNOSIS — Z90.710 ACQUIRED ABSENCE OF BOTH CERVIX AND UTERUS: ICD-10-CM

## 2023-08-07 DIAGNOSIS — R10.13 EPIGASTRIC PAIN: ICD-10-CM

## 2023-08-07 DIAGNOSIS — Z87.891 PERSONAL HISTORY OF NICOTINE DEPENDENCE: ICD-10-CM

## 2023-08-07 DIAGNOSIS — Z90.49 ACQUIRED ABSENCE OF OTHER SPECIFIED PARTS OF DIGESTIVE TRACT: ICD-10-CM

## 2023-08-07 LAB
ALBUMIN SERPL ELPH-MCNC: 5.3 G/DL — HIGH (ref 3.5–5.2)
ALP SERPL-CCNC: 69 U/L — SIGNIFICANT CHANGE UP (ref 30–115)
ALT FLD-CCNC: 16 U/L — SIGNIFICANT CHANGE UP (ref 0–41)
ANION GAP SERPL CALC-SCNC: 14 MMOL/L — SIGNIFICANT CHANGE UP (ref 7–14)
APPEARANCE UR: CLEAR — SIGNIFICANT CHANGE UP
AST SERPL-CCNC: 23 U/L — SIGNIFICANT CHANGE UP (ref 0–41)
BASOPHILS # BLD AUTO: 0.07 K/UL — SIGNIFICANT CHANGE UP (ref 0–0.2)
BASOPHILS NFR BLD AUTO: 0.9 % — SIGNIFICANT CHANGE UP (ref 0–1)
BILIRUB SERPL-MCNC: 0.5 MG/DL — SIGNIFICANT CHANGE UP (ref 0.2–1.2)
BILIRUB UR-MCNC: NEGATIVE — SIGNIFICANT CHANGE UP
BUN SERPL-MCNC: 16 MG/DL — SIGNIFICANT CHANGE UP (ref 10–20)
CALCIUM SERPL-MCNC: 10.2 MG/DL — SIGNIFICANT CHANGE UP (ref 8.4–10.5)
CHLORIDE SERPL-SCNC: 104 MMOL/L — SIGNIFICANT CHANGE UP (ref 98–110)
CO2 SERPL-SCNC: 25 MMOL/L — SIGNIFICANT CHANGE UP (ref 17–32)
COLOR SPEC: YELLOW — SIGNIFICANT CHANGE UP
CREAT SERPL-MCNC: 0.7 MG/DL — SIGNIFICANT CHANGE UP (ref 0.7–1.5)
DIFF PNL FLD: ABNORMAL
EGFR: 96 ML/MIN/1.73M2 — SIGNIFICANT CHANGE UP
EOSINOPHIL # BLD AUTO: 0.22 K/UL — SIGNIFICANT CHANGE UP (ref 0–0.7)
EOSINOPHIL NFR BLD AUTO: 2.9 % — SIGNIFICANT CHANGE UP (ref 0–8)
GLUCOSE SERPL-MCNC: 108 MG/DL — HIGH (ref 70–99)
GLUCOSE UR QL: NEGATIVE MG/DL — SIGNIFICANT CHANGE UP
HCT VFR BLD CALC: 34.3 % — LOW (ref 37–47)
HGB BLD-MCNC: 11.3 G/DL — LOW (ref 12–16)
IMM GRANULOCYTES NFR BLD AUTO: 0.5 % — HIGH (ref 0.1–0.3)
KETONES UR-MCNC: NEGATIVE MG/DL — SIGNIFICANT CHANGE UP
LACTATE SERPL-SCNC: 1.5 MMOL/L — SIGNIFICANT CHANGE UP (ref 0.7–2)
LEUKOCYTE ESTERASE UR-ACNC: NEGATIVE — SIGNIFICANT CHANGE UP
LIDOCAIN IGE QN: 45 U/L — SIGNIFICANT CHANGE UP (ref 7–60)
LYMPHOCYTES # BLD AUTO: 2.08 K/UL — SIGNIFICANT CHANGE UP (ref 1.2–3.4)
LYMPHOCYTES # BLD AUTO: 27.1 % — SIGNIFICANT CHANGE UP (ref 20.5–51.1)
MCHC RBC-ENTMCNC: 27.8 PG — SIGNIFICANT CHANGE UP (ref 27–31)
MCHC RBC-ENTMCNC: 32.9 G/DL — SIGNIFICANT CHANGE UP (ref 32–37)
MCV RBC AUTO: 84.3 FL — SIGNIFICANT CHANGE UP (ref 81–99)
MONOCYTES # BLD AUTO: 0.58 K/UL — SIGNIFICANT CHANGE UP (ref 0.1–0.6)
MONOCYTES NFR BLD AUTO: 7.6 % — SIGNIFICANT CHANGE UP (ref 1.7–9.3)
NEUTROPHILS # BLD AUTO: 4.69 K/UL — SIGNIFICANT CHANGE UP (ref 1.4–6.5)
NEUTROPHILS NFR BLD AUTO: 61 % — SIGNIFICANT CHANGE UP (ref 42.2–75.2)
NITRITE UR-MCNC: NEGATIVE — SIGNIFICANT CHANGE UP
NRBC # BLD: 0 /100 WBCS — SIGNIFICANT CHANGE UP (ref 0–0)
PH UR: 7 — SIGNIFICANT CHANGE UP (ref 5–8)
PLATELET # BLD AUTO: 240 K/UL — SIGNIFICANT CHANGE UP (ref 130–400)
PMV BLD: 11.7 FL — HIGH (ref 7.4–10.4)
POTASSIUM SERPL-MCNC: 4.2 MMOL/L — SIGNIFICANT CHANGE UP (ref 3.5–5)
POTASSIUM SERPL-SCNC: 4.2 MMOL/L — SIGNIFICANT CHANGE UP (ref 3.5–5)
PROT SERPL-MCNC: 7.7 G/DL — SIGNIFICANT CHANGE UP (ref 6–8)
PROT UR-MCNC: 30 MG/DL
RBC # BLD: 4.07 M/UL — LOW (ref 4.2–5.4)
RBC # FLD: 14.2 % — SIGNIFICANT CHANGE UP (ref 11.5–14.5)
SODIUM SERPL-SCNC: 143 MMOL/L — SIGNIFICANT CHANGE UP (ref 135–146)
SP GR SPEC: 1.01 — SIGNIFICANT CHANGE UP (ref 1–1.03)
UROBILINOGEN FLD QL: 1 MG/DL — SIGNIFICANT CHANGE UP (ref 0.2–1)
WBC # BLD: 7.68 K/UL — SIGNIFICANT CHANGE UP (ref 4.8–10.8)
WBC # FLD AUTO: 7.68 K/UL — SIGNIFICANT CHANGE UP (ref 4.8–10.8)

## 2023-08-07 PROCEDURE — 99284 EMERGENCY DEPT VISIT MOD MDM: CPT | Mod: 25

## 2023-08-07 PROCEDURE — 99285 EMERGENCY DEPT VISIT HI MDM: CPT

## 2023-08-07 PROCEDURE — 74177 CT ABD & PELVIS W/CONTRAST: CPT | Mod: 26,MA

## 2023-08-07 PROCEDURE — 96376 TX/PRO/DX INJ SAME DRUG ADON: CPT

## 2023-08-07 PROCEDURE — 96374 THER/PROPH/DIAG INJ IV PUSH: CPT | Mod: XU

## 2023-08-07 PROCEDURE — 83605 ASSAY OF LACTIC ACID: CPT

## 2023-08-07 PROCEDURE — 87086 URINE CULTURE/COLONY COUNT: CPT

## 2023-08-07 PROCEDURE — 83690 ASSAY OF LIPASE: CPT

## 2023-08-07 PROCEDURE — 74177 CT ABD & PELVIS W/CONTRAST: CPT | Mod: MA

## 2023-08-07 PROCEDURE — 96375 TX/PRO/DX INJ NEW DRUG ADDON: CPT

## 2023-08-07 PROCEDURE — 36415 COLL VENOUS BLD VENIPUNCTURE: CPT

## 2023-08-07 PROCEDURE — 81001 URINALYSIS AUTO W/SCOPE: CPT

## 2023-08-07 PROCEDURE — C9113: CPT

## 2023-08-07 PROCEDURE — 80053 COMPREHEN METABOLIC PANEL: CPT

## 2023-08-07 PROCEDURE — 85025 COMPLETE CBC W/AUTO DIFF WBC: CPT

## 2023-08-07 RX ORDER — SODIUM CHLORIDE 9 MG/ML
1500 INJECTION, SOLUTION INTRAVENOUS ONCE
Refills: 0 | Status: COMPLETED | OUTPATIENT
Start: 2023-08-07 | End: 2023-08-07

## 2023-08-07 RX ORDER — ONDANSETRON 8 MG/1
4 TABLET, FILM COATED ORAL ONCE
Refills: 0 | Status: COMPLETED | OUTPATIENT
Start: 2023-08-07 | End: 2023-08-07

## 2023-08-07 RX ORDER — MORPHINE SULFATE 50 MG/1
4 CAPSULE, EXTENDED RELEASE ORAL ONCE
Refills: 0 | Status: DISCONTINUED | OUTPATIENT
Start: 2023-08-07 | End: 2023-08-07

## 2023-08-07 RX ADMIN — MORPHINE SULFATE 4 MILLIGRAM(S): 50 CAPSULE, EXTENDED RELEASE ORAL at 21:03

## 2023-08-07 RX ADMIN — SODIUM CHLORIDE 1500 MILLILITER(S): 9 INJECTION, SOLUTION INTRAVENOUS at 21:06

## 2023-08-07 RX ADMIN — ONDANSETRON 4 MILLIGRAM(S): 8 TABLET, FILM COATED ORAL at 21:03

## 2023-08-07 NOTE — ED PROVIDER NOTE - CARE PLAN
Principal Discharge DX:	Hiatal hernia  Secondary Diagnosis:	Abdominal pain  Secondary Diagnosis:	Nausea and vomiting   1

## 2023-08-07 NOTE — ED ADULT NURSE NOTE - OBJECTIVE STATEMENT
Pt with C/O B/L upper abdomen pain ,N/V/D on and off for 2 weeks with C/V/D weakness ,denies fever denies chest pain or SOB .

## 2023-08-07 NOTE — ED ADULT TRIAGE NOTE - CHIEF COMPLAINT QUOTE
"The left side of my stomach hurts for about 2 weeks now. I vomit every now and then. I had diarrhea 2 days ago."

## 2023-08-07 NOTE — ED PROVIDER NOTE - CARE PROVIDER_API CALL
Hector Fernandez  Gastroenterology  [address] [place] [id number]  Phone: [phone]  Fax: [phone]  Follow Up Time: 7-10 Days

## 2023-08-07 NOTE — ED PROVIDER NOTE - OBJECTIVE STATEMENT
67-year-old female with a past medical history of hypothyroidism, diabetes, hypertension, hyperlipidemia, history of breast cancer in remission, and iron deficiency anemia (she is supposed to start iron infusions on Thursday), history of fatty liver, history of cholecystectomy, history of total abdominal hysterectomy and history of overactive bladder presents to the ED for evaluation of epigastric abdominal pain for 2 weeks that worsened today associated with worsening nausea and vomiting that began today.  Patient reports fatigue and headache.  Patient reports she is a former smoker patient reports history of colonoscopy a few years ago which showed diverticulosis.  Patient denies fever, chills, chest pain, shortness of breath, back pain, recent trauma, recent travel, recent sick contacts, urinary symptoms, blood in the urine or stool, or rashes.

## 2023-08-07 NOTE — ED PROVIDER NOTE - PROGRESS NOTE DETAILS
EP: Case endorsed to Dr. Palm to follow-up labs, imaging, reassess and dispo. FF: I discussed lab and radiology results with pt. pt reports pain has improved. pt is tolerating po in the ed. pt advised of strict return precautions discussed at bedside. agreeable to dc. f/u with gi.

## 2023-08-07 NOTE — ED PROVIDER NOTE - DISCHARGE DATE
[facility] Therapy, a part of [facility] 42   [id number] [facility]. [facility], [facility]                                                    Physical Therapy  Daily Note     Patient Name: Dheeraj Alves  PMEK:[id number]  : 2017  [x]  Patient  Verified  Payor: Austin Harman / Plan: Gordy Sol / Product Type: HMO /    In time: 1:00pm  Out time: 2:00pm  Total Treatment Time (min): 60  Total Timed Codes (min): 60    Treatment Area: Muscle weakness [M62.81]  Lack of coordination [R27.9]    Visit Type:  [] Intensive   [x] Outpatient  [] Clinic:    Certification Period: 11/3/21-11/3/22    SUBJECTIVE    Pain Level Before Treatment: [x] FLACC (If applicable, see box) score:     Start of Session  During Activities End of Session    Face  0 0 0   Legs  0 0 0   Activity  0 0 0   Cry  0 0 0   Consolability  0 0 0   Total  0 0 0       Any medication changes, allergies to medications, adverse drug reactions, diagnosis change, or new procedure performed?: [x] No    [] Yes (see summary sheet for update)  Subjective functional status/changes:   [x] No changes reported    Omid arrived to PT with Mom who was present and interactive during the session. Mom reported that Octavio Finch was doing well. Mom reported that Omid saw Dr. Jaja Saul from allergy. She reported that additional food testing was not performed; however, some environmental allergies were noted. Mom reported that allergist did not feel that Omid was allergic to current formula; however, instead possibly she was having a difficult time breaking this down. Mom trialed again the [facility] formula with Omid have significant GI issues.   Mom will follow back up with GI.      OBJECTIVE     min Therapeutic Exercise:  [] See flow sheet :   Rationale: increase ROM, increase strength, improve coordination, improve balance and increase proprioception to improve the patients ability to achieve their functional goals       60 min Neuromuscular Re-education:  []  See flow sheet    Rationale: Improve muscle re-education of movement, balance, coordination, kinesthetic sense, posture, and proprioception to improve the patient's ability to achieve their functional goals     min Manual Therapy:  See flowsheet   Rationale: decrease pain, increase ROM, increase tissue extensibility, decrease trigger points and increase postural awareness to work towards their functional goals      min Gait Training:        min Therapeutic Activities: See Flowsheet   Rationale: to use dynamic activity to improve functional performance and transfers  nale        With   [] TE   [] neuro   [x] other: throughout the session Patient Education: [x] Review HEP. Mom to continue to work on transitions up to sitting and sitting balance activities. [] Progressed/Changed HEP based on:   [] positioning   [] body mechanics   [] transfers   [] heat/ice application  [x]  Reviewed session with caregiver during the session    [] other: proper use of Zing stander-- donated to family     Objective/Functional Measures    Vestibular Input - Red swing, moving in all directions x1min per plane of movement for a total of 6 mins. Omid kept attempting to sit fully up and will trial square swing with [employer] seat. Reflex Integration/RMTI-hold -LE embrace squeezes x 2  -  Foot tendon guard x 3     Mat Activities -  See transitional skills   Sitting activities - sitting balance with sand bags at each hip. Using sensory vibration bracelets on UEs for loading UEs and weight bearing through hands. Omid often will scoot while in sitting. Providing input through LEs for stability and having her weight bear through her UE on sand bags with Omid maintaining sitting for 2-5 seconds and then LT to min A for balance corrections. At times increased lowering forward or excessive extension. However, working on maintaining midrange.  With sitting activities,  use of \"X\" on back of SPIO and then bilateral stays for more upright posture.    -Prone through runners stretch to side sitting 2 through faith side with abdominal cues to initiate flexors and shift back. Improved weight shifts to achieve this position noted and then Min A for pushing up through UEs and to maintain controlled transition back to sitting as opposed to just trunk extension. Min A with periods of blocking to lower back to prone position and not just drop down. Omid demonstrated improved use of UEs through transition today. Twice during session, Noemi Parker transitioned from prone to LEs under her, back over her right hip and began pushing up on her arms, with min A needed to complete and for balance. Twice Omid also transitioned from prone to LEs under her and straight back, with starting to push up on her arms and again A needed to completed for balance and safety   Quad/Crawling    Tall Kneel Activities    Transitional Activities -Supine to sit transitions through forearm and guidance to weight shift through diagonals, to then push forward. Performed x multiple repetitions. Standing Activities  Standing on [employer] Exercise Unit    Gigi -Sitting  18 hz x 1 minute  X 2 sets with input to low back for more upright posture, moving between anterior/posterior tilts  -standing 18 hz x 1 minute x 3 sets with tech blocking LEs and working on maintain trunk and hip extension. Omid would maintain standing for 20-25 seconds at a time prior to dropping trunk forward. Increased cues and at a times assistance for head upright. Gait Training    OTHER - Assisted sit ups x 10     Patient's tolerance to therapy:  [x]  good  []  fair  [] increased fatigue  [] other:      ASSESSMENT/Changes in Function:  Omid was seen for 60 minutes to continue with outpatient POC. Omid wore her SPIO throughout session. Omid tolerated vestibular input; however, this swing is slightly reclined and Omid wanted to sit more upright.   Will trial rectangle swing with [employer] seat in future session. Omid did excellent with transitions to sitting today. Performed through runners stretch and Octavio Finch is demonstrating improved pushing up through UEs and overall activation of core muscles. Omid multiple times today, transitioned from prone to LEs under her and initiated sitting up through right hip and straight back. Omid was able to push up through ~25% of the transition and then needed CGA to min A to guide and complete. Octavio Finch has difficulty with static sitting and demonstrates fluctuating movements between flexion and extension. However today she did demonstrate improved periods of sitting with decreased assistance with use of sand bags for stability and use of sensory input to UEs. Providing at time input to back of head, trunk, top of head to load, and shoulder regions. Omid does well in the SPIO. She demonstrates improvement with use of stays. Octavio Finch had a great session. Mom to continue to work on transitions and static sitting at home. Patient tolerated treatment well. Continue per POC. Patient will benefit from skilled PT services to modify and progress therapeutic interventions, address functional mobility deficits, address ROM deficits, address strength deficits, analyze and address soft tissue restrictions, analyze and cue movement patterns, analyze and modify body mechanics/ergonomics, assess and modify postural abnormalities and instruct in home and community integration to attain remaining goals.      [x]  See Plan of Care  []  See progress note/recertification  []  See Discharge Summary         Progress towards goals / Updated goals: [x]  Continues to work on goals on a daily basis    Long Term Goals:  (11/3/21-11/3/22)  Omid will demonstrate improved total body strength, head control, balance, sustained activity tolerance, motor control and coordination in order to demonstrate more age appropriate gross motor skills and maximize her independence and safety with all functional mobility within her home and community.  PROGRESSING      Short Term Goals:   Omid will:        Maintain her head upright while in prone prop with her elbows supported to maintain this position, for at least 10 seconds, as seen in 2/3 trials. Date assessed: 11/3/21  Partially Met: Ilan Dowling can hold head up for 2-5s max while in prone prop. 10/26/20- 3/30/22   Maintain sitting balance with min A, without forward flexion or pushing backwards, for at least 15 seconds, as been in 2/3 trials, to improve sitting balance to engage with her environment. Date assessed: 1/26/22  Progressing. With PT support at her pelvis, maintains her head/trunk upright for 6-12 sec prior to forward flexing 10/26/20- 3/30/22   Transition to sit through either side with min A and [place] A to maintain her hand down to push through, as seen in 2/3 trials, to improve ability to assist with transitional skills. Date assessed: 1/26/21  Status: Progressing  Omid requires mostly min A with periodic mod A, working on consistency of skill. 10/26/20- 3/30/22   Commando crawl forward along a mat surface with a surface provided to push her leg off of and Omid actively pulling herself forward with her UEs x5ft, as seen in 2/3 trials, in order to improve functional mobility throughout her environment. Date assessed: 11/3/21  Status: Progressing  Omid requires AA for LE management and to provide a surface to push off of, and decreased UE pulling noted, with difficulty maintaining head upright for prolonged periods of time 10/26/20- 3/30/22   Take 5 consecutive steps forward with the most appropriate assistive device, actively advancing each LE and grounding her foot upon contact with the ground, as seen in 2/3 trials.  Date assessed: 11/3/21  Progressing- able to step forward, however requires A to ground each foot/LE in stance, though this is greatly improving 1/21/21-3/30/22 Maintain LE extension in supported standing for at least 1 minute, as seen in 3/5 trials.  NEW GOAL 11/3/21-3/31/22         PLAN  [x]  Upgrade activities as tolerated     [x]  Continue plan of care  []  Update interventions per flow sheet       []  Discharge due to:_  []  Other:_        Miles Donohue, PT 2/3/2022 08-Aug-2023

## 2023-08-07 NOTE — ED PROVIDER NOTE - CLINICAL SUMMARY MEDICAL DECISION MAKING FREE TEXT BOX
67-year-old female with a past medical history of hypothyroidism, diabetes, hypertension, hyperlipidemia, history of breast cancer in remission, and iron deficiency anemia (she is supposed to start iron infusions on Thursday), history of fatty liver, history of cholecystectomy, history of total abdominal hysterectomy and history of overactive bladder presents to the ED for evaluation of epigastric abdominal pain for 2 weeks that worsened today associated with worsening nausea and vomiting that began today. Endorsed to me by Dr Caraballo to follow up CT scan of abdomen/pelvis. Pt's labs ok. CT showed HH, otherwise no acute disease. Pt given antinausea meds, pain meds, and acid blockers with improvement. To dc home with scripts for pepcid/zofran/protonix/carafate and recommend outpatient GI follow up. Return precautions given.

## 2023-08-07 NOTE — ED PROVIDER NOTE - PATIENT PORTAL LINK FT
You can access the FollowMyHealth Patient Portal offered by [facility] by registering at the following website: [email or web address]. By joining [facility]’s FollowMyHealth portal, you will also be able to view your health information using other applications (apps) compatible with our system.

## 2023-08-07 NOTE — ED PROVIDER NOTE - ATTENDING APP SHARED VISIT CONTRIBUTION OF CARE
67-year-old female PMH as noted Presenting for evaluation left-sided abdominal pain for the past 2 weeks associated with nausea, vomiting and inability to tolerate p.o.  No fever chills, chest pain or shortness of breath, no black or bloody stools, no urinary complaints. Patient resting in stretcher in no acute distress, PERRL, dry oral mucosa, speaking full sentences, lungs clear to auscultation, RRR, well-perfused extremities, abdomen is nondistended with left upper quadrant tenderness to palpation, no lower leg edema or calf tenderness to palpation, patient awake alert x3, no gross neurodeficits.  Plan: Analgesia, hydration, CT abdomen pelvis, labs, reassess.  Patient is amenable to plan.

## 2023-08-07 NOTE — ED PROVIDER NOTE - PHYSICAL EXAMINATION
Physical Exam    Vital Signs: I have reviewed the initial vital signs.  Constitutional: well-nourished, appears stated age, no acute distress  Eyes: Conjunctiva pink, Sclera clear  Cardiovascular: S1 and S2, regular rate, regular rhythm, well-perfused extremities, radial pulses equal and 2+ b/l.   Respiratory: unlabored respiratory effort, clear to auscultation bilaterally no wheezing, rales and rhonchi. pt is speaking full sentences. no accessory muscle use.   Gastrointestinal: soft, (+) epigastric abdominal tenderness, nondistended abdomen, no pulsatile mass, normal bowl sounds, no rebound, no guarding, negative murphys.   Musculoskeletal: FROM of b/l upper and lower extremities.   Integumentary: warm, dry, no rash  Neurologic: awake, alert  Psychiatric: appropriate mood, appropriate affect

## 2023-08-08 VITALS
SYSTOLIC BLOOD PRESSURE: 160 MMHG | OXYGEN SATURATION: 95 % | RESPIRATION RATE: 18 BRPM | DIASTOLIC BLOOD PRESSURE: 84 MMHG | HEART RATE: 66 BPM

## 2023-08-08 RX ORDER — FAMOTIDINE 10 MG/ML
20 INJECTION INTRAVENOUS ONCE
Refills: 0 | Status: COMPLETED | OUTPATIENT
Start: 2023-08-08 | End: 2023-08-08

## 2023-08-08 RX ORDER — SUCRALFATE 1 G
10 TABLET ORAL
Qty: 210 | Refills: 0
Start: 2023-08-08 | End: 2023-08-14

## 2023-08-08 RX ORDER — FAMOTIDINE 10 MG/ML
1 INJECTION INTRAVENOUS
Qty: 14 | Refills: 0
Start: 2023-08-08 | End: 2023-08-14

## 2023-08-08 RX ORDER — PANTOPRAZOLE SODIUM 20 MG/1
1 TABLET, DELAYED RELEASE ORAL
Qty: 7 | Refills: 0
Start: 2023-08-08 | End: 2023-08-14

## 2023-08-08 RX ORDER — PANTOPRAZOLE SODIUM 20 MG/1
40 TABLET, DELAYED RELEASE ORAL ONCE
Refills: 0 | Status: COMPLETED | OUTPATIENT
Start: 2023-08-08 | End: 2023-08-08

## 2023-08-08 RX ORDER — SUCRALFATE 1 G
1 TABLET ORAL ONCE
Refills: 0 | Status: DISCONTINUED | OUTPATIENT
Start: 2023-08-08 | End: 2023-08-08

## 2023-08-08 RX ORDER — ONDANSETRON 8 MG/1
1 TABLET, FILM COATED ORAL
Qty: 9 | Refills: 0
Start: 2023-08-08 | End: 2023-08-10

## 2023-08-08 RX ORDER — MORPHINE SULFATE 50 MG/1
6 CAPSULE, EXTENDED RELEASE ORAL ONCE
Refills: 0 | Status: DISCONTINUED | OUTPATIENT
Start: 2023-08-08 | End: 2023-08-08

## 2023-08-08 RX ADMIN — MORPHINE SULFATE 6 MILLIGRAM(S): 50 CAPSULE, EXTENDED RELEASE ORAL at 00:20

## 2023-08-08 RX ADMIN — FAMOTIDINE 100 MILLIGRAM(S): 10 INJECTION INTRAVENOUS at 02:20

## 2023-08-08 RX ADMIN — PANTOPRAZOLE SODIUM 40 MILLIGRAM(S): 20 TABLET, DELAYED RELEASE ORAL at 02:22

## 2023-08-08 RX ADMIN — MORPHINE SULFATE 6 MILLIGRAM(S): 50 CAPSULE, EXTENDED RELEASE ORAL at 02:24

## 2023-08-09 LAB
CULTURE RESULTS: SIGNIFICANT CHANGE UP
SPECIMEN SOURCE: SIGNIFICANT CHANGE UP

## 2023-08-21 ENCOUNTER — OUTPATIENT (OUTPATIENT)
Dept: OUTPATIENT SERVICES | Facility: HOSPITAL | Age: 68
LOS: 1 days | End: 2023-08-21
Payer: MEDICARE

## 2023-08-21 DIAGNOSIS — Z12.31 ENCOUNTER FOR SCREENING MAMMOGRAM FOR MALIGNANT NEOPLASM OF BREAST: ICD-10-CM

## 2023-08-21 PROCEDURE — 77063 BREAST TOMOSYNTHESIS BI: CPT

## 2023-08-21 PROCEDURE — 77067 SCR MAMMO BI INCL CAD: CPT

## 2023-08-21 PROCEDURE — 77063 BREAST TOMOSYNTHESIS BI: CPT | Mod: 26

## 2023-08-21 PROCEDURE — 77067 SCR MAMMO BI INCL CAD: CPT | Mod: 26

## 2023-08-22 DIAGNOSIS — Z12.31 ENCOUNTER FOR SCREENING MAMMOGRAM FOR MALIGNANT NEOPLASM OF BREAST: ICD-10-CM

## 2023-08-25 ENCOUNTER — APPOINTMENT (OUTPATIENT)
Dept: SURGERY | Facility: CLINIC | Age: 68
End: 2023-08-25
Payer: MEDICARE

## 2023-08-25 VITALS
OXYGEN SATURATION: 95 % | DIASTOLIC BLOOD PRESSURE: 80 MMHG | HEART RATE: 87 BPM | SYSTOLIC BLOOD PRESSURE: 146 MMHG | WEIGHT: 136 LBS | BODY MASS INDEX: 25.03 KG/M2 | HEIGHT: 62 IN | TEMPERATURE: 97.3 F

## 2023-08-25 DIAGNOSIS — E03.9 HYPOTHYROIDISM, UNSPECIFIED: ICD-10-CM

## 2023-08-25 DIAGNOSIS — E11.9 TYPE 2 DIABETES MELLITUS W/OUT COMPLICATIONS: ICD-10-CM

## 2023-08-25 PROCEDURE — 99204 OFFICE O/P NEW MOD 45 MIN: CPT

## 2023-08-25 NOTE — HISTORY OF PRESENT ILLNESS
[de-identified] : Ms. RODO LI is a 68 year  year old woman. She presented to the office for the first time on 08/25/2023 , her primary is michelle bledsoe  [phone]  multiple medical co morbdidite recently moved from [place] gi doctor on islan  allergy to abx - rubina johns - keflex and batrim  no issue with amoxiclin   h/o breast cance r- surgery + rad h/o open delma and lap hysterecomty   low iron gets weekly infusion  atovastatin  amlodipine levothryoxi metformin  ferrous gluc cycybenzaprine for muscle spasm  h/o gerd - now less than once a week.  has bad migranes/ headache/.

## 2023-08-25 NOTE — ASSESSMENT
[FreeTextEntry1] : Ms. RODO LI is a 68 year  year old woman. She presented to the office for the first time on 08/25/2023 , her primary is michelle moralesr  [phone]  multiple medical co morbdidite recently moved from [place] gi doctor on islan  allergy to abx - rubina johns - keflex and batrim  no issue with amoxiclin   h/o breast cance r- surgery + rad h/o open delma and lap hysterecomty   low iron gets weekly infusion  atovastatin  amlodipine levothryoxi metformin  ferrous gluc cycybenzaprine for muscle spasm  h/o gerd - now less than once a week.  has bad migranes/ headache/.   impression  - luq pain  ??" unknown etiology  normal ct with hiatal hernia sliding will get ugi / follow will get gi for egd /manometry and colonosocy will get stool for h pylori  will continue ppi   We explained in great detail the pathophysiology of the disease process. We beckie diagrams and discussed the workup for diagnosis and management. The various options were explained to the patient. The Risk , benefit and alternatives were discussed. We discussed recovery and possible complications. The Post operative care was explained to the patient. She was counselled on diet , exercise and wound care. We discussed the pathology and surgery with her.  We discussed for over 45  min .  We discussed the importance of close follow up.  We informed that she needs to follow up in 1-2 week . We also informed that she can call us if anything changes or has any questions.

## 2023-08-25 NOTE — PHYSICAL EXAM
[JVD] : no jugular venous distention  [Purpura] : no purpura  [Alert] : alert [Calm] : calm [de-identified] : normal  [de-identified] : normal  [de-identified] : normal  [de-identified] : normal

## 2023-09-01 ENCOUNTER — NON-APPOINTMENT (OUTPATIENT)
Age: 68
End: 2023-09-01

## 2023-09-08 ENCOUNTER — OUTPATIENT (OUTPATIENT)
Dept: OUTPATIENT SERVICES | Facility: HOSPITAL | Age: 68
LOS: 1 days | End: 2023-09-08

## 2023-09-08 DIAGNOSIS — R13.10 DYSPHAGIA, UNSPECIFIED: ICD-10-CM

## 2023-09-09 DIAGNOSIS — R13.10 DYSPHAGIA, UNSPECIFIED: ICD-10-CM

## 2023-09-10 LAB — H PYLORI AG STL QL: NEGATIVE

## 2023-09-18 ENCOUNTER — APPOINTMENT (OUTPATIENT)
Dept: OBGYN | Facility: CLINIC | Age: 68
End: 2023-09-18
Payer: MEDICARE

## 2023-09-18 VITALS
DIASTOLIC BLOOD PRESSURE: 92 MMHG | SYSTOLIC BLOOD PRESSURE: 147 MMHG | BODY MASS INDEX: 25.03 KG/M2 | WEIGHT: 136 LBS | HEIGHT: 62 IN

## 2023-09-18 PROCEDURE — G0101: CPT

## 2023-09-18 NOTE — ED PROVIDER NOTE - MEDICATIONS Q1 - PARENTERAL NARCOTICS ADMINISTERED FOR MANAGEMENT OF PAIN
Chief Complaints and History of Present Illnesses   Patient presents with    Follow Up     Chief Complaint(s) and History of Present Illness(es)       Follow Up              Laterality: both eyes    Associated symptoms: tearing and floaters.  Negative for eye pain and flashes    Treatments tried: no treatments              Comments    Here for follow up. VA is about the same since last visit. Stable floaters. Some tearing. No gtts. No pain.    Lab Results       Component                Value               Date                       A1C                      8.7                 07/06/2023                 A1C                      9.1                 03/15/2023                 A1C                      7.7                 07/10/2022                 A1C                      7.6                 05/05/2022                 A1C                      8.9                 09/16/2021                 A1C                      8.8                 05/25/2021                 A1C                      8.6                 10/15/2019                 A1C                      8.3                 08/12/2019                 A1C                      8.2                 04/10/2019                 A1C                      7.5                 09/13/2018             Terry Rod COT 9:54 AM September 18, 2023                       Yes

## 2023-09-22 ENCOUNTER — OUTPATIENT (OUTPATIENT)
Dept: OUTPATIENT SERVICES | Facility: HOSPITAL | Age: 68
LOS: 1 days | End: 2023-09-22
Payer: MEDICARE

## 2023-09-22 ENCOUNTER — RESULT REVIEW (OUTPATIENT)
Age: 68
End: 2023-09-22

## 2023-09-22 DIAGNOSIS — Z00.8 ENCOUNTER FOR OTHER GENERAL EXAMINATION: ICD-10-CM

## 2023-09-22 DIAGNOSIS — R13.10 DYSPHAGIA, UNSPECIFIED: ICD-10-CM

## 2023-09-22 PROCEDURE — 74246 X-RAY XM UPR GI TRC 2CNTRST: CPT | Mod: 26

## 2023-09-22 PROCEDURE — 74246 X-RAY XM UPR GI TRC 2CNTRST: CPT

## 2023-09-23 DIAGNOSIS — R13.10 DYSPHAGIA, UNSPECIFIED: ICD-10-CM

## 2023-09-27 ENCOUNTER — APPOINTMENT (OUTPATIENT)
Dept: SURGERY | Facility: CLINIC | Age: 68
End: 2023-09-27
Payer: MEDICARE

## 2023-09-27 VITALS
DIASTOLIC BLOOD PRESSURE: 88 MMHG | TEMPERATURE: 97.2 F | WEIGHT: 257 LBS | SYSTOLIC BLOOD PRESSURE: 114 MMHG | HEIGHT: 62 IN | HEART RATE: 68 BPM | BODY MASS INDEX: 47.29 KG/M2 | OXYGEN SATURATION: 99 %

## 2023-09-27 VITALS
SYSTOLIC BLOOD PRESSURE: 150 MMHG | BODY MASS INDEX: 24.66 KG/M2 | HEART RATE: 92 BPM | HEIGHT: 62 IN | OXYGEN SATURATION: 98 % | DIASTOLIC BLOOD PRESSURE: 80 MMHG | TEMPERATURE: 97.3 F | WEIGHT: 134 LBS

## 2023-09-27 DIAGNOSIS — F41.1 GENERALIZED ANXIETY DISORDER: ICD-10-CM

## 2023-09-27 PROCEDURE — 99213 OFFICE O/P EST LOW 20 MIN: CPT

## 2023-10-23 ENCOUNTER — APPOINTMENT (OUTPATIENT)
Dept: UROGYNECOLOGY | Facility: CLINIC | Age: 68
End: 2023-10-23
Payer: MEDICARE

## 2023-10-23 VITALS
SYSTOLIC BLOOD PRESSURE: 129 MMHG | BODY MASS INDEX: 24.66 KG/M2 | DIASTOLIC BLOOD PRESSURE: 79 MMHG | WEIGHT: 134 LBS | HEART RATE: 70 BPM | HEIGHT: 62 IN

## 2023-10-23 PROCEDURE — 99215 OFFICE O/P EST HI 40 MIN: CPT

## 2023-10-23 RX ORDER — PANTOPRAZOLE 40 MG/1
40 TABLET, DELAYED RELEASE ORAL
Qty: 30 | Refills: 3 | Status: COMPLETED | COMMUNITY
Start: 2020-10-29 | End: 2023-10-23

## 2023-10-23 RX ORDER — LISINOPRIL 30 MG/1
30 TABLET ORAL
Qty: 30 | Refills: 0 | Status: COMPLETED | COMMUNITY
Start: 2020-11-16 | End: 2023-10-23

## 2023-10-23 RX ORDER — NAPROXEN 500 MG/1
500 TABLET ORAL
Qty: 60 | Refills: 0 | Status: COMPLETED | COMMUNITY
Start: 2023-01-12 | End: 2023-10-23

## 2023-10-23 RX ORDER — LANSOPRAZOLE 15 MG/1
15 CAPSULE, DELAYED RELEASE ORAL
Qty: 30 | Refills: 0 | Status: COMPLETED | COMMUNITY
Start: 2020-10-29 | End: 2023-10-23

## 2023-10-23 RX ORDER — TRAZODONE HYDROCHLORIDE 50 MG/1
50 TABLET ORAL AT BEDTIME
Qty: 60 | Refills: 2 | Status: COMPLETED | COMMUNITY
Start: 2021-05-13 | End: 2023-10-23

## 2023-10-23 RX ORDER — ZOLPIDEM TARTRATE 5 MG/1
5 TABLET ORAL
Qty: 15 | Refills: 0 | Status: COMPLETED | COMMUNITY
Start: 2021-05-13 | End: 2023-10-23

## 2023-10-23 RX ORDER — SOLIFENACIN SUCCINATE 10 MG/1
10 TABLET ORAL
Qty: 30 | Refills: 3 | Status: COMPLETED | COMMUNITY
Start: 2020-12-08 | End: 2023-10-23

## 2023-10-23 RX ORDER — ASPIRIN 81 MG/1
81 TABLET, CHEWABLE ORAL DAILY
Qty: 30 | Refills: 3 | Status: COMPLETED | COMMUNITY
Start: 2018-03-23 | End: 2023-10-23

## 2023-10-23 RX ORDER — GABAPENTIN 300 MG/1
300 CAPSULE ORAL DAILY
Qty: 30 | Refills: 2 | Status: COMPLETED | COMMUNITY
Start: 2020-09-08 | End: 2023-10-23

## 2023-10-23 RX ORDER — FLUOXETINE HYDROCHLORIDE 20 MG/1
20 CAPSULE ORAL EVERY MORNING
Qty: 90 | Refills: 2 | Status: COMPLETED | COMMUNITY
End: 2023-10-23

## 2023-10-23 RX ORDER — NITROFURANTOIN (MONOHYDRATE/MACROCRYSTALS) 25; 75 MG/1; MG/1
100 CAPSULE ORAL TWICE DAILY
Qty: 14 | Refills: 0 | Status: COMPLETED | COMMUNITY
Start: 2020-12-08 | End: 2023-10-23

## 2023-10-25 ENCOUNTER — APPOINTMENT (OUTPATIENT)
Dept: SURGERY | Facility: CLINIC | Age: 68
End: 2023-10-25

## 2023-12-08 NOTE — ED PROVIDER NOTE - EKG ADDITIONAL QUESTION - PERFORMED INDEPENDENT VISUALIZATION
Group Topic: BH Check-in/Symptom Rating    Date: 12/8/2023  Start Time: 0900  End Time: 0930  Facilitators: Fahres, Catherine S, OT    Focus: Individual Daily needs  Number in attendance: 7    Method: Individual  Attendance:  sleeping   Yes

## 2024-01-12 ENCOUNTER — OUTPATIENT (OUTPATIENT)
Dept: OUTPATIENT SERVICES | Facility: HOSPITAL | Age: 69
LOS: 1 days | End: 2024-01-12
Payer: MEDICARE

## 2024-01-12 DIAGNOSIS — Z12.31 ENCOUNTER FOR SCREENING MAMMOGRAM FOR MALIGNANT NEOPLASM OF BREAST: ICD-10-CM

## 2024-01-12 DIAGNOSIS — R92.2 INCONCLUSIVE MAMMOGRAM: ICD-10-CM

## 2024-01-12 PROCEDURE — 76641 ULTRASOUND BREAST COMPLETE: CPT | Mod: 26,50

## 2024-01-12 PROCEDURE — 76641 ULTRASOUND BREAST COMPLETE: CPT | Mod: 50

## 2024-01-13 DIAGNOSIS — R92.2 INCONCLUSIVE MAMMOGRAM: ICD-10-CM

## 2024-01-14 ENCOUNTER — NON-APPOINTMENT (OUTPATIENT)
Age: 69
End: 2024-01-14

## 2024-01-16 ENCOUNTER — APPOINTMENT (OUTPATIENT)
Dept: ORTHOPEDIC SURGERY | Facility: CLINIC | Age: 69
End: 2024-01-16
Payer: MEDICARE

## 2024-01-16 ENCOUNTER — RESULT CHARGE (OUTPATIENT)
Age: 69
End: 2024-01-16

## 2024-01-16 VITALS — WEIGHT: 137 LBS | BODY MASS INDEX: 25.21 KG/M2 | HEIGHT: 62 IN

## 2024-01-16 PROCEDURE — 99203 OFFICE O/P NEW LOW 30 MIN: CPT | Mod: 25

## 2024-01-16 PROCEDURE — 20610 DRAIN/INJ JOINT/BURSA W/O US: CPT | Mod: LT

## 2024-01-16 PROCEDURE — 73560 X-RAY EXAM OF KNEE 1 OR 2: CPT | Mod: 50

## 2024-01-16 PROCEDURE — 99213 OFFICE O/P EST LOW 20 MIN: CPT | Mod: 25

## 2024-01-16 NOTE — HISTORY OF PRESENT ILLNESS
[de-identified] : Patient is a 68-year-old female here for follow-up evaluation of bilateral knee pain.  Patient states she has been experiencing bilateral knee pain for about 1 year with no recent injury or trauma.  Patient states that she did have an injury a few years ago but did not have any pain at that time.  Patient states she has constant pain especially with ambulation activities.

## 2024-01-16 NOTE — DISCUSSION/SUMMARY
[de-identified] : Discussed with patient detail that she has degenerative changes/osteoarthritis bilateral knees.  Discussed treatment options in detail including rest, ice/heat, range of motion exercise, physical therapy, anti-inflammatory medication, Tylenol, cortisone injection.  Patient is a prediabetic, controlled.  Plan is for bilateral knee cortisone injection.  Dexamethasone and Lidocaine         Anesthesia: ethyl chloride sprayed topically. Dexamethasone 20mg per 5mL 2cc        Lidocaine 1% 2cc                  Medication was injected in the B/L knee after verbal consent using sterile preparation and technique. The risks, benefits, and alternatives to cortisone injection were explained in full to the patient. Risks outlined include but are not limited to infection, sepsis, bleeding, scarring, skin discoloration, temporary increase in pain, syncopal episode, failure to resolve symptoms, allergic reaction, symptom recurrence, and elevation of blood sugar in diabetics. Patient understood the risks. All questions were answered. After discussion of options, patient requested an injection. Oral informed consent was obtained and sterile prep was done of the injection site. Sterile technique was utilized for the procedure including the preparation of the solutions used for the injection. Patient tolerated the procedure well. Advised to ice the injection site this evening.  Patient will follow-up in 4 months for reevaluation.  Call if any questions or concerns.  Patient understands agrees with plan.

## 2024-01-16 NOTE — PHYSICAL EXAM
[Bilateral] : knee bilaterally [5___] : hamstring 5[unfilled]/5 [Negative] : negative Perla's [] : mildly antalgic [TWNoteComboBox7] : flexion 125 degrees [de-identified] : extension 0 degrees

## 2024-01-16 NOTE — DATA REVIEWED
[FreeTextEntry1] : X-ray bilateral knees: No acute fractures consultations, dislocations.  Mild to moderate tricompartmental degenerative changes.

## 2024-01-24 ENCOUNTER — APPOINTMENT (OUTPATIENT)
Dept: UROGYNECOLOGY | Facility: CLINIC | Age: 69
End: 2024-01-24
Payer: MEDICARE

## 2024-01-24 ENCOUNTER — RESULT CHARGE (OUTPATIENT)
Age: 69
End: 2024-01-24

## 2024-01-24 VITALS
WEIGHT: 139 LBS | HEIGHT: 62 IN | HEART RATE: 69 BPM | SYSTOLIC BLOOD PRESSURE: 116 MMHG | BODY MASS INDEX: 25.58 KG/M2 | DIASTOLIC BLOOD PRESSURE: 79 MMHG

## 2024-01-24 PROCEDURE — 51741 ELECTRO-UROFLOWMETRY FIRST: CPT

## 2024-01-24 PROCEDURE — 51784 ANAL/URINARY MUSCLE STUDY: CPT

## 2024-01-24 PROCEDURE — 81000 URINALYSIS NONAUTO W/SCOPE: CPT

## 2024-01-24 PROCEDURE — 51729 CYSTOMETROGRAM W/VP&UP: CPT

## 2024-01-24 PROCEDURE — 51797 INTRAABDOMINAL PRESSURE TEST: CPT

## 2024-01-24 PROCEDURE — 99213 OFFICE O/P EST LOW 20 MIN: CPT | Mod: 25

## 2024-01-24 RX ORDER — FAMOTIDINE 40 MG/1
40 TABLET, FILM COATED ORAL
Qty: 30 | Refills: 4 | Status: COMPLETED | COMMUNITY
Start: 2020-10-29 | End: 2024-01-24

## 2024-01-24 RX ORDER — ISOPROPYL ALCOHOL 70 ML/100ML
70 SWAB TOPICAL
Qty: 100 | Refills: 0 | Status: COMPLETED | COMMUNITY
Start: 2020-10-07 | End: 2024-01-24

## 2024-01-24 RX ORDER — LOSARTAN POTASSIUM 100 MG/1
100 TABLET, FILM COATED ORAL
Refills: 0 | Status: COMPLETED | COMMUNITY
End: 2024-01-24

## 2024-01-24 RX ORDER — CALCIUM CARBONATE 600 MG
1500 (600 CA) TABLET ORAL
Refills: 0 | Status: COMPLETED | COMMUNITY
End: 2024-01-24

## 2024-01-24 RX ORDER — INCONTINENCE PAD,LINER,DISP
EACH MISCELLANEOUS
Qty: 1 | Refills: 5 | Status: COMPLETED | COMMUNITY
Start: 2020-10-30 | End: 2024-01-24

## 2024-01-24 RX ORDER — LOSARTAN POTASSIUM AND HYDROCHLOROTHIAZIDE 25; 100 MG/1; MG/1
100-25 TABLET ORAL
Refills: 0 | Status: ACTIVE | COMMUNITY

## 2024-01-24 RX ORDER — FESOTERODINE FUMARATE 8 MG/1
8 TABLET, FILM COATED, EXTENDED RELEASE ORAL
Qty: 30 | Refills: 2 | Status: ACTIVE | COMMUNITY
Start: 2024-01-24 | End: 1900-01-01

## 2024-01-24 RX ORDER — OXYCODONE AND ACETAMINOPHEN 10; 325 MG/1; MG/1
10-325 TABLET ORAL
Refills: 0 | Status: COMPLETED | COMMUNITY
End: 2024-01-24

## 2024-01-24 RX ORDER — AMLODIPINE BESYLATE 5 MG/1
5 TABLET ORAL
Refills: 0 | Status: COMPLETED | COMMUNITY
End: 2024-01-24

## 2024-01-24 RX ORDER — ONDANSETRON 4 MG/1
4 TABLET, ORALLY DISINTEGRATING ORAL
Qty: 20 | Refills: 0 | Status: COMPLETED | COMMUNITY
Start: 2020-10-15 | End: 2024-01-24

## 2024-01-24 RX ORDER — BLOOD SUGAR DIAGNOSTIC
STRIP MISCELLANEOUS
Qty: 50 | Refills: 0 | Status: COMPLETED | COMMUNITY
Start: 2020-11-06 | End: 2024-01-24

## 2024-01-24 RX ORDER — VIBEGRON 75 MG/1
75 TABLET, FILM COATED ORAL
Qty: 30 | Refills: 3 | Status: COMPLETED | COMMUNITY
Start: 2023-10-23 | End: 2024-01-24

## 2024-01-24 RX ORDER — LANCETS 30 GAUGE
EACH MISCELLANEOUS
Qty: 100 | Refills: 0 | Status: COMPLETED | COMMUNITY
Start: 2020-10-21 | End: 2024-01-24

## 2024-01-24 RX ORDER — B-COMPLEX WITH VITAMIN C
500-200 TABLET ORAL
Qty: 60 | Refills: 0 | Status: COMPLETED | COMMUNITY
Start: 2020-11-16 | End: 2024-01-24

## 2024-01-26 LAB
BILIRUB UR QL STRIP: NORMAL
CLARITY UR: CLEAR
COLLECTION METHOD: NORMAL
GLUCOSE UR-MCNC: NORMAL
HCG UR QL: 1 EU/DL
HGB UR QL STRIP.AUTO: NORMAL
KETONES UR-MCNC: NORMAL
LEUKOCYTE ESTERASE UR QL STRIP: NORMAL
NITRITE UR QL STRIP: NORMAL
PH UR STRIP: 6
PROT UR STRIP-MCNC: NORMAL
SP GR UR STRIP: 1.02

## 2024-02-07 ENCOUNTER — APPOINTMENT (OUTPATIENT)
Dept: ORTHOPEDIC SURGERY | Facility: CLINIC | Age: 69
End: 2024-02-07
Payer: MEDICARE

## 2024-02-07 DIAGNOSIS — M50.30 OTHER CERVICAL DISC DEGENERATION, UNSPECIFIED CERVICAL REGION: ICD-10-CM

## 2024-02-07 PROCEDURE — 99204 OFFICE O/P NEW MOD 45 MIN: CPT

## 2024-02-07 PROCEDURE — 72083 X-RAY EXAM ENTIRE SPI 4/5 VW: CPT

## 2024-02-07 NOTE — HISTORY OF PRESENT ILLNESS
[de-identified] : 68-year-old female presents with low back pain neck pain and middle back pain.  Is been going for many years now.  In general the patient has pain everywhere in her body.  She has had injections in her spine in the past when she used to live in [place] and those injections significantly helped her.  She wants more injections.  She denies any loss of bladder or bowel.  She does have pain radiating into her arms and legs.  She has an MRI of her lumbar spine report with her.  She is doing physical therapy for her knees and would like to do some for her back as well.

## 2024-02-07 NOTE — IMAGING
[de-identified] : TTP midline cervical spine and paraspinal musculature   Strength                                                                     Deltoid   Right: 5/5; Left: 5/5                      Biceps   Right: 5/5; Left: 5/5                   Triceps        Right: 5/5; Left: 5/5                                 Wrist Extensors     Right: 5/5; Left: 5/5 Finger Flexors     Right: 5/5; Left: 5/5 IO    Right: 5/5; Left: 5/5  Sensation C5   Right: 2/2; Left: 2/2 C6   Right: 2/2; Left: 2/2 C7   Right: 2/2; Left: 2/2 C8   Right: 2/2; Left: 2/2 T1   Right: 2/2; Left: 2/2  Reflexes Biceps   Right: 2+; Left 2+ Triceps   Right: 2+; Left 2+ Núñez's  Right: Negative; L: Negative  TTP midline spine and paraspinal musculature  Strength                                          Hip flexor   Right: 5/5; Left: 5/5                              Knee extensor     Right: 5/5; Left: 5/5                      Ankle dorsiflexion   Right: 5/5; Left: 5/5                   EHL           Right: 5/5; Left: 5/5                                 Ankle plantarflexion       Right: 5/5; Left: 5/5  Sensation L1   Right: 2/2; Left: 2/2 L2   Right: 2/2; Left: 2/2 L3   Right: 2/2; Left: 2/2 L4   Right: 2/2; Left: 2/2 L5   Right: 2/2; Left: 2/2 S1   Right: 2/2; Left: 2/2  Reflexes Patella   Right: 2+; Left 2+ Achilles   Right: 2+; Left 2+ Clonus  Right: absent; L: absent Tenderness to palpation throughout her cervical lumbar and thoracic spine

## 2024-02-07 NOTE — DISCUSSION/SUMMARY
[de-identified] : 68-year-old female presents to me with cervical thoracic and lumbar degeneration as well as pain that in the past has responded to injections.  She is interested in more injections.  I am given prescription physical therapy and having her follow-up with interventional pain management Dr. Vickers to discuss injections.  I have asked patient to bring in her MRI of her lumbar spine CD with her if she follows up with me.  Follow-up as needed.

## 2024-02-07 NOTE — DATA REVIEWED
[FreeTextEntry1] : MRI of the lumbar spine report attached to the chart reviewed with the patient.  AP and lateral scoliosis x-rays were reviewed with the patient.  There is some diffuse disc space degeneration fairly mild as well as facet arthritis.

## 2024-02-16 ENCOUNTER — APPOINTMENT (OUTPATIENT)
Dept: UROGYNECOLOGY | Facility: CLINIC | Age: 69
End: 2024-02-16
Payer: MEDICARE

## 2024-02-16 VITALS
DIASTOLIC BLOOD PRESSURE: 78 MMHG | HEIGHT: 62 IN | BODY MASS INDEX: 25.58 KG/M2 | SYSTOLIC BLOOD PRESSURE: 117 MMHG | WEIGHT: 139 LBS | HEART RATE: 75 BPM

## 2024-02-16 DIAGNOSIS — N39.41 URGE INCONTINENCE: ICD-10-CM

## 2024-02-16 DIAGNOSIS — Z87.898 PERSONAL HISTORY OF OTHER SPECIFIED CONDITIONS: ICD-10-CM

## 2024-02-16 DIAGNOSIS — N39.46 MIXED INCONTINENCE: ICD-10-CM

## 2024-02-16 DIAGNOSIS — S76.312A STRAIN OF MUSCLE, FASCIA AND TENDON OF THE POSTERIOR MUSCLE GROUP AT THIGH LEVEL, LEFT THIGH, INITIAL ENCOUNTER: ICD-10-CM

## 2024-02-16 DIAGNOSIS — S46.912A STRAIN OF UNSPECIFIED MUSCLE, FASCIA AND TENDON AT SHOULDER AND UPPER ARM LEVEL, LEFT ARM, INITIAL ENCOUNTER: ICD-10-CM

## 2024-02-16 DIAGNOSIS — M51.36 OTHER INTERVERTEBRAL DISC DEGENERATION, LUMBAR REGION: ICD-10-CM

## 2024-02-16 DIAGNOSIS — R13.13 DYSPHAGIA, PHARYNGEAL PHASE: ICD-10-CM

## 2024-02-16 DIAGNOSIS — M54.6 PAIN IN THORACIC SPINE: ICD-10-CM

## 2024-02-16 DIAGNOSIS — Z01.419 ENCOUNTER FOR GYNECOLOGICAL EXAMINATION (GENERAL) (ROUTINE) W/OUT ABNORMAL FINDINGS: ICD-10-CM

## 2024-02-16 DIAGNOSIS — Z87.39 PERSONAL HISTORY OF OTHER DISEASES OF THE MUSCULOSKELETAL SYSTEM AND CONNECTIVE TISSUE: ICD-10-CM

## 2024-02-16 LAB
BILIRUB UR QL STRIP: NORMAL
CLARITY UR: CLEAR
COLLECTION METHOD: NORMAL
GLUCOSE UR-MCNC: NORMAL
HCG UR QL: 0.2 EU/DL
HGB UR QL STRIP.AUTO: NORMAL
KETONES UR-MCNC: NORMAL
LEUKOCYTE ESTERASE UR QL STRIP: NORMAL
NITRITE UR QL STRIP: NORMAL
PH UR STRIP: 5.5
PROT UR STRIP-MCNC: NORMAL
SP GR UR STRIP: 1.02

## 2024-02-16 PROCEDURE — 52000 CYSTOURETHROSCOPY: CPT

## 2024-02-16 PROCEDURE — 81003 URINALYSIS AUTO W/O SCOPE: CPT | Mod: QW

## 2024-02-16 PROCEDURE — 99214 OFFICE O/P EST MOD 30 MIN: CPT | Mod: 25

## 2024-02-20 LAB — BACTERIA UR CULT: NORMAL

## 2024-03-13 ENCOUNTER — APPOINTMENT (OUTPATIENT)
Dept: PAIN MANAGEMENT | Facility: CLINIC | Age: 69
End: 2024-03-13
Payer: MEDICARE

## 2024-03-13 PROCEDURE — 99204 OFFICE O/P NEW MOD 45 MIN: CPT

## 2024-03-13 NOTE — PHYSICAL EXAM
[Normal Coordination] : normal coordination [Normal DTR UE/LE] : normal DTR UE/LE  [Normal Sensation] : normal sensation [Normal Mood and Affect] : normal mood and affect [Oriented] : oriented [Able to Communicate] : able to communicate [Well Developed] : well developed [Well Nourished] : well nourished [Flexion] : flexion [Extension] : extension [4___] : right extensor hallicus longus 4[unfilled]/5 [Absent] : achilles reflex absent [] : achilles and patella reflexes are not symmetrical [FreeTextEntry9] : Flexion>extension

## 2024-03-13 NOTE — PHYSICAL EXAM
[Normal Coordination] : normal coordination [Normal DTR UE/LE] : normal DTR UE/LE  [Normal Sensation] : normal sensation [Normal Mood and Affect] : normal mood and affect [Oriented] : oriented [Able to Communicate] : able to communicate [Well Developed] : well developed [Well Nourished] : well nourished [Flexion] : flexion [Extension] : extension [4___] : right extensor hallicus longus 4[unfilled]/5 [Absent] : achilles reflex absent [] : antalgic [FreeTextEntry9] : Flexion>extension

## 2024-03-13 NOTE — HISTORY OF PRESENT ILLNESS
[FreeTextEntry1] : This is a 68-year-old female here to establish care for mid-lower back pain. She is referred to me by Dr. Banks for further evaluation. The back pain is constant. It is occasionally associated with radicular pain into the left leg. Pain is not associated with numbness or tingling. She was previously seen by another pain management provider and underwent epidural injection therapy for her lower back which provided her with significant relief of her symptoms. She notes that sleeping has been difficult, and her quality of life has diminished due to her pain. At this time, her lower back pain is her chief complaint. The MRI of the lumbar spine was reviewed with the patient today and is documented below. She is interested in undergoing injection therapy since it was beneficial in the past.

## 2024-03-13 NOTE — DATA REVIEWED
[FreeTextEntry1] : MRI of the lumbar spine dated 7/8/2022 demonstrates transitional vertebra of the lumbosacral junction.  L3-4 diffuse disc herniation with hypertrophic change and ligamentum flavum hypertrophy with triangular-shaped to the thecal sac with central bilateral recess and neural foraminal narrowing when combined with neural foraminal narrowing.  L4-L5 circumferential disc bulge with thecal sac compression and bilateral neural foraminal narrowing.  L5-S1 circumferential disc bulge with thecal sac compression and bilateral neural foraminal narrowing.  X-ray of the lumbar spine dated 3/23/2018 demonstrates no acute osseous abnormalities.  Mild multilevel degenerative changes, stable since December 19, 2020 2017.  X-ray of the cervical spine dated 3/23/2018 demonstrates minimal dynamic instability at C2 on C3 and C3 on C4 as described above consistent with ligamentous instability.  Mild degenerative changes.

## 2024-03-13 NOTE — ASSESSMENT
[FreeTextEntry1] : This is a 68-year-old female with complaints of mid-lower back pain with occasional radicular pain into the left lower extremity. The pain is severe and makes it difficult for her to sleep. Pain is not associated with numbness or tingling.  She previously trialed epidural injections which provided her with significant relief of her symptoms.  Imaging studies as well as physical exam findings corroborate the symptomatology. We will proceed with a caudal DELMY x1 w/ mac and she will follow up afterwards. All this patients questions were answered and the conversation was understood well.  Patient had a MRI that shows a radicular component along with pain referred into the lower extremity. Patient has trialed rehab (Home exercise, physical therapy or chiropractic care) and medications I will schedule a Caudal 1-3 depending on effectiveness.  The patient has severe anxiety of procedures that necessitates monitored anesthesia care (MAC). The procedure performed will be close to major nerves, arteries, and spinal cord and/or joint structures. Due to the proximity of these structures, we need the patient to be still during the procedure. With the help of MAC, this will be safely achieved and decrease the risk of any complications.  RISK AND BENEFIT PARAGRAPH: Risk, benefits, pros and cons of procedure were explained to the patient using models and diagrams and their questions were answered.  I, Felicity Singh, attest that this documentation has been prepared under the direction and in the presence of Provider Clementina Vickers MD.   Thank you for allowing me to assist in the management of this patient.    Best Regards,    Clementina Vickers M.D., FAAPMR   Diplomate, American Board of Physical Medicine and Rehabilitation Diplomate, American Board of Pain Medicine

## 2024-03-29 ENCOUNTER — APPOINTMENT (OUTPATIENT)
Dept: PAIN MANAGEMENT | Facility: CLINIC | Age: 69
End: 2024-03-29
Payer: MEDICARE

## 2024-03-29 PROCEDURE — 93040 RHYTHM ECG WITH REPORT: CPT | Mod: 79

## 2024-03-29 PROCEDURE — 62323 NJX INTERLAMINAR LMBR/SAC: CPT

## 2024-03-29 PROCEDURE — 93770 DETERMINATION VENOUS PRESS: CPT | Mod: 59

## 2024-03-29 PROCEDURE — 00630 ANES PX LUMBAR REGION NOS: CPT | Mod: QZ,P2

## 2024-03-29 PROCEDURE — 94761 N-INVAS EAR/PLS OXIMETRY MLT: CPT | Mod: 59

## 2024-03-29 NOTE — PROCEDURE
[FreeTextEntry3] : CAUDAL EPIDURAL STEROID INJECTION    Date:  2024  Patient: Elizabeth Bryson  :  1955  Preoperative Diagnosis: Lumbar Radiculopathy  Postoperative Diagnosis: Lumbar Radiculopathy  Procedure: Caudal epidural injection under fluoroscopic guidance  Physician: Clementina Vickers MD, FAAPMR  Anesthesiologist/CRNA: Chasidy Regalado  Anesthesia: See Nurses notes. MAC. versed 2 mg IVP  Medical Necessity:  Failure of conservative management.   Indication for Fluoroscopy:  This procedure requires the precise placement of the spinal needle into the epidural space.  It is the only way to accurately and safely perform the injection.   CONSENT: The possible complications including infection, bleeding, nerve damage, hospital admission or failure of the procedure; though unusual, are theoretically possible. The patient was educated about the of the procedure and alternative therapies. All questions were answered and the patient freely gave consent to proceed.   Monitoring:  Patient had continuous blood pressure, EKG, and pulse oximetry throughout the case. See nurse's notes.     PROCEDURE NOTE:  After obtaining written consent, the patient was placed in the prone position with a pillow under the pelvis. Multiple fluoroscopic views of the sacrum-AP & Lateral- were obtained. The lower back and upper gluteal region were prepped with betadine and draped in the sterile fashion. A time out was performed.  The skin over the sacral hiatus was infiltrated with local anesthetic and a 18 gauge 3-1/2 inch Tuohy needle was inserted. The angle of the needle was lowered until it was felt to penetrate the sacrococcygeal ligament at which time the needle was advanced without resistance. Fluoroscopy confirmed the position of the needle within the caudal space. Omnipaque 240, 3 cc was injected to confirm an appropriate epidural spread. A total of 9ml of preservative-free sterile saline, 1 ml of depomedrol (80mg/cc) was injected via the needle into the caudal space.  The needle was cleared with three ml preservative-free normal saline.  There was no evidence of CSF or heme. The needle was removed intact. A band aid was place on the site.     Epiduragram Report: A spinal needle is in place in the caudal epidural space.  Central epidural spread of dye is noted from the lower sacral segments to L4-5.    Dye is seen outlining the sacral nerve roots bilaterally as they emerge from their respective foramen without obstruction pointing away from adhesion/fibrosis     Complications: None. The patient tolerated the procedure well.      Disposition: I have examined the patient and there are no new physical findings since the original presentation.  Sensory and motor function were intact. The patient met discharge criteria see nurses notes. The discharge instruction sheet was reviewed and given to the patient. The patient was discharged home with a . If patient gets sustained relief will have patient do modified planks 3 times a day on carpet or yoga mat starting at 5 seconds building up to 1 minute without grimacing/Valsalva and walking.     Comments: 1st caudal DELMY today, depending on effectiveness would schedule a 2nd caudal DELMY in 1-2 weeks or follow up in office depending on insurance. Call if any problems.      This document was electronically signed by:     Clementina Vickers MD, FAAPMR  Diplomate, American Board of Physical Medicine and Rehabilitation  Diplomate, American Board of Pain Medicine

## 2024-04-10 ENCOUNTER — APPOINTMENT (OUTPATIENT)
Dept: PAIN MANAGEMENT | Facility: CLINIC | Age: 69
End: 2024-04-10
Payer: MEDICARE

## 2024-04-10 DIAGNOSIS — M54.16 RADICULOPATHY, LUMBAR REGION: ICD-10-CM

## 2024-04-10 DIAGNOSIS — M54.50 LOW BACK PAIN, UNSPECIFIED: ICD-10-CM

## 2024-04-10 PROCEDURE — 99213 OFFICE O/P EST LOW 20 MIN: CPT

## 2024-04-10 NOTE — HISTORY OF PRESENT ILLNESS
[FreeTextEntry1] : ORIGINAL PRESENTATION: This is a 68-year-old female here to establish care for mid-lower back pain. She is referred to me by Dr. Banks for further evaluation. The back pain is constant. It is occasionally associated with radicular pain into the left leg. Pain is not associated with numbness or tingling. She was previously seen by another pain management provider and underwent epidural injection therapy for her lower back which provided her with significant relief of her symptoms. She notes that sleeping has been difficult, and her quality of life has diminished due to her pain. At this time, her lower back pain is her chief complaint. The MRI of the lumbar spine was reviewed with the patient today and is documented below. She is interested in undergoing injection therapy since it was beneficial in the past.   PATIENT PRESENTS FOR FOLLOW UP: She is under our care mid-lower back pain. The back pain is constant. It is occasionally associated with radicular pain into the left leg. Pain is not associated with numbness or tingling. She is s/p a caudal DELMY x1 w/ mac on 3/29/24 which provided her with 90% relief. There was overall improvement in functionality and ability to perform her ADLs. She reports that she feels like a "brand new person" and is incredibly satisfied with her relief.

## 2024-04-10 NOTE — REASON FOR VISIT
Discussed condition and exacerbating conditions/situations (e.g., dry/arid environments, overhead fans, air conditioners, side effect of medications). [Initial Consultation] : an initial pain management consultation [FreeTextEntry2] : POST INJECTION FOLLOW UP

## 2024-04-10 NOTE — ASSESSMENT
[FreeTextEntry1] : This is a 68-year-old female with complaints of mid-lower back pain with occasional radicular pain into the left lower extremity. The pain is severe and makes it difficult for her to sleep. Pain is not associated with numbness or tingling. She is s/p a caudal DELMY x1 w/ mac on 3/29/24 which provided her with 90% relief.  Patient is extremely satisfied with the state of her life and reports "she feels like a brand-new person".  We will hold off on repeating the injection at this time and she will follow-up on an as-needed basis. She is aware that she can contact the office should her pain return to baseline. All this patients questions were answered and the conversation was understood well.   I, Felicity Singh, attest that this documentation has been prepared under the direction and in the presence of Provider Clementina Vickers MD.   Thank you for allowing me to assist in the management of this patient.    Best Regards,    Clementina Vickers M.D., FAAPMR   Diplomate, American Board of Physical Medicine and Rehabilitation Diplomate, American Board of Pain Medicine

## 2024-05-10 ENCOUNTER — APPOINTMENT (OUTPATIENT)
Dept: ORTHOPEDIC SURGERY | Facility: CLINIC | Age: 69
End: 2024-05-10
Payer: MEDICARE

## 2024-05-10 DIAGNOSIS — M25.561 PAIN IN RIGHT KNEE: ICD-10-CM

## 2024-05-10 DIAGNOSIS — M25.562 PAIN IN LEFT KNEE: ICD-10-CM

## 2024-05-10 PROCEDURE — 99213 OFFICE O/P EST LOW 20 MIN: CPT | Mod: 25

## 2024-05-10 PROCEDURE — 20610 DRAIN/INJ JOINT/BURSA W/O US: CPT | Mod: 50

## 2024-05-10 NOTE — DISCUSSION/SUMMARY
[de-identified] : Discussed with patient detail that she has degenerative changes/osteoarthritis bilateral knees.  Discussed treatment options in detail including rest, ice/heat, range of motion exercise, physical therapy, anti-inflammatory medication, Tylenol, cortisone injection.  Patient is a prediabetic, controlled.  Plan is for bilateral knee cortisone injection.  Dexamethasone and Lidocaine         Anesthesia: ethyl chloride sprayed topically. Dexamethasone 20mg per 5mL 2cc        Lidocaine 1% 2cc                  Medication was injected in the B/L knee after verbal consent using sterile preparation and technique. The risks, benefits, and alternatives to cortisone injection were explained in full to the patient. Risks outlined include but are not limited to infection, sepsis, bleeding, scarring, skin discoloration, temporary increase in pain, syncopal episode, failure to resolve symptoms, allergic reaction, symptom recurrence, and elevation of blood sugar in diabetics. Patient understood the risks. All questions were answered. After discussion of options, patient requested an injection. Oral informed consent was obtained and sterile prep was done of the injection site. Sterile technique was utilized for the procedure including the preparation of the solutions used for the injection. Patient tolerated the procedure well. Advised to ice the injection site this evening.  Patient will follow-up in 4 months for reevaluation.  Call if any questions or concerns.  Patient understands agrees with plan.

## 2024-05-10 NOTE — HISTORY OF PRESENT ILLNESS
[de-identified] : Patient is a 68-year-old female here for follow-up evaluation of bilateral knee pain.  Patient states she has been experiencing bilateral knee pain for about 1 year with no recent injury or trauma.  Patient had cortisone injection about 4 months ago and states injection worked for about 3 months and pain started to come back.

## 2024-05-10 NOTE — PHYSICAL EXAM
[Bilateral] : knee bilaterally [5___] : hamstring 5[unfilled]/5 [Negative] : negative Perla's [] : mildly antalgic [TWNoteComboBox7] : flexion 125 degrees [de-identified] : extension 0 degrees

## 2024-09-20 ENCOUNTER — APPOINTMENT (OUTPATIENT)
Dept: ORTHOPEDIC SURGERY | Facility: CLINIC | Age: 69
End: 2024-09-20
Payer: MEDICARE

## 2024-09-20 DIAGNOSIS — M25.562 PAIN IN LEFT KNEE: ICD-10-CM

## 2024-09-20 DIAGNOSIS — M25.561 PAIN IN RIGHT KNEE: ICD-10-CM

## 2024-09-20 PROCEDURE — 99213 OFFICE O/P EST LOW 20 MIN: CPT | Mod: 25

## 2024-09-20 PROCEDURE — 20610 DRAIN/INJ JOINT/BURSA W/O US: CPT | Mod: 50

## 2024-09-20 NOTE — HISTORY OF PRESENT ILLNESS
[de-identified] : Patient is a 68-year-old female here for follow-up evaluation of bilateral knee pain.  Patient states she has been experiencing bilateral knee pain for about 1 year with no recent injury or trauma.  Patient had cortisone injection about 4 months ago and states injection worked for about 3 months and pain started to come back.  Patient interested in discussing viscosupplementation injections.

## 2024-09-20 NOTE — DISCUSSION/SUMMARY
[de-identified] : Discussed with patient detail that she has degenerative changes/osteoarthritis bilateral knees.  Discussed treatment options in detail including rest, ice/heat, range of motion exercise, physical therapy, anti-inflammatory medication, Tylenol, cortisone injection.  Patient is a prediabetic, controlled.  Plan is for bilateral knee cortisone injection.  Dexamethasone and Lidocaine         Anesthesia: ethyl chloride sprayed topically. Dexamethasone 20mg per 5mL 2cc        Lidocaine 1% 2cc                  Medication was injected in the B/L knee after verbal consent using sterile preparation and technique. The risks, benefits, and alternatives to cortisone injection were explained in full to the patient. Risks outlined include but are not limited to infection, sepsis, bleeding, scarring, skin discoloration, temporary increase in pain, syncopal episode, failure to resolve symptoms, allergic reaction, symptom recurrence, and elevation of blood sugar in diabetics. Patient understood the risks. All questions were answered. After discussion of options, patient requested an injection. Oral informed consent was obtained and sterile prep was done of the injection site. Sterile technique was utilized for the procedure including the preparation of the solutions used for the injection. Patient tolerated the procedure well. Advised to ice the injection site this evening.  Synvisc 3 injections ordered for bilateral knees, discussed in detail, patient will follow-up for injections.

## 2024-10-10 ENCOUNTER — APPOINTMENT (OUTPATIENT)
Dept: PAIN MANAGEMENT | Facility: CLINIC | Age: 69
End: 2024-10-10
Payer: MEDICARE

## 2024-10-10 DIAGNOSIS — M54.50 LOW BACK PAIN, UNSPECIFIED: ICD-10-CM

## 2024-10-10 PROCEDURE — 99214 OFFICE O/P EST MOD 30 MIN: CPT

## 2024-10-10 RX ORDER — MELOXICAM 15 MG/1
15 TABLET ORAL DAILY
Qty: 30 | Refills: 1 | Status: ACTIVE | COMMUNITY
Start: 2024-10-10 | End: 1900-01-01

## 2024-10-16 ENCOUNTER — OUTPATIENT (OUTPATIENT)
Dept: OUTPATIENT SERVICES | Facility: HOSPITAL | Age: 69
LOS: 1 days | End: 2024-10-16
Payer: MEDICARE

## 2024-10-16 ENCOUNTER — RESULT REVIEW (OUTPATIENT)
Age: 69
End: 2024-10-16

## 2024-10-16 DIAGNOSIS — Z12.31 ENCOUNTER FOR SCREENING MAMMOGRAM FOR MALIGNANT NEOPLASM OF BREAST: ICD-10-CM

## 2024-10-16 PROCEDURE — 77063 BREAST TOMOSYNTHESIS BI: CPT | Mod: 26

## 2024-10-16 PROCEDURE — 77063 BREAST TOMOSYNTHESIS BI: CPT

## 2024-10-16 PROCEDURE — 77067 SCR MAMMO BI INCL CAD: CPT | Mod: 26

## 2024-10-16 PROCEDURE — 77067 SCR MAMMO BI INCL CAD: CPT

## 2024-10-17 DIAGNOSIS — Z12.31 ENCOUNTER FOR SCREENING MAMMOGRAM FOR MALIGNANT NEOPLASM OF BREAST: ICD-10-CM

## 2024-10-18 ENCOUNTER — APPOINTMENT (OUTPATIENT)
Facility: CLINIC | Age: 69
End: 2024-10-18

## 2024-10-18 ENCOUNTER — APPOINTMENT (OUTPATIENT)
Dept: PAIN MANAGEMENT | Facility: CLINIC | Age: 69
End: 2024-10-18
Payer: MEDICARE

## 2024-10-18 DIAGNOSIS — M54.16 RADICULOPATHY, LUMBAR REGION: ICD-10-CM

## 2024-10-18 PROCEDURE — 94761 N-INVAS EAR/PLS OXIMETRY MLT: CPT

## 2024-10-18 PROCEDURE — 93040 RHYTHM ECG WITH REPORT: CPT | Mod: 79

## 2024-10-18 PROCEDURE — 93770 DETERMINATION VENOUS PRESS: CPT

## 2024-10-18 PROCEDURE — 62323 NJX INTERLAMINAR LMBR/SAC: CPT

## 2024-10-18 PROCEDURE — 00630 ANES PX LUMBAR REGION NOS: CPT | Mod: QZ,P2

## 2024-10-25 DIAGNOSIS — M79.604 PAIN IN RIGHT LEG: ICD-10-CM

## 2024-10-25 DIAGNOSIS — M79.605 PAIN IN RIGHT LEG: ICD-10-CM

## 2024-10-29 ENCOUNTER — APPOINTMENT (OUTPATIENT)
Dept: CARDIOLOGY | Facility: CLINIC | Age: 69
End: 2024-10-29
Payer: MEDICARE

## 2024-10-29 PROCEDURE — 93923 UPR/LXTR ART STDY 3+ LVLS: CPT

## 2024-10-29 PROCEDURE — 93925 LOWER EXTREMITY STUDY: CPT

## 2024-11-01 ENCOUNTER — APPOINTMENT (OUTPATIENT)
Dept: ORTHOPEDIC SURGERY | Facility: CLINIC | Age: 69
End: 2024-11-01
Payer: MEDICARE

## 2024-11-01 PROCEDURE — 20610 DRAIN/INJ JOINT/BURSA W/O US: CPT | Mod: 50

## 2024-11-01 PROCEDURE — 99213 OFFICE O/P EST LOW 20 MIN: CPT | Mod: 25

## 2024-11-06 ENCOUNTER — NON-APPOINTMENT (OUTPATIENT)
Age: 69
End: 2024-11-06

## 2024-11-06 ENCOUNTER — APPOINTMENT (OUTPATIENT)
Dept: CARDIOLOGY | Facility: CLINIC | Age: 69
End: 2024-11-06
Payer: MEDICARE

## 2024-11-06 VITALS
SYSTOLIC BLOOD PRESSURE: 110 MMHG | DIASTOLIC BLOOD PRESSURE: 82 MMHG | BODY MASS INDEX: 25.95 KG/M2 | HEIGHT: 62 IN | WEIGHT: 141 LBS

## 2024-11-06 DIAGNOSIS — E11.9 TYPE 2 DIABETES MELLITUS W/OUT COMPLICATIONS: ICD-10-CM

## 2024-11-06 DIAGNOSIS — I10 ESSENTIAL (PRIMARY) HYPERTENSION: ICD-10-CM

## 2024-11-06 DIAGNOSIS — Z87.891 PERSONAL HISTORY OF NICOTINE DEPENDENCE: ICD-10-CM

## 2024-11-06 PROCEDURE — 99204 OFFICE O/P NEW MOD 45 MIN: CPT

## 2024-11-06 RX ORDER — AMLODIPINE BESYLATE 5 MG/1
5 TABLET ORAL DAILY
Refills: 0 | Status: ACTIVE | COMMUNITY

## 2024-11-06 RX ORDER — SITAGLIPTIN AND METFORMIN HYDROCHLORIDE 50; 1000 MG/1; MG/1
50-1000 TABLET, FILM COATED ORAL TWICE DAILY
Refills: 0 | Status: ACTIVE | COMMUNITY

## 2024-11-08 ENCOUNTER — APPOINTMENT (OUTPATIENT)
Dept: ORTHOPEDIC SURGERY | Facility: CLINIC | Age: 69
End: 2024-11-08

## 2024-11-08 DIAGNOSIS — M25.561 PAIN IN RIGHT KNEE: ICD-10-CM

## 2024-11-08 DIAGNOSIS — M25.562 PAIN IN LEFT KNEE: ICD-10-CM

## 2024-11-08 PROCEDURE — 20610 DRAIN/INJ JOINT/BURSA W/O US: CPT | Mod: 50

## 2024-11-12 ENCOUNTER — APPOINTMENT (OUTPATIENT)
Dept: PAIN MANAGEMENT | Facility: CLINIC | Age: 69
End: 2024-11-12

## 2024-11-15 ENCOUNTER — APPOINTMENT (OUTPATIENT)
Dept: ORTHOPEDIC SURGERY | Facility: CLINIC | Age: 69
End: 2024-11-15
Payer: MEDICARE

## 2024-11-15 ENCOUNTER — APPOINTMENT (OUTPATIENT)
Dept: PAIN MANAGEMENT | Facility: CLINIC | Age: 69
End: 2024-11-15

## 2024-11-15 DIAGNOSIS — M17.0 BILATERAL PRIMARY OSTEOARTHRITIS OF KNEE: ICD-10-CM

## 2024-11-15 PROCEDURE — 20610 DRAIN/INJ JOINT/BURSA W/O US: CPT | Mod: 50

## 2024-11-19 ENCOUNTER — APPOINTMENT (OUTPATIENT)
Dept: PAIN MANAGEMENT | Facility: CLINIC | Age: 69
End: 2024-11-19
Payer: MEDICARE

## 2024-11-19 DIAGNOSIS — M54.50 LOW BACK PAIN, UNSPECIFIED: ICD-10-CM

## 2024-11-19 DIAGNOSIS — M54.16 RADICULOPATHY, LUMBAR REGION: ICD-10-CM

## 2024-11-19 PROCEDURE — 99213 OFFICE O/P EST LOW 20 MIN: CPT

## 2025-02-20 ENCOUNTER — OUTPATIENT (OUTPATIENT)
Dept: OUTPATIENT SERVICES | Facility: HOSPITAL | Age: 70
LOS: 1 days | End: 2025-02-20
Payer: MEDICARE

## 2025-02-20 ENCOUNTER — RESULT REVIEW (OUTPATIENT)
Age: 70
End: 2025-02-20

## 2025-02-20 DIAGNOSIS — Z00.8 ENCOUNTER FOR OTHER GENERAL EXAMINATION: ICD-10-CM

## 2025-02-20 PROCEDURE — 74177 CT ABD & PELVIS W/CONTRAST: CPT

## 2025-02-20 PROCEDURE — 74177 CT ABD & PELVIS W/CONTRAST: CPT | Mod: 26

## 2025-04-18 ENCOUNTER — APPOINTMENT (OUTPATIENT)
Dept: ORTHOPEDIC SURGERY | Facility: CLINIC | Age: 70
End: 2025-04-18
Payer: MEDICARE

## 2025-04-18 DIAGNOSIS — M17.0 BILATERAL PRIMARY OSTEOARTHRITIS OF KNEE: ICD-10-CM

## 2025-04-18 PROCEDURE — 99213 OFFICE O/P EST LOW 20 MIN: CPT

## 2025-04-18 RX ORDER — HYLAN G-F 20 16MG/2ML
16 SYRINGE (ML) INTRAARTICULAR
Qty: 3 | Refills: 0 | Status: ACTIVE | OUTPATIENT
Start: 2025-04-18

## 2025-04-24 ENCOUNTER — APPOINTMENT (OUTPATIENT)
Dept: PAIN MANAGEMENT | Facility: CLINIC | Age: 70
End: 2025-04-24
Payer: MEDICARE

## 2025-04-24 DIAGNOSIS — M54.50 LOW BACK PAIN, UNSPECIFIED: ICD-10-CM

## 2025-04-24 PROCEDURE — 99214 OFFICE O/P EST MOD 30 MIN: CPT

## 2025-04-24 RX ORDER — MELOXICAM 15 MG/1
15 TABLET ORAL DAILY
Qty: 30 | Refills: 1 | Status: ACTIVE | COMMUNITY
Start: 2025-04-24 | End: 1900-01-01

## 2025-04-24 RX ORDER — DIAZEPAM 5 MG/1
5 TABLET ORAL
Qty: 2 | Refills: 0 | Status: ACTIVE | COMMUNITY
Start: 2025-04-24 | End: 1900-01-01

## 2025-05-02 ENCOUNTER — APPOINTMENT (OUTPATIENT)
Dept: PAIN MANAGEMENT | Facility: CLINIC | Age: 70
End: 2025-05-02
Payer: MEDICARE

## 2025-05-02 DIAGNOSIS — M54.16 RADICULOPATHY, LUMBAR REGION: ICD-10-CM

## 2025-05-02 PROCEDURE — 93040 RHYTHM ECG WITH REPORT: CPT | Mod: 79

## 2025-05-02 PROCEDURE — 62323 NJX INTERLAMINAR LMBR/SAC: CPT

## 2025-05-02 PROCEDURE — 94761 N-INVAS EAR/PLS OXIMETRY MLT: CPT

## 2025-05-02 PROCEDURE — 93770 DETERMINATION VENOUS PRESS: CPT

## 2025-05-07 ENCOUNTER — APPOINTMENT (OUTPATIENT)
Dept: CARDIOLOGY | Facility: CLINIC | Age: 70
End: 2025-05-07

## 2025-05-13 ENCOUNTER — APPOINTMENT (OUTPATIENT)
Dept: PAIN MANAGEMENT | Facility: CLINIC | Age: 70
End: 2025-05-13

## 2025-05-16 ENCOUNTER — APPOINTMENT (OUTPATIENT)
Dept: CARDIOLOGY | Facility: CLINIC | Age: 70
End: 2025-05-16

## 2025-05-23 ENCOUNTER — APPOINTMENT (OUTPATIENT)
Dept: ORTHOPEDIC SURGERY | Facility: CLINIC | Age: 70
End: 2025-05-23
Payer: MEDICARE

## 2025-05-23 PROCEDURE — 20610 DRAIN/INJ JOINT/BURSA W/O US: CPT | Mod: 50

## 2025-05-30 ENCOUNTER — APPOINTMENT (OUTPATIENT)
Dept: ORTHOPEDIC SURGERY | Facility: CLINIC | Age: 70
End: 2025-05-30
Payer: MEDICARE

## 2025-05-30 DIAGNOSIS — M17.0 BILATERAL PRIMARY OSTEOARTHRITIS OF KNEE: ICD-10-CM

## 2025-05-30 PROCEDURE — 20610 DRAIN/INJ JOINT/BURSA W/O US: CPT | Mod: 50

## 2025-06-13 ENCOUNTER — APPOINTMENT (OUTPATIENT)
Dept: ORTHOPEDIC SURGERY | Facility: CLINIC | Age: 70
End: 2025-06-13

## 2025-08-05 ENCOUNTER — NON-APPOINTMENT (OUTPATIENT)
Age: 70
End: 2025-08-05